# Patient Record
Sex: FEMALE | Race: WHITE | Employment: OTHER | URBAN - METROPOLITAN AREA
[De-identification: names, ages, dates, MRNs, and addresses within clinical notes are randomized per-mention and may not be internally consistent; named-entity substitution may affect disease eponyms.]

---

## 2017-01-06 DIAGNOSIS — M54.5 CHRONIC LOW BACK PAIN, UNSPECIFIED BACK PAIN LATERALITY, WITH SCIATICA PRESENCE UNSPECIFIED: Primary | ICD-10-CM

## 2017-01-06 DIAGNOSIS — G89.29 CHRONIC LOW BACK PAIN, UNSPECIFIED BACK PAIN LATERALITY, WITH SCIATICA PRESENCE UNSPECIFIED: Primary | ICD-10-CM

## 2017-01-06 NOTE — TELEPHONE ENCOUNTER
Meloxicam 15mg       Last Written Prescription Date: 12/5/16  Last Quantity: 30, # refills: 0  Last Office Visit with G, P or [facility] prescribing provider: 12/7/16       CREATININE   Date Value Ref Range Status   02/24/2016 0.97 0.52 - 1.04 mg/dL Final     AST       18   10/4/2016  ALT       34   10/4/2016  BP Readings from Last 3 Encounters:   12/28/16 118/75   12/07/16 114/80   10/10/16 115/75     Thank you!  Rachael Arzola   [facility]  P: [phone] F: [phone]

## 2017-01-06 NOTE — TELEPHONE ENCOUNTER
Routing refill request to provider for review/approval because:  Med plan.    Emelia Green, Pharm.D.  [facility]  [phone]

## 2017-01-09 RX ORDER — MELOXICAM 15 MG/1
15 TABLET ORAL DAILY
Qty: 90 TABLET | Refills: 1 | Status: SHIPPED | OUTPATIENT
Start: 2017-01-09 | End: 2017-06-30

## 2017-01-11 ENCOUNTER — MYC MEDICAL ADVICE (OUTPATIENT)
Dept: NEUROLOGY | Facility: CLINIC | Age: 57
End: 2017-01-11

## 2017-01-11 ENCOUNTER — MYC MEDICAL ADVICE (OUTPATIENT)
Dept: FAMILY MEDICINE | Facility: CLINIC | Age: 57
End: 2017-01-11

## 2017-01-20 DIAGNOSIS — F43.23 ADJUSTMENT DISORDER WITH MIXED ANXIETY AND DEPRESSED MOOD: Primary | ICD-10-CM

## 2017-01-20 RX ORDER — CLONAZEPAM 1 MG/1
1-2 TABLET ORAL
Qty: 45 TABLET | Refills: 0 | Status: SHIPPED | OUTPATIENT
Start: 2017-01-20 | End: 2017-02-21

## 2017-01-20 NOTE — TELEPHONE ENCOUNTER
Neurologist Dr Alaniz had offered to take over prescribing since this was no longer for anxiety.  Re-routing.

## 2017-01-20 NOTE — TELEPHONE ENCOUNTER
Klonopin 1mg      Last Written Prescription Date:  11/9/16  Last Fill Quantity: 45,   # refills: 1  Last Office Visit with [facility], [facility] or [facility] prescribing provider: 12/7/16  Future Office visit:       Routing refill request to provider for review/approval because:  Drug not on the [facility], [facility] or [facility] refill protocol or controlled substance    Ceasar Henderson CPhT  [facility]    On behalf of [facility]

## 2017-01-30 ENCOUNTER — RADIANT APPOINTMENT (OUTPATIENT)
Dept: GENERAL RADIOLOGY | Facility: CLINIC | Age: 57
End: 2017-01-30
Attending: PHYSICIAN ASSISTANT
Payer: COMMERCIAL

## 2017-01-30 ENCOUNTER — OFFICE VISIT (OUTPATIENT)
Dept: FAMILY MEDICINE | Facility: CLINIC | Age: 57
End: 2017-01-30
Payer: COMMERCIAL

## 2017-01-30 VITALS
BODY MASS INDEX: 32.43 KG/M2 | SYSTOLIC BLOOD PRESSURE: 123 MMHG | HEART RATE: 81 BPM | WEIGHT: 183 LBS | HEIGHT: 63 IN | TEMPERATURE: 98.2 F | DIASTOLIC BLOOD PRESSURE: 75 MMHG

## 2017-01-30 DIAGNOSIS — S99.912A LEFT ANKLE INJURY, INITIAL ENCOUNTER: ICD-10-CM

## 2017-01-30 DIAGNOSIS — S99.912A LEFT ANKLE INJURY, INITIAL ENCOUNTER: Primary | ICD-10-CM

## 2017-01-30 PROCEDURE — 99213 OFFICE O/P EST LOW 20 MIN: CPT | Performed by: PHYSICIAN ASSISTANT

## 2017-01-30 PROCEDURE — 73610 X-RAY EXAM OF ANKLE: CPT | Mod: LT

## 2017-01-30 NOTE — NURSING NOTE
"Chief Complaint   Patient presents with     Musculoskeletal Problem       Initial /75 mmHg  Pulse 81  Temp(Src) 98.2  F (36.8  C) (Tympanic)  Ht 5' 3\" (1.6 m)  Wt 183 lb (83.008 kg)  BMI 32.43 kg/m2  LMP 02/24/1993 Estimated body mass index is 32.43 kg/(m^2) as calculated from the following:    Height as of this encounter: 5' 3\" (1.6 m).    Weight as of this encounter: 183 lb (83.008 kg).  BP completed using cuff size: elan REID MA    "

## 2017-01-30 NOTE — PROGRESS NOTES
"  SUBJECTIVE:                                                    Patricia Smith is a 57 year old female who presents to clinic today for the following health issues:    Musculoskeletal problem/pain    Duration: 12/7/2016    Description  Location: Left ankle    Intensity:  moderate    Accompanying signs and symptoms: swelling and Pain, discoloration.  Feels it swells on top of the foot, after awhile it turns black/blue.  Toes often feel cold    History  Previous similar problem: no   Previous evaluation:  none    Precipitating or alleviating factors:  Trauma or overuse: YES- fall from step ladder  Aggravating factors include: standing and walking    Therapies tried and outcome: support wrap   Was standing sideways on a ladder and other foot slid down so that her affected L leg was stuck up on ladder.  Immediate pain and swelling.  Pain continues and swelling still occurs - near L malleolus and over tarsal area.  Continues to bruise as well.      Problem list and histories reviewed & adjusted, as indicated.  Additional history: as documented    Problem list, Medication list, Allergies, and Medical/Social/Surgical histories reviewed in EPIC and updated as appropriate.    ROS:  Constitutional, musculoskeletal systems are negative, except as otherwise noted.    OBJECTIVE:                                                    /75 mmHg  Pulse 81  Temp(Src) 98.2  F (36.8  C) (Tympanic)  Ht 5' 3\" (1.6 m)  Wt 183 lb (83.008 kg)  BMI 32.43 kg/m2  LMP 02/24/1993  Body mass index is 32.43 kg/(m^2).  GENERAL: healthy, alert and no distress  MS: Ankle: L ankle has mild swelling around lateral malleolus and around ATF.  It is most tender over 3rd and 4th tarsals.  Remainder of foot is not tender to palpation.  There is no discoloration.  Normal warmth.  Toes mildly swollen.  Anterior and posterior draws with normal stability and without pain.  Has a lot of pain with twisting midfoot.      Xray read by Tresa Best, " RANDELL as no fracture or lizfranc     ASSESSMENT/PLAN:                                                        ICD-10-CM    1. Left ankle injury, initial encounter S99.912A XR Ankle Left G/E 3 Views     PODIATRY/FOOT & ANKLE SURGERY REFERRAL     Patient Instructions   No break seen  Since this has been for 6+ weeks, still see foot doctor      Tresa Best PA-C  [facility]

## 2017-01-30 NOTE — MR AVS SNAPSHOT
After Visit Summary   1/30/2017    Patricia Smith    MRN: [id number]           Patient Information     Date Of Birth          1960        Visit Information        Provider Department      1/30/2017 3:20 PM Tresa Best PA-C [facility]        Today's Diagnoses     Left ankle injury, initial encounter    -  1       Care Instructions    No break seen  Since this has been for 6+ weeks, still see foot doctor        Follow-ups after your visit        Additional Services     PODIATRY/FOOT & ANKLE SURGERY REFERRAL       Your provider has referred you to: [facility]: [facility] [phone]   [email or web address]    Please be aware that coverage of these services is subject to the terms and limitations of your health insurance plan.  Call member services at your health plan with any benefit or coverage questions.      Please bring the following to your appointment:  >>   Any x-rays, CTs or MRIs which have been performed.  Contact the facility where they were done to arrange for  prior to your scheduled appointment.    >>   List of current medications   >>   This referral request   >>   Any documents/labs given to you for this referral                  Your next 10 appointments already scheduled     Jun 28, 2017 10:15 AM   [facility]iz [facility]   [facility]    [address] [address] [phone]              Who to contact     If you have questions or need follow up information about today's clinic visit or your schedule please contact [facility] directly at [phone].  Normal or non-critical lab and imaging results will be communicated to you by MyChart, letter or phone within 4 business days after the clinic has received the results. If you do not hear from us within 7 days, please contact the clinic through [facility] or  "phone. If you have a critical or abnormal lab result, we will notify you by phone as soon as possible.  Submit refill requests through [employer] or call your pharmacy and they will forward the refill request to us. Please allow 3 business days for your refill to be completed.          Additional Information About Your Visit        [employer] System[employer] Information     [employer] gives you secure access to your electronic health record. If you see a primary care provider, you can also send messages to your care team and make appointments. If you have questions, please call your primary care clinic.  If you do not have a primary care provider, please call [phone] and they will assist you.        Care EveryWhere ID     This is your Care EveryWhere ID. This could be used by other organizations to access your [facility] medical records  [id number]        Your Vitals Were     Pulse Temperature Height BMI (Body Mass Index) Last Period       81 98.2  F (36.8  C) (Tympanic) 5' 3\" (1.6 m) 32.43 kg/m2 02/24/1993        Blood Pressure from Last 3 Encounters:   01/30/17 123/75   12/28/16 118/75   12/07/16 114/80    Weight from Last 3 Encounters:   01/30/17 183 lb (83.008 kg)   12/28/16 185 lb 9.6 oz (84.188 kg)   10/10/16 181 lb 9.6 oz (82.373 kg)              We Performed the Following     PODIATRY/FOOT & ANKLE SURGERY REFERRAL          Today's Medication Changes          These changes are accurate as of: 1/30/17  3:58 PM.  If you have any questions, ask your nurse or doctor.               These medicines have changed or have updated prescriptions.        Dose/Directions    fluticasone-salmeterol 45-21 MCG/ACT inhaler   Commonly known as:  ADVAIR-HFA   This may have changed:    - when to take this  - reasons to take this   Used for:  Mild intermittent asthma without complication        Dose:  2 puff   Inhale 2 puffs into the lungs 2 times daily   Quantity:  12 g   Refills:  1                Primary Care Provider Office Phone # Fax #    " Tresa Best PA-C [phone]       [facility] [address] [address]        Thank you!     Thank you for choosing [facility]  for your care. Our goal is always to provide you with excellent care. Hearing back from our patients is one way we can continue to improve our services. Please take a few minutes to complete the written survey that you may receive in the mail after your visit with us. Thank you!             Your Updated Medication List - Protect others around you: Learn how to safely use, store and throw away your medicines at www.disposemymeds.org.          This list is accurate as of: 1/30/17  3:58 PM.  Always use your most recent med list.                   Brand Name Dispense Instructions for use    albuterol 108 (90 BASE) MCG/ACT Inhaler    PROAIR HFA/PROVENTIL HFA/VENTOLIN HFA    1 Inhaler    Inhale 2 puffs into the lungs every 6 hours       baclofen 20 MG tablet    LIORESAL    60 tablet    Take 1 tablet (20 mg) by mouth 2 times daily       BOTOX IJ          clonazePAM 1 MG tablet    klonoPIN    45 tablet    Take 1-2 tablets (1-2 mg) by mouth nightly as needed for anxiety Trying to reduce amount of clonazepam and see if we can get you off it.  Currently taking 1.5 mg per day.  Refill not more than monthly.       cyanocobalamin 1000 MCG tablet    vitamin  B-12     Take 1,000 mcg by mouth daily       esomeprazole 40 MG CR capsule    nexIUM    90 capsule    Take 1 capsule (40 mg) by mouth every morning (before breakfast) Take 30-60 minutes before eating.       fexofenadine-pseudoePHEDrine  MG per 12 hr tablet    ALLEGRA-D    60 tablet    Take 1 tablet by mouth 2 times daily       fluticasone-salmeterol 45-21 MCG/ACT inhaler    ADVAIR-HFA    12 g    Inhale 2 puffs into the lungs 2 times daily       furosemide 40 MG tablet    LASIX    90 tablet    Take 1 tablet (40 mg) by mouth daily       gabapentin 300 MG capsule    NEURONTIN    180  capsule    3 caps morning and 3 caps evening.       meloxicam 15 MG tablet    MOBIC    90 tablet    Take 1 tablet (15 mg) by mouth daily       MULTI FOR HER 50+ Caps          order for DME     1 Device    Equipment being ordered: ankle, air, kingp, universal       sertraline 100 MG tablet    ZOLOFT    45 tablet    Take 1.5 tablets (150 mg) by mouth daily       Vitamin D-3 1000 UNITS Caps      Take 5,000 Units by mouth daily

## 2017-02-01 ENCOUNTER — OFFICE VISIT (OUTPATIENT)
Dept: PODIATRY | Facility: CLINIC | Age: 57
End: 2017-02-01
Payer: MEDICARE

## 2017-02-01 VITALS — WEIGHT: 183 LBS | HEIGHT: 63 IN | BODY MASS INDEX: 32.43 KG/M2

## 2017-02-01 DIAGNOSIS — S93.402D INVERSION SPRAIN OF LEFT ANKLE, SUBSEQUENT ENCOUNTER: ICD-10-CM

## 2017-02-01 DIAGNOSIS — M25.572 ACUTE LEFT ANKLE PAIN: Primary | ICD-10-CM

## 2017-02-01 PROCEDURE — 99203 OFFICE O/P NEW LOW 30 MIN: CPT | Performed by: PODIATRIST

## 2017-02-04 ENCOUNTER — MYC MEDICAL ADVICE (OUTPATIENT)
Dept: FAMILY MEDICINE | Facility: CLINIC | Age: 57
End: 2017-02-04

## 2017-02-09 NOTE — PROGRESS NOTES
PATIENT HISTORY:  Patricia Smith is a 57 year old female who presents to clinic for a painful left ankle.  The patient describes the pain as sharp stabbing.  The patient relates pain is located over the outside of the left ankle.  The patient relates the pain has been present for the past several weeks.  The patient relates pain with ambulation.  The patient has tried ice with little relief.       REVIEW OF SYSTEMS:  Constitutional, HEENT, cardiovascular, pulmonary, GI, , musculoskeletal, neuro, skin, endocrine and psych systems are negative, except as otherwise noted.     PAST MEDICAL HISTORY:   Past Medical History   Diagnosis Date     Depression      Asthma      GERD (gastroesophageal reflux disease)      Anxiety      DJD (degenerative joint disease)      Tinea corporis      Constipation      Allergic rhinitis      Degeneration of cervical intervertebral disc      Hypothyroidism      Degeneration of lumbosacral intervertebral disc      Degenerative disc disease      Hemorrhoids      Pelvic fracture (H)      Depressive disorder      History of blood transfusion      multiple for menorrhagia, last transfusion .  pt reports has had HIV and hep B & C testing        PAST SURGICAL HISTORY:   Past Surgical History   Procedure Laterality Date     Appendectomy       Ent surgery       Back surgery  , , 2013     Laminect/discectomy, lumbar  , 2012     Laminectomy/Discectomy Cervical     C/section, classical  x3     , Classical     Tonsillectomy & adenoidectomy  1963     Neck surgery  x3     Hysterectomy, korina       Surgical history of -        bowel obstruction w/adhesions     Surgical history of -  Left      bone spur shoulder     Hemorrhoidectomy  2015     Orthopedic surgery       Abdomen surgery       See attached Sheet     Head & neck surgery  x3        MEDICATIONS:   Current outpatient prescriptions:      clonazePAM (KLONOPIN) 1 MG tablet, Take 1-2 tablets (1-2 mg) by mouth  nightly as needed for anxiety Trying to reduce amount of clonazepam and see if we can get you off it.  Currently taking 1.5 mg per day.  Refill not more than monthly., Disp: 45 tablet, Rfl: 0     meloxicam (MOBIC) 15 MG tablet, Take 1 tablet (15 mg) by mouth daily, Disp: 90 tablet, Rfl: 1     baclofen (LIORESAL) 20 MG tablet, Take 1 tablet (20 mg) by mouth 2 times daily, Disp: 60 tablet, Rfl: 1     order for DME, Equipment being ordered: ankle, air, stirrup, universal, Disp: 1 Device, Rfl: 0     gabapentin (NEURONTIN) 300 MG capsule, 3 caps morning and 3 caps evening., Disp: 180 capsule, Rfl: 5     fexofenadine-pseudoePHEDrine (ALLEGRA-D)  MG per tablet, Take 1 tablet by mouth 2 times daily, Disp: 60 tablet, Rfl: 11     sertraline (ZOLOFT) 100 MG tablet, Take 1.5 tablets (150 mg) by mouth daily, Disp: 45 tablet, Rfl: 5     fluticasone-salmeterol (ADVAIR-HFA) 45-21 MCG/ACT inhaler, Inhale 2 puffs into the lungs 2 times daily (Patient taking differently: Inhale 2 puffs into the lungs 2 times daily as needed ), Disp: 12 g, Rfl: 1     albuterol (PROAIR HFA, PROVENTIL HFA, VENTOLIN HFA) 108 (90 BASE) MCG/ACT inhaler, Inhale 2 puffs into the lungs every 6 hours, Disp: 1 Inhaler, Rfl: 3     Multiple Vitamins-Minerals (MULTI FOR HER 50+) CAPS, , Disp: , Rfl:      esomeprazole (NEXIUM) 40 MG capsule, Take 1 capsule (40 mg) by mouth every morning (before breakfast) Take 30-60 minutes before eating., Disp: 90 capsule, Rfl: 3     furosemide (LASIX) 40 MG tablet, Take 1 tablet (40 mg) by mouth daily, Disp: 90 tablet, Rfl: 3     Cholecalciferol (VITAMIN D-3) 1000 UNITS CAPS, Take 5,000 Units by mouth daily , Disp: , Rfl:      cyanocobalamin (VITAMIN  B-12) 1000 MCG tablet, Take 1,000 mcg by mouth daily, Disp: , Rfl:      OnabotulinumtoxinA (BOTOX IJ), , Disp: , Rfl:      ALLERGIES:    Allergies   Allergen Reactions     Bactrim [Sulfamethoxazole W/Trimethoprim] Hives     Codeine Hives     Erythromycin GI Disturbance      "Hydrocodone Hives     Lortab [Hydrocodone-Acetaminophen] Hives     Omeprazole      Dizzy       Penicillins Hives        SOCIAL HISTORY:   Social History     Social History     Marital Status:      Spouse Name: N/A     Number of Children: N/A     Years of Education: N/A     Occupational History     Not on file.     Social History Main Topics     Smoking status: Former Smoker -- 1.00 packs/day for 15 years     Types: Cigarettes     Smokeless tobacco: Never Used      Comment: started at age 19.  Smoked about 15 years     Alcohol Use: No     Drug Use: No     Sexual Activity:     Partners: Male     Birth Control/ Protection: Abstinence, Female Surgical     Other Topics Concern     Parent/Sibling W/ Cabg, Mi Or Angioplasty Before 65f 55m? Yes     I think my Brother Nicholas Kwan     Social History Narrative        FAMILY HISTORY:   Family History   Problem Relation Age of Onset     Coronary Artery Disease Brother      in hs 50s     Breast Cancer No family hx of      Colon Cancer No family hx of      DIABETES Mother      Coronary Artery Disease Brother      Hypertension Father      Hyperlipidemia Father      Other Cancer Sister      Ovarian     Depression Mother      Depression Sister      Anxiety Disorder Mother      Anxiety Disorder Nephew      Anxiety Disorder Nephew      Asthma Mother      Asthma Sister      Thyroid Disease Sister      Coronary Artery Disease Brother      Other Cancer Sister      Ovarian        EXAM:Vitals: Ht 1.6 m (5' 3\")  Wt 83.008 kg (183 lb)  BMI 32.43 kg/m2  LMP 02/24/1993  BMI= Body mass index is 32.43 kg/(m^2).    Weight management plan: Patient was referred to their PCP to discuss a diet and exercise plan.    General appearance: Patient is alert and fully cooperative with history & exam.  No sign of distress is noted during the visit.     Psychiatric: Affect is pleasant & appropriate.  Patient appears motivated to improve health.     Respiratory: Breathing is regular & unlabored while " sitting.     HEENT: Hearing is intact to spoken word.  Speech is clear.  No gross evidence of visual impairment that would impact ambulation.     Dermatologic: Skin is intact to both lower extremities without significant lesions, rash or abrasion.  No paronychia or evidence of soft tissue infection is noted.     Vascular: DP & PT pulses are intact & regular bilaterally.  No significant edema or varicosities noted.  CFT and skin temperature is normal to both lower extremities.     Neurologic: Lower extremity sensation is intact to light touch.  No evidence of weakness or contracture in the lower extremities.  No evidence of neuropathy.     Musculoskeletal: Patient is ambulatory without assistive device or brace.  No gross ankle deformity noted.  No foot or ankle joint effusion is noted.  Noted pain on palpation over the Anterior Talofibular ligament on the left.  Noted edema over the sinus tarsi on the left.  Pain with inversion of the ankle joint on the left.  No ecchymosis noted.    Radiographs were evaluated including AP, lateral and mortise views of the left ankle reveals  no cortical erosions or periosteal elevation.  All joint margins appear stable.  There is no apparent fracture or tumor formation noted.  There is no evidence of foreign body.    ASSESSMENT / PLAN:     ICD-10-CM    1. Acute left ankle pain M25.572    2. Inversion sprain of left ankle, subsequent encounter S93.402D        I have explained to Patricia  about the conditions.  We discussed the nature of the condition as well as the treatment plan and expected length of recovery.  At this time, the patient was instructed on icing, stretching, tissue massage and support.  The patient will return in four weeks for reevaluation if the symptoms do not resolve.          Disclaimer: This note consists of symbols derived from keyboarding, dictation and/or voice recognition software. As a result, there may be errors in the script that have gone undetected.  Please consider this when interpreting information found in this chart.       MILLER An D.P.M., FLORRIE.F.A.S.      '

## 2017-02-13 ENCOUNTER — MYC REFILL (OUTPATIENT)
Dept: FAMILY MEDICINE | Facility: CLINIC | Age: 57
End: 2017-02-13

## 2017-02-13 DIAGNOSIS — G89.29 CHRONIC LOW BACK PAIN, UNSPECIFIED BACK PAIN LATERALITY, WITH SCIATICA PRESENCE UNSPECIFIED: ICD-10-CM

## 2017-02-13 DIAGNOSIS — M54.5 CHRONIC LOW BACK PAIN, UNSPECIFIED BACK PAIN LATERALITY, WITH SCIATICA PRESENCE UNSPECIFIED: ICD-10-CM

## 2017-02-14 NOTE — TELEPHONE ENCOUNTER
Routing refill request to provider for review/approval because:  Drug not on the [facility] refill protocol     Thank you  Janiya TRUONG RN

## 2017-02-16 RX ORDER — BACLOFEN 20 MG/1
20 TABLET ORAL 2 TIMES DAILY
Qty: 60 TABLET | Refills: 3 | Status: SHIPPED | OUTPATIENT
Start: 2017-02-16 | End: 2017-03-13

## 2017-02-20 ENCOUNTER — MYC REFILL (OUTPATIENT)
Dept: NEUROLOGY | Facility: CLINIC | Age: 57
End: 2017-02-20

## 2017-02-20 DIAGNOSIS — F43.23 ADJUSTMENT DISORDER WITH MIXED ANXIETY AND DEPRESSED MOOD: ICD-10-CM

## 2017-02-20 RX ORDER — CLONAZEPAM 1 MG/1
1-2 TABLET ORAL
Qty: 45 TABLET | Refills: 0 | Status: CANCELLED | OUTPATIENT
Start: 2017-02-20

## 2017-02-21 DIAGNOSIS — F43.23 ADJUSTMENT DISORDER WITH MIXED ANXIETY AND DEPRESSED MOOD: ICD-10-CM

## 2017-02-21 NOTE — TELEPHONE ENCOUNTER
Clonazepam 1mg      Last Written Prescription Date:  1/20/17  Last Fill Quantity: 45,   # refills: 0  Last Office Visit with G, [facility] or [facility] prescribing provider: 1/30/17  Future Office visit:       Routing refill request to provider for review/approval because:  Drug not on the [facility], [facility] or [facility] refill protocol or controlled substance      Thank You!  Lizette Olivares  [facility]  P: [phone] F:[phone]

## 2017-02-22 RX ORDER — CLONAZEPAM 1 MG/1
1-2 TABLET ORAL
Qty: 45 TABLET | Refills: 0 | Status: SHIPPED | OUTPATIENT
Start: 2017-02-22 | End: 2017-03-13

## 2017-02-22 NOTE — TELEPHONE ENCOUNTER
I would like Patricia to follow-up with me regarding continuation of the medication. I would like to have her wean off of the benzodiazepine and try an alternative. We should discuss this in clinic.  CY

## 2017-02-22 NOTE — TELEPHONE ENCOUNTER
Pt notified via mychart to wean off the medication and discuss at follow up appt.  Isabel HALL RN BSN PHN  [facility]

## 2017-03-02 ENCOUNTER — MYC MEDICAL ADVICE (OUTPATIENT)
Dept: FAMILY MEDICINE | Facility: CLINIC | Age: 57
End: 2017-03-02

## 2017-03-02 DIAGNOSIS — K21.9 GASTROESOPHAGEAL REFLUX DISEASE WITHOUT ESOPHAGITIS: ICD-10-CM

## 2017-03-02 RX ORDER — ESOMEPRAZOLE MAGNESIUM 40 MG/1
40 CAPSULE, DELAYED RELEASE ORAL
Qty: 90 CAPSULE | Refills: 1 | Status: SHIPPED | OUTPATIENT
Start: 2017-03-02 | End: 2017-03-13

## 2017-03-02 NOTE — TELEPHONE ENCOUNTER
Nexium      Last Written Prescription Date: 2-24-16  Last Fill Quantity: 90,  # refills: 3   Last Office Visit with G, P or [facility] prescribing provider: 1-30-17      [facility]  [facility]  On Behalf of [facility]

## 2017-03-02 NOTE — TELEPHONE ENCOUNTER
Prescription approved per [facility] Refill Protocol.    Emelia Green, Pharm.D.  [facility]  [phone]

## 2017-03-13 ENCOUNTER — OFFICE VISIT (OUTPATIENT)
Dept: NEUROLOGY | Facility: CLINIC | Age: 57
End: 2017-03-13
Payer: MEDICARE

## 2017-03-13 VITALS — DIASTOLIC BLOOD PRESSURE: 59 MMHG | SYSTOLIC BLOOD PRESSURE: 103 MMHG | TEMPERATURE: 98 F | HEART RATE: 83 BPM

## 2017-03-13 DIAGNOSIS — G89.29 CHRONIC LOW BACK PAIN, UNSPECIFIED BACK PAIN LATERALITY, WITH SCIATICA PRESENCE UNSPECIFIED: ICD-10-CM

## 2017-03-13 DIAGNOSIS — M54.2 CERVICALGIA: ICD-10-CM

## 2017-03-13 DIAGNOSIS — M54.5 CHRONIC LOW BACK PAIN, UNSPECIFIED BACK PAIN LATERALITY, WITH SCIATICA PRESENCE UNSPECIFIED: ICD-10-CM

## 2017-03-13 DIAGNOSIS — R25.3 MUSCLE TWITCHING: ICD-10-CM

## 2017-03-13 DIAGNOSIS — Z98.890 HISTORY OF SPINAL SURGERY: ICD-10-CM

## 2017-03-13 DIAGNOSIS — R20.2 PARESTHESIAS: Primary | ICD-10-CM

## 2017-03-13 PROCEDURE — 99215 OFFICE O/P EST HI 40 MIN: CPT | Performed by: PSYCHIATRY & NEUROLOGY

## 2017-03-13 RX ORDER — BACLOFEN 20 MG/1
20 TABLET ORAL 2 TIMES DAILY
Qty: 60 TABLET | Refills: 3 | Status: SHIPPED | OUTPATIENT
Start: 2017-03-13 | End: 2017-06-28

## 2017-03-13 RX ORDER — CLONAZEPAM 1 MG/1
1-2 TABLET ORAL
Qty: 45 TABLET | Refills: 1 | Status: SHIPPED | OUTPATIENT
Start: 2017-03-13 | End: 2017-05-20

## 2017-03-13 RX ORDER — GABAPENTIN 300 MG/1
CAPSULE ORAL
Qty: 180 CAPSULE | Refills: 5 | Status: SHIPPED | OUTPATIENT
Start: 2017-03-13 | End: 2017-09-14

## 2017-03-13 ASSESSMENT — ANXIETY QUESTIONNAIRES
GAD7 TOTAL SCORE: 7
3. WORRYING TOO MUCH ABOUT DIFFERENT THINGS: SEVERAL DAYS
5. BEING SO RESTLESS THAT IT IS HARD TO SIT STILL: SEVERAL DAYS
2. NOT BEING ABLE TO STOP OR CONTROL WORRYING: SEVERAL DAYS
1. FEELING NERVOUS, ANXIOUS, OR ON EDGE: SEVERAL DAYS
7. FEELING AFRAID AS IF SOMETHING AWFUL MIGHT HAPPEN: SEVERAL DAYS
6. BECOMING EASILY ANNOYED OR IRRITABLE: SEVERAL DAYS

## 2017-03-13 ASSESSMENT — PAIN SCALES - GENERAL: PAINLEVEL: MILD PAIN (3)

## 2017-03-13 ASSESSMENT — PATIENT HEALTH QUESTIONNAIRE - PHQ9: 5. POOR APPETITE OR OVEREATING: SEVERAL DAYS

## 2017-03-13 NOTE — PROGRESS NOTES
ESTABLISHED PATIENT NEUROLOGY NOTE    DATE OF VISIT: 3/13/2017  MRN: [id number]  PATIENT NAME: Patricia Smith  YOB: 1960    Chief Complaint   Patient presents with     RECHECK     Radiculopathy, tingling of upper extremeties.     SUBJECTIVE:                                                      HISTORY OF PRESENT ILLNESS:  Patricia is here for follow up regarding chronic back pain and paresthesias in the arms and legs. The patient called since our last visit, reporting new tingling in the Right arm, whereas she said the Left arm was previously more affected. She says that her neck has been sore and the legs also feel stiff and sore. She noticed some increased tingling in the feet as well, when she started to wear a compression stocking on the Left after injuring the foot. She continues to wear a brace on this foot. She admits that she has not been exercising regularly anymore due to her previous program no longer being covered by her new insurance. She notes tightness around the hips. She feels that the legs are generally weaker. No weakness in the arms. She feels that the baclofen does help some with the neck tightness and she also notes continued relief of muscle twitching from the clonazepam. Gabapentin is also helpful. She denies side effects from the medications. She denies bladder/bowel changes. She recently had a cataract removed. She reports good mood. She has not been back to pain clinic yet. She does not have a spinal specialist here as of yet.     Please see my previous notes for additional historical information.     CURRENT MEDICATIONS:     Current Outpatient Prescriptions on File Prior to Visit:  clonazePAM (KLONOPIN) 1 MG tablet Take 1-2 tablets (1-2 mg) by mouth nightly as needed for anxiety Trying to reduce amount of clonazepam and see if we can get you off it.  Currently taking 1.5 mg per day.  Refill not more than monthly. (Patient taking differently: 1.5 mg At Bedtime Trying to reduce  amount of clonazepam and see if we can get you off it.  Currently taking 1.5 mg per day.  Refill not more than monthly.)   baclofen (LIORESAL) 20 MG tablet Take 1 tablet (20 mg) by mouth 2 times daily   meloxicam (MOBIC) 15 MG tablet Take 1 tablet (15 mg) by mouth daily   gabapentin (NEURONTIN) 300 MG capsule 3 caps morning and 3 caps evening.   fexofenadine-pseudoePHEDrine (ALLEGRA-D)  MG per tablet Take 1 tablet by mouth 2 times daily   sertraline (ZOLOFT) 100 MG tablet Take 1.5 tablets (150 mg) by mouth daily (Patient taking differently: Take 100 mg by mouth daily )   fluticasone-salmeterol (ADVAIR-HFA) 45-21 MCG/ACT inhaler Inhale 2 puffs into the lungs 2 times daily (Patient taking differently: Inhale 2 puffs into the lungs 2 times daily as needed )   albuterol (PROAIR HFA, PROVENTIL HFA, VENTOLIN HFA) 108 (90 BASE) MCG/ACT inhaler Inhale 2 puffs into the lungs every 6 hours   Multiple Vitamins-Minerals (MULTI FOR HER 50+) CAPS    furosemide (LASIX) 40 MG tablet Take 1 tablet (40 mg) by mouth daily   Cholecalciferol (VITAMIN D-3) 1000 UNITS CAPS Take 5,000 Units by mouth daily    cyanocobalamin (VITAMIN  B-12) 1000 MCG tablet Take 1,000 mcg by mouth daily   order for DME Equipment being ordered: ankle, air, stirrup, universal   OnabotulinumtoxinA (BOTOX IJ) Reported on 3/13/2017     No current facility-administered medications on file prior to visit.     RECENT DIAGNOSTIC STUDIES:   Imaging:  MRI Cervical Spine (5.14.16):  IMPRESSION:   1. Previous anterior fusions at C4-5, C5-6, and C6-7.  2. Mild central spinal stenosis is still present at the C4-5, C5-6 and  C6-7 due to disc bulges and posterior osteophytes at these levels.  3. There is still mild foraminal stenosis present at C4-5 and moderate  foraminal stenosis present at C6-7 due to uncinate spurs.   REVIEW OF SYSTEMS:                                                      10-point review of systems is negative except as mentioned above in HPI.      EXAM:                                                      Physical Exam:   Vitals: /59 (BP Location: Right arm, Patient Position: Chair, Cuff Size: Adult Large)  Pulse 83  Temp 98  F (36.7  C) (Oral)  LMP 02/24/1993  BMI= There is no height or weight on file to calculate BMI.  GENERAL: NAD.  HEENT: Some soreness of posterior neck. Supple. Normal ROM.  Neurologic:  MENTAL STATUS: Alert, attentive. Speech is fluent. Normal comprehension. Normal concentration. Adequate fund of knowledge.  PHQ-9: 6, LOIDA-7: 7.  CRANIAL NERVES: PERRL. Facial movement normal. EOM full. Hearing intact to conversation. Trapezius strength intact.   MOTOR: 4+/5 with Left dorsiflexion and inversion/eversion, Otherwise 5/5 in proximal and distal muscle groups of upper and lower extremities. Tone and bulk normal.    DTRs: Intact and symmetric in UEs. Brisk with spread at patellae.  Ankle jerks present.  SENSATION: Normal light touch and pinprick on the Right. Patchy decrease in sensation to light touch and pinprick in LLE. Intact proprioception. Vibration: Slightly decreased at Left ankle (with brace inhibiting testing).    COORDINATION: Normal finger nose finger. Finger tapping slightly slow, deliberate. Knee heel shin normal.  STATION AND GAIT: Romberg negative. Gait is slightly wide, antalgic.       ASSESSMENT and PLAN:                                                      Assessment and Plan:    ICD-10-CM    1. Chronic low back pain, unspecified back pain laterality, with sciatica presence unspecified M54.5 gabapentin (NEURONTIN) 300 MG capsule    G89.29 baclofen (LIORESAL) 20 MG tablet     clonazePAM (KLONOPIN) 1 MG tablet   2. Adjustment disorder with mixed anxiety and depressed mood F43.23 gabapentin (NEURONTIN) 300 MG capsule     baclofen (LIORESAL) 20 MG tablet     clonazePAM (KLONOPIN) 1 MG tablet     Ms. Smith is a pleasant 56 yo woman with chronic neck and back pain with paresthesias, likely radiculopathy. It seems that her  complaints are all linked to spinal degeneration and multiple spinal surgeries.  Given the new/changed arm involvement, I suggested we re-image the cervical spine. At this point I think she would benefit from establishing care with a spinal specialist as I have simply been providing symptomatic treatments, and I think she may be better served long-term either through surgery or additional injections which I am unable to provide. For now, we will refill her prescriptions without dose changes. I also encouraged the patient to get back into an exercise program as this really provided benefit for her in the past. I do recommend she discuss this with the spinal specialist for further direction/safety. If deemed appropriate, I suggested she return to pain clinic as well. The patient understands and agrees with the plan.     Patient Instructions:  Repeat Cervical Spine MRI.  Referral to Orthopedics/Spine specialist. If they approve, I recommend you start physical therapy/exercise program.   Return to clinic in 3-4 months (after the above).     Total Time: 40 minutes were spent with the patient. More than 50% of the time spent on counseling (as described above in Assessment and Plan) /coordinating the care.    Leonor Alaniz MD  Neurology

## 2017-03-13 NOTE — PATIENT INSTRUCTIONS
Plan:    Repeat Cervical Spine MRI.  Referral to Orthopedics/Spine specialist. If they approve, I recommend you start physical therapy/exercise program.   Return to clinic in 3-4 months (after the above).

## 2017-03-13 NOTE — MR AVS SNAPSHOT
After Visit Summary   3/13/2017    Patricia Smith    MRN: [id number]           Patient Information     Date Of Birth          1960        Visit Information        Provider Department      3/13/2017 8:00 AM Leonor Alaniz MD [facility]        Today's Diagnoses     Paresthesias    -  1    Chronic low back pain, unspecified back pain laterality, with sciatica presence unspecified        Muscle twitching        Cervicalgia        History of spinal surgery          Care Instructions    Plan:    Repeat Cervical Spine MRI.  Referral to Orthopedics/Spine specialist. If they approve, I recommend you start physical therapy/exercise program.   Return to clinic in 3-4 months (after the above).         Follow-ups after your visit        Additional Services     ORTHO  REFERRAL       [facility] is referring you to the Orthopedic  Services at [facility].       The  Representative will assist you in the coordination of your Orthopedic and Musculoskeletal Care as prescribed by your physician.    The  Representative will call you within 1 business day to help schedule your appointment, or you may contact the  Representative at:    All areas ~ [phone]     Type of Referral : Spine: Cervical / Thoracic: Medical Spine Specialist        Timeframe requested: Routine    Coverage of these services is subject to the terms and limitations of your health insurance plan.  Please call member services at your health plan with any benefit or coverage questions.      If X-rays, CT or MRI's have been performed, please contact the facility where they were done to arrange for , prior to your scheduled appointment.  Please bring this referral request to your appointment and present it to your specialist.                  Your next 10 appointments already scheduled     Mar 13, 2017  1:20 PM ANUJT   MyChart Long with Tresa HANSON  RANDELL Best   [facility]    [address] [place] [id number] [phone]            Jun 28, 2017 10:15 AM CDT   Return Visit with Leonor Alaniz MD   [facility] ([facility]    [address]  [facility] [address]   [phone]              Future tests that were ordered for you today     Open Future Orders        Priority Expected Expires Ordered    MR Cervical Spine w/o Contrast Routine  3/13/2018 3/13/2017            Who to contact     If you have questions or need follow up information about today's clinic visit or your schedule please contact [facility] directly at [phone].  Normal or non-critical lab and imaging results will be communicated to you by MyChart, letter or phone within 4 business days after the clinic has received the results. If you do not hear from us within 7 days, please contact the clinic through MyChart or phone. If you have a critical or abnormal lab result, we will notify you by phone as soon as possible.  Submit refill requests through [employer] or call your pharmacy and they will forward the refill request to us. Please allow 3 business days for your refill to be completed.          Additional Information About Your Visit        Combined [employer] Information     [employer] gives you secure access to your electronic health record. If you see a primary care provider, you can also send messages to your care team and make appointments. If you have questions, please call your primary care clinic.  If you do not have a primary care provider, please call [phone] and they will assist you.        Care EveryWhere ID     This is your Care EveryWhere ID. This could be used by other organizations to access your [facility] medical records  [id number]        Your Vitals Were     Pulse Temperature Last Period             83 98  F (36.7  C) (Oral) 02/24/1993          Blood Pressure  from Last 3 Encounters:   03/13/17 103/59   01/30/17 123/75   12/28/16 118/75    Weight from Last 3 Encounters:   02/01/17 183 lb (83 kg)   01/30/17 183 lb (83 kg)   12/28/16 185 lb 9.6 oz (84.2 kg)              We Performed the Following     ORTHO  REFERRAL          Today's Medication Changes          These changes are accurate as of: 3/13/17  8:34 AM.  If you have any questions, ask your nurse or doctor.               These medicines have changed or have updated prescriptions.        Dose/Directions    clonazePAM 1 MG tablet   Commonly known as:  klonoPIN   This may have changed:    - how much to take  - how to take this  - when to take this  - reasons to take this  - additional instructions   Used for:  Chronic low back pain, unspecified back pain laterality, with sciatica presence unspecified, Muscle twitching        Dose:  1-2 mg   Take 1-2 tablets (1-2 mg) by mouth nightly as needed for anxiety Trying to reduce amount of clonazepam and see if we can get you off it.  Currently taking 1.5 mg per day.  Refill not more than monthly.   Quantity:  45 tablet   Refills:  1       fluticasone-salmeterol 45-21 MCG/ACT inhaler   Commonly known as:  ADVAIR-HFA   This may have changed:    - when to take this  - reasons to take this   Used for:  Mild intermittent asthma without complication        Dose:  2 puff   Inhale 2 puffs into the lungs 2 times daily   Quantity:  12 g   Refills:  1       sertraline 100 MG tablet   Commonly known as:  ZOLOFT   This may have changed:  how much to take   Used for:  Depression with anxiety        Dose:  150 mg   Take 1.5 tablets (150 mg) by mouth daily   Quantity:  45 tablet   Refills:  5         Stop taking these medicines if you haven't already. Please contact your care team if you have questions.     esomeprazole 40 MG CR capsule   Commonly known as:  nexIUM   Stopped by:  Leonor Alaniz MD                Where to get your medicines      These medications were sent to  [address]  [address] [place] [id number]     Phone:  [phone]     baclofen 20 MG tablet    gabapentin 300 MG capsule         Some of these will need a paper prescription and others can be bought over the counter.  Ask your nurse if you have questions.     Bring a paper prescription for each of these medications     clonazePAM 1 MG tablet                Primary Care Provider Office Phone # Fax #    Tresa Best PA-C [phone]       [address] [address]        Thank you!     Thank you for choosing [facility]  for your care. Our goal is always to provide you with excellent care. Hearing back from our patients is one way we can continue to improve our services. Please take a few minutes to complete the written survey that you may receive in the mail after your visit with us. Thank you!             Your Updated Medication List - Protect others around you: Learn how to safely use, store and throw away your medicines at www.disposemymeds.org.          This list is accurate as of: 3/13/17  8:34 AM.  Always use your most recent med list.                   Brand Name Dispense Instructions for use    albuterol 108 (90 BASE) MCG/ACT Inhaler    PROAIR HFA/PROVENTIL HFA/VENTOLIN HFA    1 Inhaler    Inhale 2 puffs into the lungs every 6 hours       baclofen 20 MG tablet    LIORESAL    60 tablet    Take 1 tablet (20 mg) by mouth 2 times daily       BOTOX IJ      Reported on 3/13/2017       clonazePAM 1 MG tablet    klonoPIN    45 tablet    Take 1-2 tablets (1-2 mg) by mouth nightly as needed for anxiety Trying to reduce amount of clonazepam and see if we can get you off it.  Currently taking 1.5 mg per day.  Refill not more than monthly.       cyanocobalamin 1000 MCG tablet    vitamin  B-12     Take 1,000 mcg by mouth daily       fexofenadine-pseudoePHEDrine  MG per 12 hr  tablet    ALLEGRA-D    60 tablet    Take 1 tablet by mouth 2 times daily       fluticasone-salmeterol 45-21 MCG/ACT inhaler    ADVAIR-HFA    12 g    Inhale 2 puffs into the lungs 2 times daily       furosemide 40 MG tablet    LASIX    90 tablet    Take 1 tablet (40 mg) by mouth daily       gabapentin 300 MG capsule    NEURONTIN    180 capsule    3 caps morning and 3 caps evening.       meloxicam 15 MG tablet    MOBIC    90 tablet    Take 1 tablet (15 mg) by mouth daily       MULTI FOR HER 50+ Caps          order for DME     1 Device    Equipment being ordered: ankle, air, stirrup, universal       sertraline 100 MG tablet    ZOLOFT    45 tablet    Take 1.5 tablets (150 mg) by mouth daily       Vitamin D-3 1000 UNITS Caps      Take 5,000 Units by mouth daily

## 2017-03-13 NOTE — NURSING NOTE
"Chief Complaint   Patient presents with     RECHECK     Radiculopathy, tingling of upper extremeties.       Initial /59 (BP Location: Right arm, Patient Position: Chair, Cuff Size: Adult Large)  Pulse 83  Temp 98  F (36.7  C) (Oral)  LMP 02/24/1993 Estimated body mass index is 32.42 kg/(m^2) as calculated from the following:    Height as of 2/1/17: 5' 3\" (1.6 m).    Weight as of 2/1/17: 183 lb (83 kg).  Medication Reconciliation: complete    Patient prefers to be contacted: Mitesh Whitney to leave detailed message on voicemail: n/a    Halina PAGE-CMA    "

## 2017-03-14 ASSESSMENT — ANXIETY QUESTIONNAIRES: GAD7 TOTAL SCORE: 7

## 2017-03-14 ASSESSMENT — PATIENT HEALTH QUESTIONNAIRE - PHQ9: SUM OF ALL RESPONSES TO PHQ QUESTIONS 1-9: 6

## 2017-03-15 DIAGNOSIS — F41.9 ANXIETY: Primary | ICD-10-CM

## 2017-03-15 DIAGNOSIS — F33.0 MAJOR DEPRESSIVE DISORDER, RECURRENT EPISODE, MILD (H): ICD-10-CM

## 2017-03-15 RX ORDER — SERTRALINE HYDROCHLORIDE 100 MG/1
100 TABLET, FILM COATED ORAL DAILY
Qty: 45 TABLET | Refills: 5 | Status: SHIPPED | OUTPATIENT
Start: 2017-03-15 | End: 2017-12-11

## 2017-03-15 NOTE — TELEPHONE ENCOUNTER
Routing refill request to provider for review/approval because:  Medication is reported/historical. EPIC med list states 'patient taking deifferently' (different dose reported from what was prescribed). Please review. Thank you.      Madonna López, Pharm.D.  on behalf of [facility] Pharmacist   [email or web address]

## 2017-03-15 NOTE — TELEPHONE ENCOUNTER
Sertraline 100     Last Written Prescription Date: 6/21/16  Last Fill Quantity: 45, # refills: 5  Last Office Visit with [facility] primary care provider:  1/30/17   Next 5 appointments (look out 90 days)     Mar 16, 2017 11:20 AM CDT   SHORT with Tresa Best PA-C   [facility]    [address] [place] [id number]   [phone]                   Last PHQ-9 score on record=   PHQ-9 SCORE 3/13/2017   Total Score MyChart -   Total Score 6           Thank You!  Lizette Olivares  [facility]  P: [phone] F:[phone]

## 2017-03-16 ENCOUNTER — OFFICE VISIT (OUTPATIENT)
Dept: FAMILY MEDICINE | Facility: CLINIC | Age: 57
End: 2017-03-16
Payer: MEDICARE

## 2017-03-16 VITALS
HEART RATE: 83 BPM | WEIGHT: 183 LBS | HEIGHT: 63 IN | SYSTOLIC BLOOD PRESSURE: 115 MMHG | BODY MASS INDEX: 32.43 KG/M2 | DIASTOLIC BLOOD PRESSURE: 71 MMHG | TEMPERATURE: 97.7 F

## 2017-03-16 DIAGNOSIS — M79.672 LEFT FOOT PAIN: ICD-10-CM

## 2017-03-16 DIAGNOSIS — R20.2 PARESTHESIAS: ICD-10-CM

## 2017-03-16 DIAGNOSIS — K21.9 GASTROESOPHAGEAL REFLUX DISEASE, ESOPHAGITIS PRESENCE NOT SPECIFIED: Primary | ICD-10-CM

## 2017-03-16 PROCEDURE — 99214 OFFICE O/P EST MOD 30 MIN: CPT | Performed by: PHYSICIAN ASSISTANT

## 2017-03-16 NOTE — PATIENT INSTRUCTIONS
Try omeprazole again for your stomach  If 20 mg doesn't work in 1-2 wks, can increase to 40 mg  If not tolerated or if not working, let me know and we can try prevacid instead since nexium very expensive  If still not doing well despite omeprazole 40 or prevacid, would need to rediscuss, possible scope    Foot -  See foot doctor again - maybe he can avoid imaging that I would need

## 2017-03-16 NOTE — PROGRESS NOTES
SUBJECTIVE:                                                    Patricia Smith is a 57 year old female who presents to clinic today for the following health issues:    Chief Complaint   Patient presents with     Tingling     Right arm tingling. Follow up from Dr. Alaniz's visit.     Musculoskeletal Problem     Left ankle pain. Follow up.   Initially sent by neurology due to concern for possible TIA/stroke symptoms, however patient states has history of same symptoms with her neck problems in past.  DR Alaniz has ordered repeat neck imaging and for patient to see spine surgeon.  Patient is non smoker, no HTN, on NSAID and h/o aspirin intolerance, and 10-yr risk 2.6%.  She has no [facility] history of stroke.      L ankle - still hurting since fall off ladder in Dec.  Saw podiatry, but not improving.      GERD - has no drug coverage this yr and unable to afford her nexium which she states costs $200.  Allergy lists dizziness with omeprazole, but she doesn't remember this.  Is having a lot of symptoms with being off med.    Having more heartburn since being off nexium - no drug coverage this yr.  In past dizzy with omeprazole.    Problem list and histories reviewed & adjusted, as indicated.  Additional history: as documented    BP Readings from Last 3 Encounters:   03/16/17 115/71   03/13/17 103/59   01/30/17 123/75    Wt Readings from Last 3 Encounters:   03/16/17 183 lb (83 kg)   02/01/17 183 lb (83 kg)   01/30/17 183 lb (83 kg)         Labs reviewed in EPIC    Reviewed and updated as needed this visit by clinical staff  Tobacco  Allergies  Meds  Med Hx  Surg Hx  Fam Hx  Soc Hx      Reviewed and updated as needed this visit by Provider       ROS:  Constitutional, cardiovascular, pulmonary, GI, musculoskeletal, neuro systems are negative, except as otherwise noted.    OBJECTIVE:                                                    /71 (BP Location: Right arm, Patient Position: Chair, Cuff Size: Adult Large)  Pulse 83   "Temp 97.7  F (36.5  C) (Oral)  Ht 5' 3\" (1.6 m)  Wt 183 lb (83 kg)  LMP 02/24/1993  BMI 32.42 kg/m2  Body mass index is 32.42 kg/(m^2).  GENERAL: healthy, alert and no distress  Neuro: grossly normal CN, speech and gait  L ankle: still with edema around lateral malleolus      The 10-year ASCVD risk score (Rc AARON Jr., et al., 2013) is: 2.6%    Values used to calculate the score:      Age: 57 years      Sex: Female      Is Non- : No      Diabetic: No      Tobacco smoker: No      Systolic Blood Pressure: 115 mmHg      Is Prescribed Antihypertensives: No      HDL Cholesterol: 49 mg/dL      Total Cholesterol: 217 mg/dL       ASSESSMENT/PLAN:                                                        ICD-10-CM    1. Gastroesophageal reflux disease, esophagitis presence not specified K21.9 omeprazole (PRILOSEC) 20 MG CR capsule   2. Left foot pain M79.672    3. Paresthesias R20.2    if arm paresthesia does not improve with addressing her neck, or if further TIA symptoms, patient agrees to f/u    Patient Instructions   Try omeprazole again for your stomach  If 20 mg doesn't work in 1-2 wks, can increase to 40 mg  If not tolerated or if not working, let me know and we can try prevacid instead since nexium very expensive  If still not doing well despite omeprazole 40 or prevacid, would need to rediscuss, possible scope    Foot -  See foot doctor again - maybe he can avoid imaging that I would need          Tresa Best PA-C  [facility]  "

## 2017-03-16 NOTE — MR AVS SNAPSHOT
After Visit Summary   3/16/2017    Patricia Smith    MRN: [id number]           Patient Information     Date Of Birth          1960        Visit Information        Provider Department      3/16/2017 11:20 AM Tresa Best PA-C [facility]        Today's Diagnoses     Gastroesophageal reflux disease, esophagitis presence not specified    -  1    Left foot pain          Care Instructions    Try omeprazole again for your stomach  If 20 mg doesn't work in 1-2 wks, can increase to 40 mg  If not tolerated or if not working, let me know and we can try prevacid instead since nexium very expensive  If still not doing well despite omeprazole 40 or prevacid, would need to rediscuss, possible scope    Foot -  See foot doctor again - maybe he can avoid imaging that I would need            Follow-ups after your visit        Your next 10 appointments already scheduled     Mar 23, 2017  9:00 AM CDT   MR CERVICAL SPINE W/O CONTRAST with [address] MRI ([facility])    [address] [facility] [address]   [phone]           Take your medicines as usual, unless your doctor tells you not to. Bring a list of your current medicines to your exam (including vitamins, minerals and over-the-counter drugs). Also bring the results of similar scans you may have had.  Please remove any body piercings and hair extensions before you arrive.  Follow your doctor s orders. If you do not, we may have to postpone your exam.  You will not have contrast for this exam. You do not need to do anything special to prepare.  The MRI machine uses a strong magnet. Please wear clothes without metal (snaps, zippers). A sweatsuit works well, or we may give you a hospital gown.   **IMPORTANT** THE INSTRUCTIONS BELOW ARE ONLY FOR THOSE PATIENTS WHO HAVE BEEN TOLD THEY WILL RECEIVE SEDATION OR GENERAL ANESTHESIA DURING THEIR MRI PROCEDURE:  IF YOU WILL RECEIVE SEDATION (take medicine  to help you relax during your exam):   You must get the medicine from your doctor before you arrive. Bring the medicine to the exam. Do not take it at home.   Arrive one hour early. Bring someone who can take you home after the test. Your medicine will make you sleepy. After the exam, you may not drive, take a bus or take a taxi by yourself.   No eating 8 hours before your exam. You may have clear liquids up until 4 hours before your exam. (Clear liquids include water, clear tea, black coffee and fruit juice without pulp.)  IF YOU WILL RECEIVE ANESTHESIA (be asleep for your exam):   Arrive 1 1/2 hours early. Bring someone who can take you home after the test. You may not drive, take a bus or take a taxi by yourself.   No eating 8 hours before your exam. You may have clear liquids up until 4 hours before your exam. (Clear liquids include water, clear tea, black coffee and fruit juice without pulp.)   You will spend four to five hours in the recovery room.  Please call the Imaging Department at your exam site with any questions.            Jun 19, 2017 10:15 AM CDT   [facility]iz [facility]   [facility]    [address] [facility] [address] [phone]            Jun 28, 2017 10:15 AM CDT   Return Visit with Leonor Alaniz MD   [facility]    [address] [facility] [address]   [phone]              Who to contact     If you have questions or need follow up information about today's clinic visit or your schedule please contact [facility] directly at [phone].  Normal or non-critical lab and imaging results will be communicated to you by MyChart, letter or phone within 4 business days after the clinic has received the results. If you do not hear from us within 7 days, please contact the clinic through MyChart or phone. If you have a critical or abnormal lab result, we  "will notify you by phone as soon as possible.  Submit refill requests through [employer] or call your pharmacy and they will forward the refill request to us. Please allow 3 business days for your refill to be completed.          Additional Information About Your Visit        [employer] Information     [employer] gives you secure access to your electronic health record. If you see a primary care provider, you can also send messages to your care team and make appointments. If you have questions, please call your primary care clinic.  If you do not have a primary care provider, please call [phone] and they will assist you.        Care EveryWhere ID     This is your Care EveryWhere ID. This could be used by other organizations to access your [place] medical records  [id number]        Your Vitals Were     Pulse Temperature Height Last Period BMI (Body Mass Index)       83 97.7  F (36.5  C) (Oral) 5' 3\" (1.6 m) 02/24/1993 32.42 kg/m2        Blood Pressure from Last 3 Encounters:   03/16/17 115/71   03/13/17 103/59   01/30/17 123/75    Weight from Last 3 Encounters:   03/16/17 183 lb (83 kg)   02/01/17 183 lb (83 kg)   01/30/17 183 lb (83 kg)              Today, you had the following     No orders found for display         Today's Medication Changes          These changes are accurate as of: 3/16/17 11:57 AM.  If you have any questions, ask your nurse or doctor.               Start taking these medicines.        Dose/Directions    omeprazole 20 MG CR capsule   Commonly known as:  priLOSEC   Used for:  Gastroesophageal reflux disease, esophagitis presence not specified   Started by:  Tresa Best PA-C        Dose:  20 mg   Take 1 capsule (20 mg) by mouth daily If no benefit in 1-2 weeks, ok to increase to 40 mg per day.   Quantity:  30 capsule   Refills:  1         These medicines have changed or have updated prescriptions.        Dose/Directions    fluticasone-salmeterol 45-21 MCG/ACT inhaler   Commonly known as: "  ADVAIR-HFA   This may have changed:    - when to take this  - reasons to take this   Used for:  Mild intermittent asthma without complication        Dose:  2 puff   Inhale 2 puffs into the lungs 2 times daily   Quantity:  12 g   Refills:  1            Where to get your medicines      These medications were sent to [facility], [place] - [address] [place] [address]  [address] [place] [address]     Phone:  [phone]     omeprazole 20 MG CR capsule                Primary Care Provider Office Phone # Fax #    Tresa Best PA-C [phone]       [facility] [address] [place] [address]        Thank you!     Thank you for choosing [facility]  for your care. Our goal is always to provide you with excellent care. Hearing back from our patients is one way we can continue to improve our services. Please take a few minutes to complete the written survey that you may receive in the mail after your visit with us. Thank you!             Your Updated Medication List - Protect others around you: Learn how to safely use, store and throw away your medicines at www.disposemymeds.org.          This list is accurate as of: 3/16/17 11:57 AM.  Always use your most recent med list.                   Brand Name Dispense Instructions for use    albuterol 108 (90 BASE) MCG/ACT Inhaler    PROAIR HFA/PROVENTIL HFA/VENTOLIN HFA    1 Inhaler    Inhale 2 puffs into the lungs every 6 hours       baclofen 20 MG tablet    LIORESAL    60 tablet    Take 1 tablet (20 mg) by mouth 2 times daily       BOTOX IJ      Reported on 3/13/2017       clonazePAM 1 MG tablet    klonoPIN    45 tablet    Take 1-2 tablets (1-2 mg) by mouth nightly as needed for anxiety Trying to reduce amount of clonazepam and see if we can get you off it.  Currently taking 1.5 mg per day.  Refill not more than monthly.       cyanocobalamin 1000 MCG tablet    vitamin  B-12     Take 1,000  mcg by mouth daily       fexofenadine-pseudoePHEDrine  MG per 12 hr tablet    ALLEGRA-D    60 tablet    Take 1 tablet by mouth 2 times daily       fluticasone-salmeterol 45-21 MCG/ACT inhaler    ADVAIR-HFA    12 g    Inhale 2 puffs into the lungs 2 times daily       furosemide 40 MG tablet    LASIX    90 tablet    Take 1 tablet (40 mg) by mouth daily       gabapentin 300 MG capsule    NEURONTIN    180 capsule    3 caps morning and 3 caps evening.       meloxicam 15 MG tablet    MOBIC    90 tablet    Take 1 tablet (15 mg) by mouth daily       MULTI FOR HER 50+ Caps          omeprazole 20 MG CR capsule    priLOSEC    30 capsule    Take 1 capsule (20 mg) by mouth daily If no benefit in 1-2 weeks, ok to increase to 40 mg per day.       order for DME     1 Device    Equipment being ordered: ankle, air, stirrup, universal       sertraline 100 MG tablet    ZOLOFT    45 tablet    Take 1 tablet (100 mg) by mouth daily       Vitamin D-3 1000 UNITS Caps      Take 5,000 Units by mouth daily

## 2017-03-16 NOTE — NURSING NOTE
"Chief Complaint   Patient presents with     Tingling     Right arm tingling.      Musculoskeletal Problem     Left ankle pain.       Initial /71 (BP Location: Right arm, Patient Position: Chair, Cuff Size: Adult Large)  Pulse 83  Temp 97.7  F (36.5  C) (Oral)  Ht 5' 3\" (1.6 m)  Wt 183 lb (83 kg)  LMP 02/24/1993  BMI 32.42 kg/m2 Estimated body mass index is 32.42 kg/(m^2) as calculated from the following:    Height as of this encounter: 5' 3\" (1.6 m).    Weight as of this encounter: 183 lb (83 kg).  Medication Reconciliation: complete   Suzy Andrade LPN    "

## 2017-03-17 ASSESSMENT — ASTHMA QUESTIONNAIRES: ACT_TOTALSCORE: 24

## 2017-03-20 ENCOUNTER — MYC MEDICAL ADVICE (OUTPATIENT)
Dept: FAMILY MEDICINE | Facility: CLINIC | Age: 57
End: 2017-03-20

## 2017-03-21 ENCOUNTER — MYC MEDICAL ADVICE (OUTPATIENT)
Dept: FAMILY MEDICINE | Facility: CLINIC | Age: 57
End: 2017-03-21

## 2017-03-22 ENCOUNTER — OFFICE VISIT (OUTPATIENT)
Dept: PODIATRY | Facility: CLINIC | Age: 57
End: 2017-03-22
Payer: MEDICARE

## 2017-03-22 VITALS — WEIGHT: 183 LBS | HEIGHT: 63 IN | BODY MASS INDEX: 32.43 KG/M2

## 2017-03-22 DIAGNOSIS — M21.372 FOOT DROP, LEFT FOOT: Primary | ICD-10-CM

## 2017-03-22 DIAGNOSIS — M54.16 LUMBAR BACK PAIN WITH RADICULOPATHY AFFECTING LEFT LOWER EXTREMITY: ICD-10-CM

## 2017-03-22 PROCEDURE — 99213 OFFICE O/P EST LOW 20 MIN: CPT | Performed by: PODIATRIST

## 2017-03-22 NOTE — MR AVS SNAPSHOT
After Visit Summary   3/22/2017    Patricia Smith    MRN: [id number]           Patient Information     Date Of Birth          1960        Visit Information        Provider Department      3/22/2017 11:30 AM Helder An DPM [facility]        Today's Diagnoses     Foot drop, left foot    -  1    Lumbar back pain with radiculopathy affecting left lower extremity           Follow-ups after your visit        Additional Services     ORTHOTICS REFERRAL       Please be aware that coverage of these services is subject to the terms and limitations of your health insurance plan.  Call member services at your health plan with any benefit or coverage questions.      Please bring the following to your appointment:    >>   Any x-rays, CTs or MRIs which have been performed.  Contact the facility where they were done to arrange for  prior to your scheduled appointment.  Any new CT, MRI or other procedures ordered by your specialist must be performed at a [facility] facility or coordinated by your clinic's referral office.    >>   List of current medications   >>   This referral request   >>   Any documents/labs given to you for this referral    ==This Referral PRINTS in the [facility] (ORTHOTICS & PROSTHETICS) Central scheduling office ==     [facility] Central Scheduling staff will contact patient to arrange appointments. Central Scheduling Phone #:  JARETT Alex  [phone]     Orthotics: Left AFO (Ankle Foot Orthosis) posterior brace                  Follow-up notes from your care team     Return if symptoms worsen or fail to improve.      Your next 10 appointments already scheduled     Mar 23, 2017  9:00 AM CDT   MR CERVICAL SPINE W/O CONTRAST with [facility]   [facility] ([facility])    [address] [facility] [address] [phone]           Take your medicines as usual, unless your doctor tells you not to. Bring a  list of your current medicines to your exam (including vitamins, minerals and over-the-counter drugs). Also bring the results of similar scans you may have had.  Please remove any body piercings and hair extensions before you arrive.  Follow your doctor s orders. If you do not, we may have to postpone your exam.  You will not have contrast for this exam. You do not need to do anything special to prepare.  The MRI machine uses a strong magnet. Please wear clothes without metal (snaps, zippers). A sweatsuit works well, or we may give you a hospital gown.   **IMPORTANT** THE INSTRUCTIONS BELOW ARE ONLY FOR THOSE PATIENTS WHO HAVE BEEN TOLD THEY WILL RECEIVE SEDATION OR GENERAL ANESTHESIA DURING THEIR MRI PROCEDURE:  IF YOU WILL RECEIVE SEDATION (take medicine to help you relax during your exam):   You must get the medicine from your doctor before you arrive. Bring the medicine to the exam. Do not take it at home.   Arrive one hour early. Bring someone who can take you home after the test. Your medicine will make you sleepy. After the exam, you may not drive, take a bus or take a taxi by yourself.   No eating 8 hours before your exam. You may have clear liquids up until 4 hours before your exam. (Clear liquids include water, clear tea, black coffee and fruit juice without pulp.)  IF YOU WILL RECEIVE ANESTHESIA (be asleep for your exam):   Arrive 1 1/2 hours early. Bring someone who can take you home after the test. You may not drive, take a bus or take a taxi by yourself.   No eating 8 hours before your exam. You may have clear liquids up until 4 hours before your exam. (Clear liquids include water, clear tea, black coffee and fruit juice without pulp.)   You will spend four to five hours in the recovery room.  Please call the Imaging Department at your exam site with any questions.            Jun 19, 2017 10:15 AM CDT   [facility] with [facility]iz MD   [facility]    [address]  [facility] [address]   [phone]            Jun 28, 2017 10:15 AM CDT   Return Visit with Leonor Alaniz MD   [facility]    [address]  [facility] [address]   [phone]              Who to contact     If you have questions or need follow up information about today's clinic visit or your schedule please contact [facility] directly at [phone].  Normal or non-critical lab and imaging results will be communicated to you by [employer], letter or phone within 4 business days after the clinic has received the results. If you do not hear from us within 7 days, please contact the clinic through [employer] or phone. If you have a critical or abnormal lab result, we will notify you by phone as soon as possible.  Submit refill requests through [employer] or call your pharmacy and they will forward the refill request to us. Please allow 3 business days for your refill to be completed.          Additional Information About Your Visit        [employer] Information     [employer] gives you secure access to your electronic health record. If you see a primary care provider, you can also send messages to your care team and make appointments. If you have questions, please call your primary care clinic.  If you do not have a primary care provider, please call [phone] and they will assist you.        Care EveryWhere ID     This is your Care EveryWhere ID. This could be used by other organizations to access your [place] medical records  [id number]        Your Vitals Were     Height Last Period BMI (Body Mass Index)             1.6 m (5' 3\") 02/24/1993 32.42 kg/m2          Blood Pressure from Last 3 Encounters:   03/16/17 115/71   03/13/17 103/59   01/30/17 123/75    Weight from Last 3 Encounters:   03/22/17 83 kg (183 lb)   03/16/17 83 kg (183 lb)   02/01/17 83 kg (183 lb)              We Performed the Following     ORTHOTICS REFERRAL     "      Today's Medication Changes          These changes are accurate as of: 3/22/17 12:02 PM.  If you have any questions, ask your nurse or doctor.               These medicines have changed or have updated prescriptions.        Dose/Directions    fluticasone-salmeterol 45-21 MCG/ACT inhaler   Commonly known as:  ADVAIR-HFA   This may have changed:    - when to take this  - reasons to take this   Used for:  Mild intermittent asthma without complication        Dose:  2 puff   Inhale 2 puffs into the lungs 2 times daily   Quantity:  12 g   Refills:  1                Primary Care Provider Office Phone # Fax #    Tresa Best PA-C [phone]       [facility] [address] [address]        Thank you!     Thank you for choosing [facility]  for your care. Our goal is always to provide you with excellent care. Hearing back from our patients is one way we can continue to improve our services. Please take a few minutes to complete the written survey that you may receive in the mail after your visit with us. Thank you!             Your Updated Medication List - Protect others around you: Learn how to safely use, store and throw away your medicines at www.disposemymeds.org.          This list is accurate as of: 3/22/17 12:02 PM.  Always use your most recent med list.                   Brand Name Dispense Instructions for use    albuterol 108 (90 BASE) MCG/ACT Inhaler    PROAIR HFA/PROVENTIL HFA/VENTOLIN HFA    1 Inhaler    Inhale 2 puffs into the lungs every 6 hours       baclofen 20 MG tablet    LIORESAL    60 tablet    Take 1 tablet (20 mg) by mouth 2 times daily       BOTOX IJ      Reported on 3/13/2017       clonazePAM 1 MG tablet    klonoPIN    45 tablet    Take 1-2 tablets (1-2 mg) by mouth nightly as needed for anxiety Trying to reduce amount of clonazepam and see if we can get you off it.  Currently taking 1.5 mg per day.  Refill not more than monthly.        cyanocobalamin 1000 MCG tablet    vitamin  B-12     Take 1,000 mcg by mouth daily       fexofenadine-pseudoePHEDrine  MG per 12 hr tablet    ALLEGRA-D    60 tablet    Take 1 tablet by mouth 2 times daily       fluticasone-salmeterol 45-21 MCG/ACT inhaler    ADVAIR-HFA    12 g    Inhale 2 puffs into the lungs 2 times daily       furosemide 40 MG tablet    LASIX    90 tablet    Take 1 tablet (40 mg) by mouth daily       gabapentin 300 MG capsule    NEURONTIN    180 capsule    3 caps morning and 3 caps evening.       meloxicam 15 MG tablet    MOBIC    90 tablet    Take 1 tablet (15 mg) by mouth daily       MULTI FOR HER 50+ Caps          omeprazole 20 MG CR capsule    priLOSEC    30 capsule    Take 1 capsule (20 mg) by mouth daily If no benefit in 1-2 weeks, ok to increase to 40 mg per day.       order for DME     1 Device    Equipment being ordered: ankle, air, stirrup, universal       sertraline 100 MG tablet    ZOLOFT    45 tablet    Take 1 tablet (100 mg) by mouth daily       Vitamin D-3 1000 UNITS Caps      Take 5,000 Units by mouth daily

## 2017-03-22 NOTE — LETTER
3/22/2017       RE: Patricia Smith  [address] [facility] [address]           Dear Colleague,    Thank you for referring your patient, Patricia Smith, to the [facility]. Please see a copy of my visit note below.    PATIENT HISTORY:  Patricia Smith is a 57 year old female who presents to clinic for a painful left foot .  The patient describes the pain as burning.  The patient relates pain is located in the left foot.  The patient relates the pain has been present for the past several months.  The patient relates pain with ambulation.  The patient relates decreased muscle strength to the left lower extremity and relates frequently stubbing her left foot on the ground.      REVIEW OF SYSTEMS:  Constitutional, HEENT, cardiovascular, pulmonary, GI, , musculoskeletal, neuro, skin, endocrine and psych systems are negative, except as otherwise noted.     PAST MEDICAL HISTORY:   Past Medical History:   Diagnosis Date     Allergic rhinitis      Anxiety      Asthma      Constipation      Degeneration of cervical intervertebral disc      Degeneration of lumbosacral intervertebral disc      Degenerative disc disease      Depression      Depressive disorder      DJD (degenerative joint disease)      GERD (gastroesophageal reflux disease)      Hemorrhoids      History of blood transfusion     multiple for menorrhagia, last transfusion .  pt reports has had HIV and hep B & C testing     Hypothyroidism      Pelvic fracture (H)      Tinea corporis         PAST SURGICAL HISTORY:   Past Surgical History:   Procedure Laterality Date     ABDOMEN SURGERY      See attached Sheet     APPENDECTOMY  1977     BACK SURGERY  , , 2013     C/SECTION, CLASSICAL  x3    , Classical     ENT SURGERY       HEAD & NECK SURGERY  x3     HEMORRHOIDECTOMY  2015     HYSTERECTOMY, ELI  1993     LAMINECT/DISCECTOMY, LUMBAR  2006, 2012    Laminectomy/Discectomy Cervical     NECK SURGERY  x3     ORTHOPEDIC SURGERY        SURGICAL HISTORY OF -   1983    bowel obstruction w/adhesions     SURGICAL HISTORY OF -  Left 2007    bone spur shoulder     TONSILLECTOMY & ADENOIDECTOMY  1963        MEDICATIONS:   Current Outpatient Prescriptions:      omeprazole (PRILOSEC) 20 MG CR capsule, Take 1 capsule (20 mg) by mouth daily If no benefit in 1-2 weeks, ok to increase to 40 mg per day., Disp: 30 capsule, Rfl: 1     sertraline (ZOLOFT) 100 MG tablet, Take 1 tablet (100 mg) by mouth daily, Disp: 45 tablet, Rfl: 5     gabapentin (NEURONTIN) 300 MG capsule, 3 caps morning and 3 caps evening., Disp: 180 capsule, Rfl: 5     baclofen (LIORESAL) 20 MG tablet, Take 1 tablet (20 mg) by mouth 2 times daily, Disp: 60 tablet, Rfl: 3     clonazePAM (KLONOPIN) 1 MG tablet, Take 1-2 tablets (1-2 mg) by mouth nightly as needed for anxiety Trying to reduce amount of clonazepam and see if we can get you off it.  Currently taking 1.5 mg per day.  Refill not more than monthly., Disp: 45 tablet, Rfl: 1     meloxicam (MOBIC) 15 MG tablet, Take 1 tablet (15 mg) by mouth daily, Disp: 90 tablet, Rfl: 1     order for DME, Equipment being ordered: ankle, air, stirrup, universal, Disp: 1 Device, Rfl: 0     fexofenadine-pseudoePHEDrine (ALLEGRA-D)  MG per tablet, Take 1 tablet by mouth 2 times daily, Disp: 60 tablet, Rfl: 11     fluticasone-salmeterol (ADVAIR-HFA) 45-21 MCG/ACT inhaler, Inhale 2 puffs into the lungs 2 times daily (Patient taking differently: Inhale 2 puffs into the lungs 2 times daily as needed ), Disp: 12 g, Rfl: 1     albuterol (PROAIR HFA, PROVENTIL HFA, VENTOLIN HFA) 108 (90 BASE) MCG/ACT inhaler, Inhale 2 puffs into the lungs every 6 hours, Disp: 1 Inhaler, Rfl: 3     Multiple Vitamins-Minerals (MULTI FOR HER 50+) CAPS, , Disp: , Rfl:      furosemide (LASIX) 40 MG tablet, Take 1 tablet (40 mg) by mouth daily, Disp: 90 tablet, Rfl: 3     Cholecalciferol (VITAMIN D-3) 1000 UNITS CAPS, Take 5,000 Units by mouth daily , Disp: , Rfl:       "cyanocobalamin (VITAMIN  B-12) 1000 MCG tablet, Take 1,000 mcg by mouth daily, Disp: , Rfl:      OnabotulinumtoxinA (BOTOX IJ), Reported on 3/13/2017, Disp: , Rfl:      ALLERGIES:    Allergies   Allergen Reactions     Bactrim [Sulfamethoxazole W/Trimethoprim] Hives     Codeine Hives     Erythromycin GI Disturbance     Hydrocodone Hives     Lortab [Hydrocodone-Acetaminophen] Hives     Omeprazole      Dizzy       Penicillins Hives        SOCIAL HISTORY:   Social History     Social History     Marital status:      Spouse name: N/A     Number of children: N/A     Years of education: N/A     Occupational History     Not on file.     Social History Main Topics     Smoking status: Former Smoker     Packs/day: 1.00     Years: 15.00     Types: Cigarettes     Smokeless tobacco: Never Used      Comment: started at age 19.  Smoked about 15 years     Alcohol use No     Drug use: No     Sexual activity: Not Currently     Partners: Male     Birth control/ protection: Abstinence, Female Surgical     Other Topics Concern     Parent/Sibling W/ Cabg, Mi Or Angioplasty Before 65f 55m? Yes     I think my Brother Nicholas Kwan     Social History Narrative        FAMILY HISTORY:   Family History   Problem Relation Age of Onset     DIABETES Mother      Depression Mother      Anxiety Disorder Mother      Asthma Mother      Hypertension Father      Hyperlipidemia Father      Other Cancer Sister      Ovarian     Coronary Artery Disease Brother      Coronary Artery Disease Brother      in hs 50s     Other Cancer Sister      Ovarian     Depression Sister      Anxiety Disorder Nephew      Anxiety Disorder Nephew      Asthma Sister      Thyroid Disease Sister      Breast Cancer No family hx of      Colon Cancer No family hx of         EXAM:Vitals: Ht 1.6 m (5' 3\")  Wt 83 kg (183 lb)  LMP 02/24/1993  BMI 32.42 kg/m2  BMI= Body mass index is 32.42 kg/(m^2).    Weight management plan: Patient was referred to their PCP to discuss a diet and " exercise plan.    General appearance: Patient is alert and fully cooperative with history & exam.  No sign of distress is noted during the visit.     Psychiatric: Affect is pleasant & appropriate.  Patient appears motivated to improve health.     Respiratory: Breathing is regular & unlabored while sitting.     HEENT: Hearing is intact to spoken word.  Speech is clear.  No gross evidence of visual impairment that would impact ambulation.     Dermatologic: Skin is intact to both lower extremities without significant lesions, rash or abrasion.  No paronychia or evidence of soft tissue infection is noted.     Vascular: DP & PT pulses are intact & regular bilaterally.  No significant edema or varicosities noted.  CFT and skin temperature is normal to both lower extremities.     Neurologic: Lower extremity sensation is intact to light touch.  Noted evidence of weakness to the left lower extremity.  No evidence of neuropathy.     Musculoskeletal: Patient is ambulatory without assistive device or brace.  No gross ankle deformity noted.  No foot or ankle joint effusion is noted.          ASSESSMENT / PLAN:     ICD-10-CM    1. Foot drop, left foot M21.372    2. Lumbar back pain with radiculopathy affecting left lower extremity M54.17 ORTHOTICS REFERRAL       I have explained to Patricia  about the conditions.  We discussed the nature of the condition as well as the treatment plan and expected length of recovery.  At this time, the patient was referred to [facility] for custom AFO posterior brace that will aid in dorsiflexing the left ankle and preventing falls.        Disclaimer: This note consists of symbols derived from keyboarding, dictation and/or voice recognition software. As a result, there may be errors in the script that have gone undetected. Please consider this when interpreting information found in this chart.       FRANKLIN VicentePSLICK., FDAOC.F.A.S.        Again, thank you for allowing me  to participate in the care of your patient.        Sincerely,              Helder An DPM

## 2017-03-23 ENCOUNTER — HOSPITAL ENCOUNTER (OUTPATIENT)
Dept: MRI IMAGING | Facility: CLINIC | Age: 57
Discharge: HOME | End: 2017-03-23
Attending: PSYCHIATRY & NEUROLOGY | Admitting: PSYCHIATRY & NEUROLOGY
Payer: MEDICARE

## 2017-03-23 DIAGNOSIS — M54.2 CERVICALGIA: ICD-10-CM

## 2017-03-23 DIAGNOSIS — R20.2 PARESTHESIAS: ICD-10-CM

## 2017-03-23 DIAGNOSIS — Z98.890 HISTORY OF SPINAL SURGERY: ICD-10-CM

## 2017-03-23 PROCEDURE — 72141 MRI NECK SPINE W/O DYE: CPT

## 2017-03-24 NOTE — PROGRESS NOTES
Please advise Patricia Smith,  1960, that her cervical spine MRI is stable. I recommend she see the spine specialist as discussed in clinic. [phone] (home)   Leonor Alaniz

## 2017-03-29 ENCOUNTER — MYC MEDICAL ADVICE (OUTPATIENT)
Dept: FAMILY MEDICINE | Facility: CLINIC | Age: 57
End: 2017-03-29

## 2017-03-31 NOTE — PROGRESS NOTES
PATIENT HISTORY:  Patriica Smith is a 57 year old female who presents to clinic for a painful left foot .  The patient describes the pain as burning.  The patient relates pain is located in the left foot.  The patient relates the pain has been present for the past several months.  The patient relates pain with ambulation.  The patient relates decreased muscle strength to the left lower extremity and relates frequently stubbing her left foot on the ground.      REVIEW OF SYSTEMS:  Constitutional, HEENT, cardiovascular, pulmonary, GI, , musculoskeletal, neuro, skin, endocrine and psych systems are negative, except as otherwise noted.     PAST MEDICAL HISTORY:   Past Medical History:   Diagnosis Date     Allergic rhinitis      Anxiety      Asthma      Constipation      Degeneration of cervical intervertebral disc      Degeneration of lumbosacral intervertebral disc      Degenerative disc disease      Depression      Depressive disorder      DJD (degenerative joint disease)      GERD (gastroesophageal reflux disease)      Hemorrhoids      History of blood transfusion     multiple for menorrhagia, last transfusion .  pt reports has had HIV and hep B & C testing     Hypothyroidism      Pelvic fracture (H)      Tinea corporis         PAST SURGICAL HISTORY:   Past Surgical History:   Procedure Laterality Date     ABDOMEN SURGERY      See attached Sheet     APPENDECTOMY  1977     BACK SURGERY  , , 2013     C/SECTION, CLASSICAL  x3    , Classical     ENT SURGERY       HEAD & NECK SURGERY  x3     HEMORRHOIDECTOMY  2015     HYSTERECTOMY, ELI  1993     LAMINECT/DISCECTOMY, LUMBAR  2006, 2012    Laminectomy/Discectomy Cervical     NECK SURGERY  x3     ORTHOPEDIC SURGERY       SURGICAL HISTORY OF -       bowel obstruction w/adhesions     SURGICAL HISTORY OF -  Left     bone spur shoulder     TONSILLECTOMY & ADENOIDECTOMY  1963        MEDICATIONS:   Current Outpatient Prescriptions:      omeprazole  (PRILOSEC) 20 MG CR capsule, Take 1 capsule (20 mg) by mouth daily If no benefit in 1-2 weeks, ok to increase to 40 mg per day., Disp: 30 capsule, Rfl: 1     sertraline (ZOLOFT) 100 MG tablet, Take 1 tablet (100 mg) by mouth daily, Disp: 45 tablet, Rfl: 5     gabapentin (NEURONTIN) 300 MG capsule, 3 caps morning and 3 caps evening., Disp: 180 capsule, Rfl: 5     baclofen (LIORESAL) 20 MG tablet, Take 1 tablet (20 mg) by mouth 2 times daily, Disp: 60 tablet, Rfl: 3     clonazePAM (KLONOPIN) 1 MG tablet, Take 1-2 tablets (1-2 mg) by mouth nightly as needed for anxiety Trying to reduce amount of clonazepam and see if we can get you off it.  Currently taking 1.5 mg per day.  Refill not more than monthly., Disp: 45 tablet, Rfl: 1     meloxicam (MOBIC) 15 MG tablet, Take 1 tablet (15 mg) by mouth daily, Disp: 90 tablet, Rfl: 1     order for DME, Equipment being ordered: ankle, air, stirrup, universal, Disp: 1 Device, Rfl: 0     fexofenadine-pseudoePHEDrine (ALLEGRA-D)  MG per tablet, Take 1 tablet by mouth 2 times daily, Disp: 60 tablet, Rfl: 11     fluticasone-salmeterol (ADVAIR-HFA) 45-21 MCG/ACT inhaler, Inhale 2 puffs into the lungs 2 times daily (Patient taking differently: Inhale 2 puffs into the lungs 2 times daily as needed ), Disp: 12 g, Rfl: 1     albuterol (PROAIR HFA, PROVENTIL HFA, VENTOLIN HFA) 108 (90 BASE) MCG/ACT inhaler, Inhale 2 puffs into the lungs every 6 hours, Disp: 1 Inhaler, Rfl: 3     Multiple Vitamins-Minerals (MULTI FOR HER 50+) CAPS, , Disp: , Rfl:      furosemide (LASIX) 40 MG tablet, Take 1 tablet (40 mg) by mouth daily, Disp: 90 tablet, Rfl: 3     Cholecalciferol (VITAMIN D-3) 1000 UNITS CAPS, Take 5,000 Units by mouth daily , Disp: , Rfl:      cyanocobalamin (VITAMIN  B-12) 1000 MCG tablet, Take 1,000 mcg by mouth daily, Disp: , Rfl:      OnabotulinumtoxinA (BOTOX IJ), Reported on 3/13/2017, Disp: , Rfl:      ALLERGIES:    Allergies   Allergen Reactions     Bactrim [Sulfamethoxazole  "W/Trimethoprim] Hives     Codeine Hives     Erythromycin GI Disturbance     Hydrocodone Hives     Lortab [Hydrocodone-Acetaminophen] Hives     Omeprazole      Dizzy       Penicillins Hives        SOCIAL HISTORY:   Social History     Social History     Marital status:      Spouse name: N/A     Number of children: N/A     Years of education: N/A     Occupational History     Not on file.     Social History Main Topics     Smoking status: Former Smoker     Packs/day: 1.00     Years: 15.00     Types: Cigarettes     Smokeless tobacco: Never Used      Comment: started at age 19.  Smoked about 15 years     Alcohol use No     Drug use: No     Sexual activity: Not Currently     Partners: Male     Birth control/ protection: Abstinence, Female Surgical     Other Topics Concern     Parent/Sibling W/ Cabg, Mi Or Angioplasty Before 65f 55m? Yes     I think my Brother Nicholas Kwan     Social History Narrative        FAMILY HISTORY:   Family History   Problem Relation Age of Onset     DIABETES Mother      Depression Mother      Anxiety Disorder Mother      Asthma Mother      Hypertension Father      Hyperlipidemia Father      Other Cancer Sister      Ovarian     Coronary Artery Disease Brother      Coronary Artery Disease Brother      in hs 50s     Other Cancer Sister      Ovarian     Depression Sister      Anxiety Disorder Nephew      Anxiety Disorder Nephew      Asthma Sister      Thyroid Disease Sister      Breast Cancer No family hx of      Colon Cancer No family hx of         EXAM:Vitals: Ht 1.6 m (5' 3\")  Wt 83 kg (183 lb)  LMP 02/24/1993  BMI 32.42 kg/m2  BMI= Body mass index is 32.42 kg/(m^2).    Weight management plan: Patient was referred to their PCP to discuss a diet and exercise plan.    General appearance: Patient is alert and fully cooperative with history & exam.  No sign of distress is noted during the visit.     Psychiatric: Affect is pleasant & appropriate.  Patient appears motivated to improve health.   "   Respiratory: Breathing is regular & unlabored while sitting.     HEENT: Hearing is intact to spoken word.  Speech is clear.  No gross evidence of visual impairment that would impact ambulation.     Dermatologic: Skin is intact to both lower extremities without significant lesions, rash or abrasion.  No paronychia or evidence of soft tissue infection is noted.     Vascular: DP & PT pulses are intact & regular bilaterally.  No significant edema or varicosities noted.  CFT and skin temperature is normal to both lower extremities.     Neurologic: Lower extremity sensation is intact to light touch.  Noted evidence of weakness to the left lower extremity.  No evidence of neuropathy.     Musculoskeletal: Patient is ambulatory without assistive device or brace.  No gross ankle deformity noted.  No foot or ankle joint effusion is noted.          ASSESSMENT / PLAN:     ICD-10-CM    1. Foot drop, left foot M21.372    2. Lumbar back pain with radiculopathy affecting left lower extremity M54.17 ORTHOTICS REFERRAL       I have explained to Patricia  about the conditions.  We discussed the nature of the condition as well as the treatment plan and expected length of recovery.  At this time, the patient was referred to [facility] for custom AFO posterior brace that will aid in dorsiflexing the left ankle and preventing falls.        Disclaimer: This note consists of symbols derived from keyboarding, dictation and/or voice recognition software. As a result, there may be errors in the script that have gone undetected. Please consider this when interpreting information found in this chart.       MILLER An D.P.M., [email or web address].FDAOS.

## 2017-04-04 DIAGNOSIS — R60.0 PERIPHERAL EDEMA: ICD-10-CM

## 2017-04-04 RX ORDER — FUROSEMIDE 40 MG
40 TABLET ORAL DAILY
Qty: 30 TABLET | Refills: 0 | Status: SHIPPED | OUTPATIENT
Start: 2017-04-04 | End: 2017-05-06

## 2017-04-04 NOTE — TELEPHONE ENCOUNTER
Furosemide 40mg       Last Written Prescription Date: 2/24/16  Last Fill Quantity: 90, # refills: 3  Last Office Visit with G, P or [facility] prescribing provider: 3/16/17  Next 5 appointments (look out 90 days)     Jun 19, 2017 10:15 AM CDT   [facility] with Leonor [facility]iz MD   [facility]    [address] [place] [address]   [phone]            Jun 28, 2017 10:15 AM CDT   Return Visit with Leonor Alaniz MD   [facility]    [address] [address]   [phone]                   Potassium   Date Value Ref Range Status   02/24/2016 4.1 3.4 - 5.3 mmol/L Final     Creatinine   Date Value Ref Range Status   02/24/2016 0.97 0.52 - 1.04 mg/dL Final     BP Readings from Last 3 Encounters:   03/16/17 115/71   03/13/17 103/59   01/30/17 123/75     Thank you!  Rachael Arzola   [facility]  P: [phone] F: [phone]

## 2017-04-04 NOTE — TELEPHONE ENCOUNTER
Medication is being filled for 1 time refill only due to:  Patient needs labs KDavid.     Emelia Peck, PharmD  [facility]  On behalf of [facility]

## 2017-05-08 ENCOUNTER — MYC MEDICAL ADVICE (OUTPATIENT)
Dept: FAMILY MEDICINE | Facility: CLINIC | Age: 57
End: 2017-05-08

## 2017-05-08 DIAGNOSIS — M21.372 FOOT DROP, LEFT: Primary | ICD-10-CM

## 2017-05-08 DIAGNOSIS — M79.673 PAIN OF FOOT, UNSPECIFIED LATERALITY: ICD-10-CM

## 2017-05-09 ENCOUNTER — DOCUMENTATION ONLY (OUTPATIENT)
Dept: LAB | Facility: CLINIC | Age: 57
End: 2017-05-09

## 2017-05-09 DIAGNOSIS — R60.0 PERIPHERAL EDEMA: Primary | ICD-10-CM

## 2017-05-10 DIAGNOSIS — R60.0 PERIPHERAL EDEMA: ICD-10-CM

## 2017-05-10 DIAGNOSIS — N18.30 CKD (CHRONIC KIDNEY DISEASE) STAGE 3, GFR 30-59 ML/MIN (H): Primary | ICD-10-CM

## 2017-05-10 LAB
ANION GAP SERPL CALCULATED.3IONS-SCNC: 8 MMOL/L (ref 3–14)
BUN SERPL-MCNC: 13 MG/DL (ref 7–30)
CALCIUM SERPL-MCNC: 9.4 MG/DL (ref 8.5–10.1)
CHLORIDE SERPL-SCNC: 101 MMOL/L (ref 94–109)
CO2 SERPL-SCNC: 27 MMOL/L (ref 20–32)
CREAT SERPL-MCNC: 1.04 MG/DL (ref 0.52–1.04)
GFR SERPL CREATININE-BSD FRML MDRD: 54 ML/MIN/1.7M2
GLUCOSE SERPL-MCNC: 91 MG/DL (ref 70–99)
POTASSIUM SERPL-SCNC: 4.2 MMOL/L (ref 3.4–5.3)
SODIUM SERPL-SCNC: 136 MMOL/L (ref 133–144)

## 2017-05-10 PROCEDURE — 36415 COLL VENOUS BLD VENIPUNCTURE: CPT | Performed by: PHYSICIAN ASSISTANT

## 2017-05-10 PROCEDURE — 80048 BASIC METABOLIC PNL TOTAL CA: CPT | Performed by: PHYSICIAN ASSISTANT

## 2017-05-10 NOTE — LETTER
[facility]  [address] [place] [address]  [phone]        May 16, 2018    Patricia Smith  [address] [facility] [address]              Dear Patricia Smith    This is to remind you that you have blood and urine labs due.    You may call our office at [phone] to schedule an appointment.    Please disregard this notice if you have already had your labs drawn or made an appointment.        Sincerely,        Tresa Best PA-C

## 2017-05-11 PROBLEM — N18.30 CKD (CHRONIC KIDNEY DISEASE) STAGE 3, GFR 30-59 ML/MIN (H): Status: ACTIVE | Noted: 2017-05-11

## 2017-05-11 RX ORDER — FUROSEMIDE 40 MG
TABLET ORAL
Qty: 90 TABLET | Refills: 3 | Status: SHIPPED | OUTPATIENT
Start: 2017-05-11 | End: 2018-06-20

## 2017-05-11 NOTE — PROGRESS NOTES
Donna Gomez,    Your kidney function is stable from 1 year ago.  Whenever you do labs next or are in clinic next, I have also ordered a urine lab.  This is not urgent.    Please call clinic at [phone] if you have questions.    Tresa Best PA-C

## 2017-05-18 ENCOUNTER — TRANSFERRED RECORDS (OUTPATIENT)
Dept: HEALTH INFORMATION MANAGEMENT | Facility: CLINIC | Age: 57
End: 2017-05-18

## 2017-05-20 DIAGNOSIS — G89.29 CHRONIC LOW BACK PAIN, UNSPECIFIED BACK PAIN LATERALITY, WITH SCIATICA PRESENCE UNSPECIFIED: ICD-10-CM

## 2017-05-20 DIAGNOSIS — R25.3 MUSCLE TWITCHING: ICD-10-CM

## 2017-05-20 DIAGNOSIS — M54.5 CHRONIC LOW BACK PAIN, UNSPECIFIED BACK PAIN LATERALITY, WITH SCIATICA PRESENCE UNSPECIFIED: ICD-10-CM

## 2017-05-20 NOTE — TELEPHONE ENCOUNTER
Clonazepam 1      Last Written Prescription Date:  3/13/17  Last Fill Quantity: 45,   # refills: 1  Last Office Visit with [facility], [facility] or [facility] prescribing provider: 3/16*17  Future Office visit:    Next 5 appointments (look out 90 days)     Jun 19, 2017 10:15 AM CDT   Return Visit with Leonor Alaniz MD   [facility]    [address] [address]   [phone]            Jun 28, 2017 10:15 AM CDT   Return Visit with Leonor Alaniz MD   [facility]    [address] [address]   [phone]                   Routing refill request to provider for review/approval because:  Drug not on the [facility], [facility] or [facility] refill protocol or controlled substance      Thank You!  Lizette Olivares  [facility]  P: [phone] F:[phone]

## 2017-05-22 RX ORDER — CLONAZEPAM 1 MG/1
1-2 TABLET ORAL
Qty: 45 TABLET | Refills: 1 | Status: SHIPPED | OUTPATIENT
Start: 2017-05-22 | End: 2017-06-28

## 2017-05-22 NOTE — TELEPHONE ENCOUNTER
Please see refill request.  Isabel HALL RN BSN PHN  [facility]ty Clin[facility]        LOV 3/13/17.  Patient Instructions:  Repeat Cervical Spine MRI.  Referral to Orthopedics/Spine specialist. If they approve, I recommend you start physical therapy/exercise program.   Return to clinic in 3-4 months (after the above).

## 2017-06-27 ENCOUNTER — MEDICAL CORRESPONDENCE (OUTPATIENT)
Dept: MRI IMAGING | Facility: CLINIC | Age: 57
End: 2017-06-27

## 2017-06-28 ENCOUNTER — OFFICE VISIT (OUTPATIENT)
Dept: NEUROLOGY | Facility: CLINIC | Age: 57
End: 2017-06-28
Payer: OTHER MISCELLANEOUS

## 2017-06-28 VITALS — TEMPERATURE: 97.8 F | DIASTOLIC BLOOD PRESSURE: 77 MMHG | SYSTOLIC BLOOD PRESSURE: 118 MMHG | HEART RATE: 87 BPM

## 2017-06-28 DIAGNOSIS — M54.5 CHRONIC LOW BACK PAIN, UNSPECIFIED BACK PAIN LATERALITY, WITH SCIATICA PRESENCE UNSPECIFIED: ICD-10-CM

## 2017-06-28 DIAGNOSIS — R29.898 WEAKNESS OF LEFT FOOT: ICD-10-CM

## 2017-06-28 DIAGNOSIS — M47.12 CERVICAL SPONDYLOSIS WITH MYELOPATHY: ICD-10-CM

## 2017-06-28 DIAGNOSIS — R25.3 MUSCLE TWITCHING: ICD-10-CM

## 2017-06-28 DIAGNOSIS — Z87.39 HISTORY OF BURNING PAIN IN LOWER EXTREMITY: Primary | ICD-10-CM

## 2017-06-28 DIAGNOSIS — G89.29 CHRONIC LOW BACK PAIN, UNSPECIFIED BACK PAIN LATERALITY, WITH SCIATICA PRESENCE UNSPECIFIED: ICD-10-CM

## 2017-06-28 PROCEDURE — 99215 OFFICE O/P EST HI 40 MIN: CPT | Performed by: PSYCHIATRY & NEUROLOGY

## 2017-06-28 RX ORDER — BACLOFEN 20 MG/1
20 TABLET ORAL 2 TIMES DAILY
Qty: 60 TABLET | Refills: 3 | Status: SHIPPED | OUTPATIENT
Start: 2017-06-28 | End: 2017-11-13

## 2017-06-28 RX ORDER — CLONAZEPAM 1 MG/1
1-2 TABLET ORAL
Qty: 45 TABLET | Refills: 1 | Status: SHIPPED | OUTPATIENT
Start: 2017-06-28 | End: 2017-11-17

## 2017-06-28 NOTE — NURSING NOTE
Chief Complaint   Patient presents with     RECHECK     follow up back pain & Paresthesias       Vitals:    06/28/17 1018   BP: 118/77   Pulse: 87   Temp: 97.8  F (36.6  C)   TempSrc: Oral     Wt Readings from Last 1 Encounters:   03/22/17 83 kg (183 lb)       Patient prefers to be contacted by: [phone]    Okay to leave detailed message on voicemail: yes    Veronica Alfonso LPN.................6/28/2017

## 2017-06-28 NOTE — PATIENT INSTRUCTIONS
Plan:    Nerve conduction studies/ electromyogram.   Continue the gabapentin at the current dose: 900mg twice daily.  As needed baclofen for the neck pain/spasms.   I agree with weaning off of the Klonopin. I recommend you decrease to 1mg nightly for 1 week, then further cut doses in half: 1mg/0.5mg every other night the following week, then 0.5mg nightly for a week, then every other night, then stop).   Follow-up with your spine specialist regarding the lumbar imaging and electromyogram results.   Continue daily exercises.   Return to clinic in 6 months.

## 2017-06-28 NOTE — MR AVS SNAPSHOT
After Visit Summary   6/28/2017    Patricia Smith    MRN: [id number]           Patient Information     Date Of Birth          1960        Visit Information        Provider Department      6/28/2017 10:15 AM Leonor Alaniz MD [facility]        Today's Diagnoses     History of burning pain in lower extremity    -  1    Chronic low back pain, unspecified back pain laterality, with sciatica presence unspecified        Muscle twitching        Weakness of left foot          Care Instructions    Plan:    Nerve conduction studies/ electromyogram.   Continue the gabapentin at the current dose: 900mg twice daily.  As needed baclofen for the neck pain/spasms.   I agree with weaning off of the Klonopin. I recommend you decrease to 1mg nightly for 1 week, then further cut doses in half: 1mg/0.5mg every other night the following week, then 0.5mg nightly for a week, then every other night, then stop).   Follow-up with your spine specialist regarding the lumbar imaging and electromyogram results.   Continue daily exercises.   Return to clinic in 6 months.           Follow-ups after your visit        Additional Services     ORTHO  REFERRAL       [facility] is referring you to the Orthopedic  Services at [facility].       The  Representative will assist you in the coordination of your Orthopedic and Musculoskeletal Care as prescribed by your physician.    The  Representative will call you within 1 business day to help schedule your appointment, or you may contact the  Representative at:    All areas ~ [phone]     Type of Referral : Non Surgical - electromyogram of LEs for burning pain and Left foot weakness      Timeframe requested: Routine    Coverage of these services is subject to the terms and limitations of your health insurance plan.  Please call member services at your health plan with any benefit or  coverage questions.      If X-rays, CT or MRI's have been performed, please contact the facility where they were done to arrange for , prior to your scheduled appointment.  Please bring this referral request to your appointment and present it to your specialist.                  Future tests that were ordered for you today     Open Future Orders        Priority Expected Expires Ordered    MR Ankle Left w/o Contrast Routine  6/27/2018 6/27/2017            Who to contact     If you have questions or need follow up information about today's clinic visit or your schedule please contact [facility] directly at [phone].  Normal or non-critical lab and imaging results will be communicated to you by [employer], letter or phone within 4 business days after the clinic has received the results. If you do not hear from us within 7 days, please contact the clinic through [employer] or phone. If you have a critical or abnormal lab result, we will notify you by phone as soon as possible.  Submit refill requests through [employer] or call your pharmacy and they will forward the refill request to us. Please allow 3 business days for your refill to be completed.          Additional Information About Your Visit        MyChart Information     [employer] gives you secure access to your electronic health record. If you see a primary care provider, you can also send messages to your care team and make appointments. If you have questions, please call your primary care clinic.  If you do not have a primary care provider, please call [phone] and they will assist you.        Care EveryWhere ID     This is your Care EveryWhere ID. This could be used by other organizations to access your [facility] medical records  [id number]        Your Vitals Were     Pulse Temperature Last Period             87 97.8  F (36.6  C) (Oral) 02/24/1993          Blood Pressure from Last 3 Encounters:   06/28/17 118/77   03/16/17 115/71   03/13/17  103/59    Weight from Last 3 Encounters:   03/22/17 183 lb (83 kg)   03/16/17 183 lb (83 kg)   02/01/17 183 lb (83 kg)              We Performed the Following     ORTHO  REFERRAL          Today's Medication Changes          These changes are accurate as of: 6/28/17 10:55 AM.  If you have any questions, ask your nurse or doctor.               These medicines have changed or have updated prescriptions.        Dose/Directions    fluticasone-salmeterol 45-21 MCG/ACT inhaler   Commonly known as:  ADVAIR-HFA   This may have changed:    - when to take this  - reasons to take this   Used for:  Mild intermittent asthma without complication        Dose:  2 puff   Inhale 2 puffs into the lungs 2 times daily   Quantity:  12 g   Refills:  1            Where to get your medicines      These medications were sent to [place] [address]     Phone:  [phone]     baclofen 20 MG tablet         Some of these will need a paper prescription and others can be bought over the counter.  Ask your nurse if you have questions.     Bring a paper prescription for each of these medications     clonazePAM 1 MG tablet                Primary Care Provider Office Phone # Fax #    Tresa Best PA-C [phone]       [place] [id number]        Equal Access to Services     KATTY HINTON : Hadii karly ku hadasho Soomaali, waaxda luqadaha, qaybta kaalmada adeegyada, gay luna. So [facility] [phone].    ATENCIÓN: Si habla español, tiene a dillon disposición servicios gratuitos de asistencia lingüística. Hunter al [phone].    We comply with applicable federal civil rights laws and [place] laws. We do not discriminate on the basis of race, color, national origin, age, disability sex, sexual orientation or gender identity.            Thank you!     Thank you for  choosing [facility]  for your care. Our goal is always to provide you with excellent care. Hearing back from our patients is one way we can continue to improve our services. Please take a few minutes to complete the written survey that you may receive in the mail after your visit with us. Thank you!             Your Updated Medication List - Protect others around you: Learn how to safely use, store and throw away your medicines at www.disposemymeds.org.          This list is accurate as of: 6/28/17 10:55 AM.  Always use your most recent med list.                   Brand Name Dispense Instructions for use Diagnosis    albuterol 108 (90 BASE) MCG/ACT Inhaler    PROAIR HFA/PROVENTIL HFA/VENTOLIN HFA    1 Inhaler    Inhale 2 puffs into the lungs every 6 hours    Mild intermittent asthma without complication       baclofen 20 MG tablet    LIORESAL    60 tablet    Take 1 tablet (20 mg) by mouth 2 times daily    Chronic low back pain, unspecified back pain laterality, with sciatica presence unspecified, Muscle twitching       BOTOX IJ      Reported on 3/13/2017        clonazePAM 1 MG tablet    klonoPIN    45 tablet    Take 1-2 tablets (1-2 mg) by mouth nightly as needed for anxiety Trying to reduce amount of clonazepam and see if we can get you off it.  Currently taking 1.5 mg per day.  Refill not more than monthly.    Chronic low back pain, unspecified back pain laterality, with sciatica presence unspecified, Muscle twitching       cyanocobalamin 1000 MCG tablet    vitamin  B-12     Take 1,000 mcg by mouth daily        fexofenadine-pseudoePHEDrine  MG per 12 hr tablet    ALLEGRA-D    60 tablet    Take 1 tablet by mouth 2 times daily    Allergic rhinitis due to pollen       fluticasone-salmeterol 45-21 MCG/ACT inhaler    ADVAIR-HFA    12 g    Inhale 2 puffs into the lungs 2 times daily    Mild intermittent asthma without complication       furosemide 40 MG tablet    LASIX    90 tablet    TAKE ONE TABLET  BY MOUTH EVERY DAY    Peripheral edema       gabapentin 300 MG capsule    NEURONTIN    180 capsule    3 caps morning and 3 caps evening.    Chronic low back pain, unspecified back pain laterality, with sciatica presence unspecified, Muscle twitching       meloxicam 15 MG tablet    MOBIC    90 tablet    Take 1 tablet (15 mg) by mouth daily    Chronic low back pain, unspecified back pain laterality, with sciatica presence unspecified       MULTI FOR HER 50+ Caps           omeprazole 20 MG CR capsule    priLOSEC    30 capsule    Take 1 capsule (20 mg) by mouth daily If no benefit in 1-2 weeks, ok to increase to 40 mg per day.    Gastroesophageal reflux disease, esophagitis presence not specified       order for DME     1 Device    Equipment being ordered: ankle, air, stirrup, universal    Sprain of left ankle, unspecified ligament, initial encounter       sertraline 100 MG tablet    ZOLOFT    45 tablet    Take 1 tablet (100 mg) by mouth daily    Anxiety, Major depressive disorder, recurrent episode, mild (H)       Vitamin D-3 1000 UNITS Caps      Take 5,000 Units by mouth daily

## 2017-06-28 NOTE — PROGRESS NOTES
ESTABLISHED PATIENT NEUROLOGY NOTE    DATE OF VISIT: 6/28/2017  MRN: [id number]  PATIENT NAME: Patricia Smith  YOB: 1960    Chief Complaint   Patient presents with     RECHECK     follow up back pain & Paresthesias     SUBJECTIVE:                                                      HISTORY OF PRESENT ILLNESS:  Patricia is here for follow up regarding chronic back pain and paresthesias of the arms and legs. The patient was complaining of new tingling in her Right arm at her previous visit so we repeated her cervical imaging (she has history of surgical fusion and stenosis with degenerative changes in the upper and lower back). There was no appreciable change from the imaging completed last year. I recommended Patricia see a spine specialist. It appears that she saw someone in orthopedics (Dr. Smith?) but the focus was on her lumbar spine and LLE. I do not have these notes, but the patient tells me that nothing has been discussed in regard to the cervical stenosis. She also saw Dr. An (podiatry) for burning in the Left foot and increased tripping over the foot. She was diagnosed with foot drop and sent for AFO. I had noted minimal weakness of the distal LLE on my previous exam.  The patient says she saw a Dr. Mckeon after seeing Juve, and plans to have an ankle MRI completed. She is not wearing an orthotic at this time, though she does have one.     I reviewed her prior records from [place] previously, and note that she had Botox injections for myelopathy in the past. The patient confirms this.    The patient is on gabapentin, baclofen and clonazepam for the pain and some twitching/spasms. She say that overall her pain is well-managed. She also notes more spasms since she is weaning off the Klonopin (now taking 1/1.5 every other night).    Her right arm still goes numb while driving at times. The only new symptom is burning in the thighs. She says that sometimes this radiates down from the lowe back, sometimes  it occurs separately. She denies changes in bladder or bowel function.        CURRENT MEDICATIONS:     Current Outpatient Prescriptions on File Prior to Visit:  furosemide (LASIX) 40 MG tablet TAKE ONE TABLET BY MOUTH EVERY DAY   omeprazole (PRILOSEC) 20 MG CR capsule Take 1 capsule (20 mg) by mouth daily If no benefit in 1-2 weeks, ok to increase to 40 mg per day.   sertraline (ZOLOFT) 100 MG tablet Take 1 tablet (100 mg) by mouth daily   gabapentin (NEURONTIN) 300 MG capsule 3 caps morning and 3 caps evening.   meloxicam (MOBIC) 15 MG tablet Take 1 tablet (15 mg) by mouth daily   order for DME Equipment being ordered: ankle, air, stirrup, universal   fexofenadine-pseudoePHEDrine (ALLEGRA-D)  MG per tablet Take 1 tablet by mouth 2 times daily   fluticasone-salmeterol (ADVAIR-HFA) 45-21 MCG/ACT inhaler Inhale 2 puffs into the lungs 2 times daily (Patient taking differently: Inhale 2 puffs into the lungs 2 times daily as needed )   albuterol (PROAIR HFA, PROVENTIL HFA, VENTOLIN HFA) 108 (90 BASE) MCG/ACT inhaler Inhale 2 puffs into the lungs every 6 hours   Multiple Vitamins-Minerals (MULTI FOR HER 50+) CAPS    Cholecalciferol (VITAMIN D-3) 1000 UNITS CAPS Take 5,000 Units by mouth daily    cyanocobalamin (VITAMIN  B-12) 1000 MCG tablet Take 1,000 mcg by mouth daily   [DISCONTINUED] clonazePAM (KLONOPIN) 1 MG tablet Take 1-2 tablets (1-2 mg) by mouth nightly as needed for anxiety Trying to reduce amount of clonazepam and see if we can get you off it.  Currently taking 1.5 mg per day.  Refill not more than monthly.   OnabotulinumtoxinA (BOTOX IJ) Reported on 3/13/2017     No current facility-administered medications on file prior to visit.     RECENT DIAGNOSTIC STUDIES:   Labs:   Results for orders placed or performed in visit on 05/10/17   **Basic metabolic panel FUTURE 1yr   Result Value Ref Range    Sodium 136 133 - 144 mmol/L    Potassium 4.2 3.4 - 5.3 mmol/L    Chloride 101 94 - 109 mmol/L    Carbon Dioxide  27 20 - 32 mmol/L    Anion Gap 8 3 - 14 mmol/L    Glucose 91 70 - 99 mg/dL    Urea Nitrogen 13 7 - 30 mg/dL    Creatinine 1.04 0.52 - 1.04 mg/dL    GFR Estimate 54 (L) >60 mL/min/1.7m2    GFR Estimate If Black 66 >60 mL/min/1.7m2    Calcium 9.4 8.5 - 10.1 mg/dL       Imaging:  MRI Cervical Spine (3.23.17):  IMPRESSION:  1. Anterior fusions as described previously C4-C7.  2. Central stenosis, especially at C4-C5 as previously described and  unchanged.  3. Mild to moderate foraminal stenosis C4-C5 and C5-C6, also unchanged  in the interval.    Imaging reviewed by me. Agree with radiology read.     REVIEW OF SYSTEMS:                                                      10-point review of systems is negative except as mentioned above in HPI.     EXAM:                                                      Physical Exam:   Vitals: /77  Pulse 87  Temp 97.8  F (36.6  C) (Oral)  LMP 02/24/1993  BMI= There is no height or weight on file to calculate BMI.  HEENT: Neck is supple. Normal ROM.  Neurologic:  MENTAL STATUS: Alert, attentive. Speech is fluent. Normal comprehension. Normal concentration. Adequate fund of knowledge.    CRANIAL NERVES: PERRL. Facial movement normal. EOM full. Hearing intact to conversation. SCM and Trapezius strength intact. Palate elevates symmetrically. Tongue midline.   MOTOR: 4+/5 with Left dorsiflexion and inversion/eversion, Otherwise 5/5 in proximal and distal muscle groups of upper and lower extremities. Tone and bulk normal.    DTRs: Brisk and symmetric in UEs. Brisk with spread at patellae.  Ankle jerks present.  SENSATION: Normal light touch and pinprick on the Right. Patchy decrease in sensation to light touch and pinprick in LLE. Intact proprioception. Vibration: Slightly decreased at Left ankle.    COORDINATION: Normal finger nose finger. Knee heel shin normal.  STATION AND GAIT: Romberg negative. Gait is slightly wide, antalgic.   Striaght leg raise test is positive for back pain  with LLE raise.     ASSESSMENT and PLAN:                                                      Assessment and Plan:    ICD-10-CM    1. History of burning pain in lower extremity Z87.39 ORTHO  REFERRAL   2. Chronic low back pain, unspecified back pain laterality, with sciatica presence unspecified M54.5 clonazePAM (KLONOPIN) 1 MG tablet    G89.29 baclofen (LIORESAL) 20 MG tablet     ORTHO  REFERRAL   3. Muscle twitching R25.3 clonazePAM (KLONOPIN) 1 MG tablet     baclofen (LIORESAL) 20 MG tablet   4. Weakness of left foot M21.42 ORTHO  REFERRAL   5. Cervical spondylosis with myelopathy M47.12        Ms. Smith is a pleasant 56 yo woman with migrating pain and paresthesias in the limbs with an extensive history of degenerative spine disease s/p multiple surgeries. I explained to the patient that her problems can be managed through orthopedics at this point, though I am providing some medication refills and recommending additional LE testing with electromyogram for the new leg pain and foot drop. If the current symptoms cannot be handled through orthopedics (surgical or non-surgical, as deemed appropriate), I am happy to see her back for symptomatic management. We will try to get the recent ortho notes so that I have a better understanding of the long-term plan for Patricia.    I did recommend continues exercise, weight loss, loose-fitting clothes. The thigh pain she describes could be meralgia paresthetica.     The patient understands and agrees with the plan.     Patient Instructions:  Nerve conduction studies/ electromyogram.   Continue the gabapentin at the current dose: 900mg twice daily.  As needed baclofen for the neck pain/spasms.   I agree with weaning off of the Klonopin. I recommend you decrease to 1mg nightly for 1 week, then further cut doses in half: 1mg/0.5mg every other night the following week, then 0.5mg nightly for a week, then every other night, then stop).   Follow-up with  your spine specialist regarding the lumbar imaging and electromyogram results.   Continue daily exercises.   Return to clinic in 6 months.     Total Time: 40 minutes were spent with the patient. More than 50% of the time spent on counseling (as described above in Assessment and Plan)/coordinating the care.    Leonor Alaniz MD  Neurology

## 2017-06-30 DIAGNOSIS — M54.5 CHRONIC LOW BACK PAIN, UNSPECIFIED BACK PAIN LATERALITY, WITH SCIATICA PRESENCE UNSPECIFIED: ICD-10-CM

## 2017-06-30 DIAGNOSIS — G89.29 CHRONIC LOW BACK PAIN, UNSPECIFIED BACK PAIN LATERALITY, WITH SCIATICA PRESENCE UNSPECIFIED: ICD-10-CM

## 2017-07-03 RX ORDER — MELOXICAM 15 MG/1
TABLET ORAL
Qty: 90 TABLET | Refills: 0 | Status: SHIPPED | OUTPATIENT
Start: 2017-07-03 | End: 2017-10-26

## 2017-07-03 NOTE — TELEPHONE ENCOUNTER
Meloxicam      Last Written Prescription Date: 01/9/17  Last Quantity: 90, # refills: 1  Last Office Visit with [facility], [facility] or [facility] prescribing provider: 03/16/17       Creatinine   Date Value Ref Range Status   05/10/2017 1.04 0.52 - 1.04 mg/dL Final     Lab Results   Component Value Date    AST 18 10/04/2016     Lab Results   Component Value Date    ALT 34 10/04/2016     BP Readings from Last 3 Encounters:   06/28/17 118/77   03/16/17 115/71   03/13/17 103/59

## 2017-07-11 ENCOUNTER — HOSPITAL ENCOUNTER (OUTPATIENT)
Dept: MRI IMAGING | Facility: CLINIC | Age: 57
End: 2017-07-11
Attending: PHYSICAL MEDICINE & REHABILITATION
Payer: MEDICARE

## 2017-07-11 ENCOUNTER — HOSPITAL ENCOUNTER (OUTPATIENT)
Dept: MRI IMAGING | Facility: CLINIC | Age: 57
Discharge: HOME | End: 2017-07-11
Attending: PODIATRIST | Admitting: PODIATRIST
Payer: MEDICARE

## 2017-07-11 DIAGNOSIS — M48.061 STENOSIS, SPINAL, LUMBAR: ICD-10-CM

## 2017-07-11 DIAGNOSIS — M25.572 LEFT ANKLE PAIN, UNSPECIFIED CHRONICITY: ICD-10-CM

## 2017-07-11 LAB
CREAT BLD-MCNC: 1.1 MG/DL (ref 0.52–1.04)
GFR SERPL CREATININE-BSD FRML MDRD: 51 ML/MIN/1.7M2

## 2017-07-11 PROCEDURE — 72158 MRI LUMBAR SPINE W/O & W/DYE: CPT

## 2017-07-11 PROCEDURE — 25000128 H RX IP 250 OP 636: Performed by: PHYSICAL MEDICINE & REHABILITATION

## 2017-07-11 PROCEDURE — 73721 MRI JNT OF LWR EXTRE W/O DYE: CPT | Mod: LT

## 2017-07-11 PROCEDURE — 82565 ASSAY OF CREATININE: CPT

## 2017-07-11 PROCEDURE — A9585 GADOBUTROL INJECTION: HCPCS | Performed by: PHYSICAL MEDICINE & REHABILITATION

## 2017-07-11 RX ORDER — GADOBUTROL 604.72 MG/ML
8 INJECTION INTRAVENOUS ONCE
Status: COMPLETED | OUTPATIENT
Start: 2017-07-11 | End: 2017-07-11

## 2017-07-11 RX ADMIN — GADOBUTROL 8 ML: 604.72 INJECTION INTRAVENOUS at 10:10

## 2017-07-12 ENCOUNTER — TRANSFERRED RECORDS (OUTPATIENT)
Dept: HEALTH INFORMATION MANAGEMENT | Facility: CLINIC | Age: 57
End: 2017-07-12

## 2017-07-14 ENCOUNTER — MYC MEDICAL ADVICE (OUTPATIENT)
Dept: FAMILY MEDICINE | Facility: CLINIC | Age: 57
End: 2017-07-14

## 2017-07-19 NOTE — NURSING NOTE
GALLO again faxed to [facility] [phone].  Transmission confirmed via Right Fax.

## 2017-08-12 DIAGNOSIS — K21.9 GASTROESOPHAGEAL REFLUX DISEASE, ESOPHAGITIS PRESENCE NOT SPECIFIED: ICD-10-CM

## 2017-08-14 NOTE — TELEPHONE ENCOUNTER
omeprazole (PRILOSEC) 20 MG CR capsule      Last Written Prescription Date: 3/16/17  Last Fill Quantity: 30,  # refills: 1   Last Office Visit with [facility], [facility] or [facility] prescribing provider: 6/28/17

## 2017-08-27 DIAGNOSIS — J30.1 ALLERGIC RHINITIS DUE TO POLLEN, UNSPECIFIED CHRONICITY, UNSPECIFIED SEASONALITY: Primary | ICD-10-CM

## 2017-08-27 NOTE — TELEPHONE ENCOUNTER
Hailey BENÍTEZ      Last Written Prescription Date: 07/16/17  Last Fill Quantity: 60,  # refills: 0   Last Office Visit with [facility], [facility] or [facility] prescribing provider: 03/16/17

## 2017-08-28 RX ORDER — FEXOFENADINE HCL AND PSEUDOEPHEDRINE HCI 60; 120 MG/1; MG/1
1 TABLET, EXTENDED RELEASE ORAL 2 TIMES DAILY
Qty: 60 TABLET | Refills: 11 | Status: SHIPPED | OUTPATIENT
Start: 2017-08-28 | End: 2018-10-05

## 2017-09-06 ENCOUNTER — HOSPITAL ENCOUNTER (OUTPATIENT)
Dept: PHYSICAL THERAPY | Facility: CLINIC | Age: 57
Setting detail: THERAPIES SERIES
End: 2017-09-06
Attending: PHYSICIAN ASSISTANT
Payer: OTHER MISCELLANEOUS

## 2017-09-06 PROCEDURE — 97110 THERAPEUTIC EXERCISES: CPT | Mod: GP

## 2017-09-06 PROCEDURE — 97162 PT EVAL MOD COMPLEX 30 MIN: CPT | Mod: GP

## 2017-09-06 PROCEDURE — 40000718 ZZHC STATISTIC PT DEPARTMENT ORTHO VISIT

## 2017-09-06 NOTE — PROGRESS NOTES
" 09/06/17 1400   General Information   Type of Visit Initial OP Ortho PT Evaluation   Start of Care Date 09/06/17   Referring Physician Patrick Muhammad MD ([facility])   Patient/Family Goals Statement pt wants to walk 1 mile, easier to perform car transfers, lifting medium wt, standing for 30min   Orders Evaluate and Treat   Date of Order 08/28/17   Insurance Type Other   Insurance Comments/Visits Authorized WC [employer]: auth after eval   Medical Diagnosis spinal stendosis, DDD   Surgical/Medical history reviewed Yes   Precautions/Limitations no known precautions/limitations   General Information Comments PMHX/Co-morbidities: Hypothyroidism; esophageal reflux; depression with anxiety; asthma; unexplained wt gain   Presentation and Etiology   Pertinent history of current problem (include personal factors and/or comorbidities that impact the POC) Per Corbin WERNER note 6/28/17: \"Her right arm still goes numb while driving at times. The only new symptom is burning in the thighs. She says that sometimes this radiates down from the low back, sometimes it occurs separately. She denies changes in bladder or bowel function.\" Pt tripped over poorly placed car battery at work and had a fall. When pt is tired, more drop foot on L, more pain in both sides of back. Pt just goes with the flow, literaly. Sugery on L foot on 9/28/17, 3 weeks. Neck hurts today, too. Just got done working 5 days in a row, not normal. Pt has myopothy from neck. Re-started memebership to local gym.   Impairments A. Pain;E. Decreased flexibility;G. Impaired balance;H. Impaired gait;K. Numbness;J. Burning;L. Tingling   Functional Limitations perform activities of daily living;perform required work activities;perform desired leisure / sports activities   Symptom Location B low back back, shooting on L lat thigh, to ant thigh (burning), to fibual, to L foot   How/Where did it occur At work;With a fall   Onset date of current episode/exacerbation 06/06/17 " "  Chronicity Chronic  (recent flair up)   Pain rating (0-10 point scale) Best (/10);Worst (/10)   Best (/10) 1   Worst (/10) 6   Pain quality C. Aching;E. Shooting;D. Burning   Frequency of pain/symptoms A. Constant   Pain/symptoms exacerbated by G. Certain positions  (standing)   Pain/symptoms eased by A. Sitting;C. Rest;G. Heat   Current Level of Function   Patient role/employment history A. Employed;G. Disabled   Employment Comments [employer]   Fall Risk Screen   Fall screen completed by PT   Per patient - Fall 2 or more times in past year? Yes  (drop foot)   Per patient - Fall with injury in past year? No   Timed Up and Go score (seconds) 14.5   Is patient a fall risk? Yes;Department fall risk interventions implemented   Fall screen comments \"preventing falls at home\" booklet provided   System Outcome Measures   Outcome Measures Low Back Pain (see Oswestry and Juliana)   Lumbar Spine/SI Objective Findings   Gait/Locomotion With mild external rotation of L foot, maintained trunk flex, slow speed, wide IMTIAZ   Flexion ROM to toes (feels good)   Extension ROM limited 50% (pain)   Right Side Bending ROM to sup patella   Left Side Bending ROM to 3cm proximal to sup patella (pain)   Hip Flexion (L2) Strength Rt=5/5; Lt=3+/5   Knee Flexion Strength Rt=5/5; Lt=4-/5   Knee Extension (L3) Strength Rt=5/5; Lt=3+/5   Ankle Dorsiflexion (L4) Strength Rt=5/5; Lt=2+/5   Great Toe Extension (L5) Strength Rt=5/5; Lt=3/5   Ankle Plantar Flexion (S1) Strength Rt=5/5; Lt=4/5   Lumbar/Hip/Knee/Foot Strength Comments B hip IR=4/5 ER=3+/5   Patellar Tendon Reflexes  R=4+, L=2+   Achilles Tendon Reflexes 4+ mary   Neurological Testing Comments Per pt: has (+) Vital's ever since surgery 1999   Slump Test -   Pelvic Screen WNL   SLR -   Palpation hypertonicity in B lumbar paraspinals   Sensation Testing L5-S1 nerve root impingement per pt numbness/pain pattern   Planned Therapy Interventions   Planned Therapy Interventions balance " training;ADL retraining;IADL retraining;fine motor coordination training;gait training;joint mobilization;manual therapy;motor coordination training;neuromuscular re-education;ROM;strengthening;stretching;transfer training   Planned Modality Interventions   Planned Modality Interventions Cryotherapy;TENS   Clinical Impression   Criteria for Skilled Therapeutic Interventions Met yes, treatment indicated   PT Diagnosis Low back pain; Global myotomal weakness of the L LE, impaired gait and endurance   Influenced by the following impairments weakness; poor endurance; limited mobility; hypertsensitivity, hypertonicity, L5-S1 nerve root impingement , fall risk   Functional limitations due to impairments walk 1 mile, easier to perform car transfers, lifting medium wt, standing for 30min   Clinical Presentation Evolving/Changing   Clinical Presentation Rationale (-) multiple co-morbidities, high risk yvonne, moderatly disability ОЛЕГ, multiple sugeries to abdomen (+) good motivation, prior success with skilled PT   Clinical Decision Making (Complexity) Moderate complexity   Therapy Frequency 2 times/day   Predicted Duration of Therapy Intervention (days/wks) 8 weeks   Risk & Benefits of therapy have been explained Yes   Patient, Family & other staff in agreement with plan of care Yes   Clinical Impression Comments Pt is a pleasant woman with LBP starting 20 years ago, confirmed stenosis on MRI, accompanying neck pain. Pt has had multiple abdominal surgeries contributing to impaired status. Pt presents with weakness per MMT, L5-S1 nerve root impingement per dermatomal pattern, limited mobility per AROM, hypersensitivity per pain rating after leslie stretching, fall risk per TUG. Pt is appropriate for skilled PT to address impairments and improve function.    Education Assessment   Preferred Learning Style Listening;Reading;Demonstration;Pictures/video   Barriers to Learning No barriers   ORTHO GOALS   PT Ortho Eval Goals  1;2;3;4   Ortho Goal 1   Goal Identifier easier to perform car transfers   Goal Description Following PT interventions, pt will increase lubmar L Side bend AROM to fingertips to sup patella for make it easier for pt to perform car transfers   Target Date 09/20/17   Ortho Goal 2   Goal Identifier standing >30min   Goal Description Following PT interventions, pt will amb with full up right posture to be able to standing >30min   Target Date 11/01/17   Ortho Goal 3   Goal Identifier walk 1 mile   Goal Description Following PT interventions, pt will increase B hip ER to 4/5 MMT to be able to walk 1 mile   Target Date 10/18/17   Ortho Goal 4   Goal Identifier lifting medium wt   Goal Description Following PT interventions, pt will score 20% on ОЛЕГ to be able to lift medium weight at work.   Target Date 11/01/17   Total Evaluation Time   Total Evaluation Time 25min   Therapy Certification   Certification date from 09/06/17   Certification date to 11/01/17   Medical Diagnosis spinal stendosis, DDD   Helder Oh, PT, DPT   Doctor of Physical Therapy # [id number]  [facility]  [phone]

## 2017-09-14 ENCOUNTER — HOSPITAL ENCOUNTER (OUTPATIENT)
Dept: PHYSICAL THERAPY | Facility: CLINIC | Age: 57
Setting detail: THERAPIES SERIES
End: 2017-09-14
Attending: PHYSICIAN ASSISTANT
Payer: OTHER MISCELLANEOUS

## 2017-09-14 ENCOUNTER — OFFICE VISIT (OUTPATIENT)
Dept: FAMILY MEDICINE | Facility: CLINIC | Age: 57
End: 2017-09-14
Payer: MEDICARE

## 2017-09-14 VITALS
BODY MASS INDEX: 31.61 KG/M2 | SYSTOLIC BLOOD PRESSURE: 100 MMHG | HEIGHT: 63 IN | TEMPERATURE: 97.6 F | HEART RATE: 84 BPM | DIASTOLIC BLOOD PRESSURE: 62 MMHG | WEIGHT: 178.4 LBS

## 2017-09-14 DIAGNOSIS — T14.8XXA TORN LIGAMENT: ICD-10-CM

## 2017-09-14 DIAGNOSIS — J45.20 MILD INTERMITTENT ASTHMA WITHOUT COMPLICATION: ICD-10-CM

## 2017-09-14 DIAGNOSIS — R25.3 MUSCLE TWITCHING: ICD-10-CM

## 2017-09-14 DIAGNOSIS — K21.9 GASTROESOPHAGEAL REFLUX DISEASE, ESOPHAGITIS PRESENCE NOT SPECIFIED: ICD-10-CM

## 2017-09-14 DIAGNOSIS — G89.29 CHRONIC LOW BACK PAIN, UNSPECIFIED BACK PAIN LATERALITY, WITH SCIATICA PRESENCE UNSPECIFIED: ICD-10-CM

## 2017-09-14 DIAGNOSIS — G47.00 INSOMNIA, UNSPECIFIED TYPE: ICD-10-CM

## 2017-09-14 DIAGNOSIS — M54.5 CHRONIC LOW BACK PAIN, UNSPECIFIED BACK PAIN LATERALITY, WITH SCIATICA PRESENCE UNSPECIFIED: ICD-10-CM

## 2017-09-14 DIAGNOSIS — Z78.0 POST-MENOPAUSAL: ICD-10-CM

## 2017-09-14 DIAGNOSIS — Z01.818 PREOP GENERAL PHYSICAL EXAM: Primary | ICD-10-CM

## 2017-09-14 DIAGNOSIS — F41.9 ANXIETY: ICD-10-CM

## 2017-09-14 DIAGNOSIS — Z23 NEED FOR PROPHYLACTIC VACCINATION AND INOCULATION AGAINST INFLUENZA: ICD-10-CM

## 2017-09-14 DIAGNOSIS — Z79.899 HIGH RISK MEDICATIONS (NOT ANTICOAGULANTS) LONG-TERM USE: ICD-10-CM

## 2017-09-14 DIAGNOSIS — I25.2 OLD MI (MYOCARDIAL INFARCTION): ICD-10-CM

## 2017-09-14 DIAGNOSIS — N18.30 CKD (CHRONIC KIDNEY DISEASE) STAGE 3, GFR 30-59 ML/MIN (H): ICD-10-CM

## 2017-09-14 DIAGNOSIS — Z91.89 AT HIGH RISK FOR OSTEOPOROSIS: ICD-10-CM

## 2017-09-14 PROCEDURE — 36415 COLL VENOUS BLD VENIPUNCTURE: CPT | Performed by: PHYSICIAN ASSISTANT

## 2017-09-14 PROCEDURE — 97110 THERAPEUTIC EXERCISES: CPT | Mod: GP

## 2017-09-14 PROCEDURE — 90686 IIV4 VACC NO PRSV 0.5 ML IM: CPT | Performed by: PHYSICIAN ASSISTANT

## 2017-09-14 PROCEDURE — 99215 OFFICE O/P EST HI 40 MIN: CPT | Mod: 25 | Performed by: PHYSICIAN ASSISTANT

## 2017-09-14 PROCEDURE — 80048 BASIC METABOLIC PNL TOTAL CA: CPT | Performed by: PHYSICIAN ASSISTANT

## 2017-09-14 PROCEDURE — 93000 ELECTROCARDIOGRAM COMPLETE: CPT | Performed by: PHYSICIAN ASSISTANT

## 2017-09-14 PROCEDURE — 90471 IMMUNIZATION ADMIN: CPT | Performed by: PHYSICIAN ASSISTANT

## 2017-09-14 PROCEDURE — 40000718 ZZHC STATISTIC PT DEPARTMENT ORTHO VISIT

## 2017-09-14 RX ORDER — GABAPENTIN 300 MG/1
CAPSULE ORAL
Qty: 270 CAPSULE | Refills: 5 | Status: SHIPPED | OUTPATIENT
Start: 2017-09-14 | End: 2018-04-06

## 2017-09-14 ASSESSMENT — ANXIETY QUESTIONNAIRES
6. BECOMING EASILY ANNOYED OR IRRITABLE: SEVERAL DAYS
7. FEELING AFRAID AS IF SOMETHING AWFUL MIGHT HAPPEN: NOT AT ALL
1. FEELING NERVOUS, ANXIOUS, OR ON EDGE: SEVERAL DAYS
2. NOT BEING ABLE TO STOP OR CONTROL WORRYING: SEVERAL DAYS
5. BEING SO RESTLESS THAT IT IS HARD TO SIT STILL: NOT AT ALL
3. WORRYING TOO MUCH ABOUT DIFFERENT THINGS: SEVERAL DAYS
GAD7 TOTAL SCORE: 5

## 2017-09-14 ASSESSMENT — PATIENT HEALTH QUESTIONNAIRE - PHQ9
SUM OF ALL RESPONSES TO PHQ QUESTIONS 1-9: 9
5. POOR APPETITE OR OVEREATING: SEVERAL DAYS

## 2017-09-14 NOTE — PROGRESS NOTES
Injectable Influenza Immunization Documentation    1.  Are you sick today? (Fever of 100.5 or higher on the day of the clinic)   No    2.  Have you ever had Guillain-[place] Syndrome within 6 weeks of an influenza vaccionation?  No    3. Do you have a life-threatening allergy to eggs?  No    4. Do you have a life-threatening allergy to a component of the vaccine? May include antibiotics, gelatin or latex.  No     5. Have you ever had a reaction to a dose of flu vaccine that needed immediate medical attention?  No     Form completed by CHEMA Frank

## 2017-09-14 NOTE — NURSING NOTE
"Chief Complaint   Patient presents with     Pre-Op Exam     Flu Shot     /62 (BP Location: Right arm, Patient Position: Chair, Cuff Size: Adult Regular)  Pulse 84  Temp 97.6  F (36.4  C) (Tympanic)  Ht 5' 3\" (1.6 m)  Wt 178 lb 6.4 oz (80.9 kg)  LMP 02/24/1993  BMI 31.6 kg/m2 Estimated body mass index is 31.6 kg/(m^2) as calculated from the following:    Height as of this encounter: 5' 3\" (1.6 m).    Weight as of this encounter: 178 lb 6.4 oz (80.9 kg).  bp completed using cuff size: regular      Health Maintenance that is potentially due pending provider review:  PHQ9    n/a  "

## 2017-09-14 NOTE — PROGRESS NOTES
[facility]  [address] [place] [id number]  [phone]  Dept: [phone]    PRE-OP EVALUATION:  Today's date: 2017    Patricia Smith (: 1960) presents for pre-operative evaluation assessment as requested by Dr. Cleaning.  She requires evaluation and anesthesia risk assessment prior to undergoing surgery/procedure for treatment of left ankle .  Proposed procedure: treatment of left ankle    Date of Surgery/ Procedure: 17  Time of Surgery/ Procedure: [facility]  Hospital/Surgical Facility:  [facility]    Primary Physician: Tresa Best  Type of Anesthesia Anticipated: to be determined    Patient has a Health Care Directive or Living Will:  NO    1. NO - Do you have a history of heart attack, stroke, stent, bypass or surgery on an artery in the head, neck, heart or legs?  2. NO - Do you ever have any pain or discomfort in your chest?  3. NO - Do you have a history of  Heart Failure?  4. NO - Are you troubled by shortness of breath when: walking on the level, up a slight hill or at night?  5. NO - Do you currently have a cold, bronchitis or other respiratory infection?  6. NO - Do you have a cough, shortness of breath or wheezing?  7. NO - Do you sometimes get pains in the calves of your legs when you walk?  8. NO - Do you or anyone in your family have previous history of blood clots?  9. NO - Do you or does anyone in your family have a serious bleeding problem such as prolonged bleeding following surgeries or cuts?  10. YES - HAVE YOU EVER HAD PROBLEMS WITH ANEMIA OR BEEN TOLD TO TAKE IRON PILLS? NO  11. NO - Have you had any abnormal blood loss such as black, tarry or bloody stools, or abnormal vaginal bleeding?  12. YES - HAVE YOU EVER HAD A BLOOD TRANSFUSION? Related to menses  13. NO - Have you or any of your relatives ever had problems with anesthesia?  14. NO - Do you have sleep apnea, excessive snoring or daytime drowsiness?  15. NO - Do you have  any prosthetic heart valves?  16. NO - Do you have prosthetic joints?  17. NO - Is there any chance that you may be pregnant?        HPI:                                                      Brief HPI related to upcoming procedure: torn ligaments of ankle    See problem list for active medical problems.  Problems all longstanding and stable, except as noted/documented.  See ROS for pertinent symptoms related to these conditions.                                                                                                    She has intermittent asthma that requires advair only seasonally.  This is going well.  ACT today is 24.    She has depression and anxiety.  She is on zoloft 100.  She has been on chronic clonazepam and is working to get off of this.  Neurology is currently prescribling.  Clonazepam is currently at usually 1 mg per night.  Currently she is having some insomnia.      Chronic back pain - on gabapentin and trying to hold off on back fusion.      GERD - has been on nexium since 1990s.  Recently had to switch omeprazole.  Has not tolerated zantac.  Apparently as never had endoscopy.    MEDICAL HISTORY:                                                    Patient Active Problem List    Diagnosis Date Noted     CKD (chronic kidney disease) stage 3, GFR 30-59 ml/min 05/11/2017     Priority: Medium     Anxiety 08/15/2016     Priority: Medium     See my 6/30/16 note and My Charts after.  Patient has been on clonazepam x 2 yrs, sleep and anxiety.  She has had to have dose increases.  Tried decrease with modest success.  Isn't interested in trying other meds.  She has also discussed this with her daughter who is in PA school.       Mild intermittent asthma 05/18/2016     Priority: Medium     Chronic back pain 11/19/2015     Priority: Medium     Depression with anxiety 09/14/2015     Priority: Medium     DJD (degenerative joint disease) of knee 09/14/2015     Priority: Medium     Hypothyroidism 09/14/2015      Priority: Medium     Esophageal reflux 2015     Priority: Medium      Past Medical History:   Diagnosis Date     Allergic rhinitis      Anxiety      Asthma      Constipation      Degeneration of cervical intervertebral disc      Degeneration of lumbosacral intervertebral disc      Degenerative disc disease      Depression      Depressive disorder      DJD (degenerative joint disease)      GERD (gastroesophageal reflux disease)      Hemorrhoids      History of blood transfusion     multiple for menorrhagia, last transfusion .  pt reports has had HIV and hep B & C testing     Hypothyroidism      Pelvic fracture (H)      Tinea corporis      Past Surgical History:   Procedure Laterality Date     ABDOMEN SURGERY      See attached Sheet     APPENDECTOMY  1977     BACK SURGERY  , ,      C/SECTION, CLASSICAL  x3    , Classical     ENT SURGERY       HEAD & NECK SURGERY  x3     HEMORRHOIDECTOMY  2015     HYSTERECTOMY, ELI  1993     LAMINECT/DISCECTOMY, LUMBAR  ,     Laminectomy/Discectomy Cervical     NECK SURGERY  x3     ORTHOPEDIC SURGERY       SURGICAL HISTORY OF -       bowel obstruction w/adhesions     SURGICAL HISTORY OF -  Left     bone spur shoulder     TONSILLECTOMY & ADENOIDECTOMY  1963     Current Outpatient Prescriptions   Medication Sig Dispense Refill     gabapentin (NEURONTIN) 300 MG capsule 3 caps morning and 4 caps evening. 270 capsule 5     fexofenadine-pseudoePHEDrine (ALLEGRA-D)  MG per 12 hr tablet Take 1 tablet by mouth 2 times daily 60 tablet 11     omeprazole (PRILOSEC) 20 MG CR capsule TAKE ONE CAPSULE BY MOUTH EVERY DAY. IF NO BENEFIT IN 1-2 WEEKS, OK TO INCREASE TO TAKE TWO CAPSULES BY MOUTH EVERY DAY 30 capsule 1     meloxicam (MOBIC) 15 MG tablet TAKE ONE TABLET BY MOUTH EVERY DAY 90 tablet 0     clonazePAM (KLONOPIN) 1 MG tablet Take 1-2 tablets (1-2 mg) by mouth nightly as needed for anxiety Trying to reduce amount of clonazepam and see if we  can get you off it.  Currently taking 1.5 mg per day.  Refill not more than monthly. 45 tablet 1     baclofen (LIORESAL) 20 MG tablet Take 1 tablet (20 mg) by mouth 2 times daily 60 tablet 3     furosemide (LASIX) 40 MG tablet TAKE ONE TABLET BY MOUTH EVERY DAY 90 tablet 3     sertraline (ZOLOFT) 100 MG tablet Take 1 tablet (100 mg) by mouth daily 45 tablet 5     order for DME Equipment being ordered: ankle, air, stirrup, universal 1 Device 0     fluticasone-salmeterol (ADVAIR-HFA) 45-21 MCG/ACT inhaler Inhale 2 puffs into the lungs 2 times daily (Patient taking differently: Inhale 2 puffs into the lungs 2 times daily as needed ) 12 g 1     albuterol (PROAIR HFA, PROVENTIL HFA, VENTOLIN HFA) 108 (90 BASE) MCG/ACT inhaler Inhale 2 puffs into the lungs every 6 hours 1 Inhaler 3     Multiple Vitamins-Minerals (MULTI FOR HER 50+) CAPS        Cholecalciferol (VITAMIN D-3) 1000 UNITS CAPS Take 5,000 Units by mouth daily        cyanocobalamin (VITAMIN  B-12) 1000 MCG tablet Take 1,000 mcg by mouth daily       [DISCONTINUED] gabapentin (NEURONTIN) 300 MG capsule 3 caps morning and 3 caps evening. 180 capsule 5     OnabotulinumtoxinA (BOTOX IJ) Reported on 3/13/2017       OTC products: None, except as noted above and no use of herbal medications or other supplements    Allergies   Allergen Reactions     Bactrim [Sulfamethoxazole W/Trimethoprim] Hives     Codeine Hives     Erythromycin GI Disturbance     Hydrocodone Hives     Lortab [Hydrocodone-Acetaminophen] Hives     Penicillins Hives      Latex Allergy: NO    Social History   Substance Use Topics     Smoking status: Former Smoker     Packs/day: 1.00     Years: 15.00     Types: Cigarettes     Smokeless tobacco: Never Used      Comment: started at age 19.  Smoked about 15 years     Alcohol use No     History   Drug Use No       REVIEW OF SYSTEMS:                                                    Constitutional, neuro, ENT, endocrine, pulmonary, cardiac, gastrointestinal,  "genitourinary, musculoskeletal, integument and psychiatric systems are negative, except as otherwise noted.      EXAM:                                                    /62 (BP Location: Right arm, Patient Position: Chair, Cuff Size: Adult Regular)  Pulse 84  Temp 97.6  F (36.4  C) (Tympanic)  Ht 5' 3\" (1.6 m)  Wt 178 lb 6.4 oz (80.9 kg)  LMP 02/24/1993  BMI 31.6 kg/m2    GENERAL APPEARANCE: healthy, alert and no distress     EYES: EOMI, PERRL     HENT: ear canals and TM's normal and nose and mouth without ulcers or lesions     NECK: no adenopathy, no asymmetry, masses, or scars and thyroid normal to palpation     RESP: lungs clear to auscultation - no rales, rhonchi or wheezes     CV: regular rates and rhythm, normal S1 S2, no S3 or S4 and no murmur, click or rub     ABDOMEN:  soft, nontender, no HSM or masses and bowel sounds normal     MS: extremities normal- no gross deformities noted, no evidence of inflammation in joints, FROM in all extremities.     SKIN: no suspicious lesions or rashes     NEURO: Normal strength and tone, sensory exam grossly normal, mentation intact and speech normal     PSYCH: mentation appears normal. and affect normal/bright    DIAGNOSTICS:                                                    BMP in process    EKG today V1-V2 Q waves, otherwise normal    Recent Labs   Lab Test  05/10/17   1119  06/07/16   1114  02/24/16   1156 04/07/15 03/19/15   HGB   --   13.3   --    --    --    PLT   --   268   --    --    --    INR   --    --    --   0.9   --    NA  136   --   138  140  139   POTASSIUM  4.2   --   4.1  4.4  4.3   CR  1.04   --   0.97  0.91  0.88   A1C   --    --    --    --   5.3     IMPRESSION:                                                    Reason for surgery/procedure: torn ankle ligaments    The proposed surgical procedure is considered INTERMEDIATE risk.    REVISED CARDIAC RISK INDEX  The patient has the following serious cardiovascular risks for perioperative " complications such as (MI, PE, VFib and 3  AV Block):  No serious cardiac risks  INTERPRETATION: 0 risks: Class I (very low risk - 0.4% complication rate)    The patient has the following additional risks for perioperative complications:  No identified additional risks      ICD-10-CM    1. Preop general physical exam Z01.818 EKG 12-lead complete w/read - Clinics   2. Torn ligament T14.8    3. CKD (chronic kidney disease) stage 3, GFR 30-59 ml/min N18.3 Basic metabolic panel  (Ca, Cl, CO2, Creat, Gluc, K, Na, BUN)   4. Mild intermittent asthma without complication J45.20    5. Gastroesophageal reflux disease, esophagitis presence not specified K21.9 H Pylori antigen stool     GASTROENTEROLOGY ADULT REF PROCEDURE ONLY   6. Insomnia, unspecified type G47.00    7. At high risk for osteoporosis Z91.89 DX Hip/Pelvis/Spine   8. Anxiety F41.9    9. Chronic low back pain, unspecified back pain laterality, with sciatica presence unspecified M54.5 gabapentin (NEURONTIN) 300 MG capsule    G89.29    10. High risk medications (not anticoagulants) long-term use Z79.899 DX Hip/Pelvis/Spine   11. Post-menopausal Z78.0 DX Hip/Pelvis/Spine   12. Muscle twitching R25.3 gabapentin (NEURONTIN) 300 MG capsule   13. Need for prophylactic vaccination and inoculation against influenza Z23 Vaccine Administration, Initial [75649]     FLU VAC, SPLIT VIRUS IM > 3 YO (QUADRIVALENT) [33807]       RECOMMENDATIONS:                                                      --Consult hospital rounder / IM to assist post-op medical management    --Patient is to take all scheduled medications on the day of surgery EXCEPT for modifications listed below.      APPROVAL GIVEN to proceed with proposed procedure, without further diagnostic evaluation     Signed Electronically by: Tresa Best PA-C    Copy of this evaluation report is provided to requesting physician.    [facility] Preop Guidelines    Patient Instructions   Stop meloxicam 3 days before  surgery   Can stop advair for the season, but maybe wait until after surgery to make sure asthma won't interfere    Need to do stool test for H pylori  And should absolutely do stomach scope since have needed nexium or similar for so many years  Discussed many long term problems from being on these meds for so long    Try taking 1 extra pill of gabapentin at night for sleep     Before Your Surgery      Call your surgeon if there is any change in your health. This includes signs of a cold or flu (such as a sore throat, runny nose, cough, rash or fever).    Do not smoke, drink alcohol or take over the counter medicine (unless your surgeon or primary care doctor tells you to) for the 24 hours before and after surgery.    If you take prescribed drugs: Follow your doctor s orders about which medicines to take and which to stop until after surgery.    Eating and drinking prior to surgery: follow the instructions from your surgeon    Take a shower or bath the night before surgery. Use the soap your surgeon gave you to gently clean your skin. If you do not have soap from your surgeon, use your regular soap. Do not shave or scrub the surgery site.  Wear clean pajamas and have clean sheets on your bed.

## 2017-09-14 NOTE — PATIENT INSTRUCTIONS
Stop meloxicam 3 days before surgery   Can stop advair for the season, but maybe wait until after surgery to make sure asthma won't interfere    Need to do stool test for H pylori  And should absolutely do stomach scope since have needed nexium or similar for so many years  Discussed many long term problems from being on these meds for so long    Try taking 1 extra pill of gabapentin at night for sleep     Before Your Surgery      Call your surgeon if there is any change in your health. This includes signs of a cold or flu (such as a sore throat, runny nose, cough, rash or fever).    Do not smoke, drink alcohol or take over the counter medicine (unless your surgeon or primary care doctor tells you to) for the 24 hours before and after surgery.    If you take prescribed drugs: Follow your doctor s orders about which medicines to take and which to stop until after surgery.    Eating and drinking prior to surgery: follow the instructions from your surgeon    Take a shower or bath the night before surgery. Use the soap your surgeon gave you to gently clean your skin. If you do not have soap from your surgeon, use your regular soap. Do not shave or scrub the surgery site.  Wear clean pajamas and have clean sheets on your bed.

## 2017-09-14 NOTE — MR AVS SNAPSHOT
After Visit Summary   9/14/2017    Patricia Smith    MRN: [id number]           Patient Information     Date Of Birth          1960        Visit Information        Provider Department      9/14/2017 1:40 PM Tresa Best PA-C [facility]        Today's Diagnoses     Preop general physical exam    -  1    Torn ligament        CKD (chronic kidney disease) stage 3, GFR 30-59 ml/min        Mild intermittent asthma without complication        Anxiety        Chronic low back pain, unspecified back pain laterality, with sciatica presence unspecified        Muscle twitching        Insomnia, unspecified type        Gastroesophageal reflux disease, esophagitis presence not specified        At high risk for osteoporosis        High risk medications (not anticoagulants) long-term use        Post-menopausal          Care Instructions    Stop meloxicam 3 days before surgery   Can stop advair for the season, but maybe wait until after surgery to make sure asthma won't interfere    Need to do stool test for H pylori  And should absolutely do stomach scope since have needed nexium or similar for so many years  Discussed many long term problems from being on these meds for so long    Try taking 1 extra pill of gabapentin at night for sleep     Before Your Surgery      Call your surgeon if there is any change in your health. This includes signs of a cold or flu (such as a sore throat, runny nose, cough, rash or fever).    Do not smoke, drink alcohol or take over the counter medicine (unless your surgeon or primary care doctor tells you to) for the 24 hours before and after surgery.    If you take prescribed drugs: Follow your doctor s orders about which medicines to take and which to stop until after surgery.    Eating and drinking prior to surgery: follow the instructions from your surgeon    Take a shower or bath the night before surgery. Use the soap your surgeon gave you to gently clean  your skin. If you do not have soap from your surgeon, use your regular soap. Do not shave or scrub the surgery site.  Wear clean pajamas and have clean sheets on your bed.           Follow-ups after your visit        Additional Services     GASTROENTEROLOGY ADULT REF PROCEDURE ONLY       Last Lab Result: Creatinine (mg/dL)       Date                     Value                 05/10/2017               1.04             ----------  Body mass index is 31.6 kg/(m^2).     Needed:  No  Language:  English    Patient will be contacted to schedule procedure.     Please be aware that coverage of these services is subject to the terms and limitations of your health insurance plan.  Call member services at your health plan with any benefit or coverage questions.  Any procedures must be performed at a [place] facility OR coordinated by your clinic's referral office.    Please bring the following with you to your appointment:    (1) Any X-Rays, CTs or MRIs which have been performed.  Contact the facility where they were done to arrange for  prior to your scheduled appointment.    (2) List of current medications   (3) This referral request   (4) Any documents/labs given to you for this referral                  Your next 10 appointments already scheduled     Dec 28, 2017 11:00 AM CST   [facility]    [address] [place] [address] [phone]              Future tests that were ordered for you today     Open Future Orders        Priority Expected Expires Ordered    DX Hip/Pelvis/Spine Routine  9/14/2018 9/14/2017    H Pylori antigen stool Routine  10/14/2017 9/14/2017            Who to contact     If you have questions or need follow up information about today's clinic visit or your schedule please contact [facility] directly at [phone].  Normal or non-critical lab and imaging results will be  "communicated to you by What They Likehart, letter or phone within 4 business days after the clinic has received the results. If you do not hear from us within 7 days, please contact the clinic through Pitzi or phone. If you have a critical or abnormal lab result, we will notify you by phone as soon as possible.  Submit refill requests through Pitzi or call your pharmacy and they will forward the refill request to us. Please allow 3 business days for your refill to be completed.          Additional Information About Your Visit        Pitzi Information     Pitzi gives you secure access to your electronic health record. If you see a primary care provider, you can also send messages to your care team and make appointments. If you have questions, please call your primary care clinic.  If you do not have a primary care provider, please call [phone] and they will assist you.        Care EveryWhere ID     This is your Care EveryWhere ID. This could be used by other organizations to access your [place] medical records  [id number]        Your Vitals Were     Pulse Temperature Height Last Period BMI (Body Mass Index)       84 97.6  F (36.4  C) (Tympanic) 5' 3\" (1.6 m) 02/24/1993 31.6 kg/m2        Blood Pressure from Last 3 Encounters:   09/14/17 100/62   06/28/17 118/77   03/16/17 115/71    Weight from Last 3 Encounters:   09/14/17 178 lb 6.4 oz (80.9 kg)   03/22/17 183 lb (83 kg)   03/16/17 183 lb (83 kg)              We Performed the Following     Basic metabolic panel  (Ca, Cl, CO2, Creat, Gluc, K, Na, BUN)     EKG 12-lead complete w/read - Clinics     GASTROENTEROLOGY ADULT REF PROCEDURE ONLY          Today's Medication Changes          These changes are accurate as of: 9/14/17  2:22 PM.  If you have any questions, ask your nurse or doctor.               These medicines have changed or have updated prescriptions.        Dose/Directions    fluticasone-salmeterol 45-21 MCG/ACT inhaler   Commonly known as:  ADVAIR-HFA "   This may have changed:    - when to take this  - reasons to take this   Used for:  Mild intermittent asthma without complication        Dose:  2 puff   Inhale 2 puffs into the lungs 2 times daily   Quantity:  12 g   Refills:  1       gabapentin 300 MG capsule   Commonly known as:  NEURONTIN   Indication:  Take 2 tablets in the AM and 2 tablets in PM   This may have changed:  additional instructions   Used for:  Chronic low back pain, unspecified back pain laterality, with sciatica presence unspecified, Muscle twitching   Changed by:  Tresa Best PA-C        3 caps morning and 4 caps evening.   Quantity:  270 capsule   Refills:  5            Where to get your medicines      These medications were sent to [facility] [address] [address]     Phone:  [phone]     gabapentin 300 MG capsule                Primary Care Provider Office Phone # Fax #    Tresa Best PA-C [phone] [address] [place] [address]        Equal Access to Services     KATTY HINTON AH: Hadii karly ku hadasho Soomaali, waaxda luqadaha, qaybta kaalmada adeegyada, waxay fosterin hayteja bernard . So [facility] [phone].    ATENCIÓN: Si habla español, tiene a dillon disposición servicios gratuitos de asistencia lingüística. Ll[facility] [phone].    We comply with applicable federal civil rights laws and [place] laws. We do not discriminate on the basis of race, color, national origin, age, disability sex, sexual orientation or gender identity.            Thank you!     Thank you for choosing [facility]  for your care. Our goal is always to provide you with excellent care. Hearing back from our patients is one way we can continue to improve our services. Please take a few minutes to complete the written survey that you may receive in the mail after your visit with us. Thank you!             Your  Updated Medication List - Protect others around you: Learn how to safely use, store and throw away your medicines at www.disposemymeds.org.          This list is accurate as of: 9/14/17  2:22 PM.  Always use your most recent med list.                   Brand Name Dispense Instructions for use Diagnosis    albuterol 108 (90 BASE) MCG/ACT Inhaler    PROAIR HFA/PROVENTIL HFA/VENTOLIN HFA    1 Inhaler    Inhale 2 puffs into the lungs every 6 hours    Mild intermittent asthma without complication       baclofen 20 MG tablet    LIORESAL    60 tablet    Take 1 tablet (20 mg) by mouth 2 times daily    Chronic low back pain, unspecified back pain laterality, with sciatica presence unspecified, Muscle twitching       BOTOX IJ      Reported on 3/13/2017        clonazePAM 1 MG tablet    klonoPIN    45 tablet    Take 1-2 tablets (1-2 mg) by mouth nightly as needed for anxiety Trying to reduce amount of clonazepam and see if we can get you off it.  Currently taking 1.5 mg per day.  Refill not more than monthly.    Chronic low back pain, unspecified back pain laterality, with sciatica presence unspecified, Muscle twitching       cyanocobalamin 1000 MCG tablet    vitamin  B-12     Take 1,000 mcg by mouth daily        fexofenadine-pseudoePHEDrine  MG per 12 hr tablet    ALLEGRA-D    60 tablet    Take 1 tablet by mouth 2 times daily    Allergic rhinitis due to pollen, unspecified chronicity, unspecified seasonality       fluticasone-salmeterol 45-21 MCG/ACT inhaler    ADVAIR-HFA    12 g    Inhale 2 puffs into the lungs 2 times daily    Mild intermittent asthma without complication       furosemide 40 MG tablet    LASIX    90 tablet    TAKE ONE TABLET BY MOUTH EVERY DAY    Peripheral edema       gabapentin 300 MG capsule    NEURONTIN    270 capsule    3 caps morning and 4 caps evening.    Chronic low back pain, unspecified back pain laterality, with sciatica presence unspecified, Muscle twitching       meloxicam 15 MG tablet     MOBIC    90 tablet    TAKE ONE TABLET BY MOUTH EVERY DAY    Chronic low back pain, unspecified back pain laterality, with sciatica presence unspecified       MULTI FOR HER 50+ Caps           omeprazole 20 MG CR capsule    priLOSEC    30 capsule    TAKE ONE CAPSULE BY MOUTH EVERY DAY. IF NO BENEFIT IN 1-2 WEEKS, OK TO INCREASE TO TAKE TWO CAPSULES BY MOUTH EVERY DAY    Gastroesophageal reflux disease, esophagitis presence not specified       order for DME     1 Device    Equipment being ordered: ankle, air, stirrup, universal    Sprain of left ankle, unspecified ligament, initial encounter       sertraline 100 MG tablet    ZOLOFT    45 tablet    Take 1 tablet (100 mg) by mouth daily    Anxiety, Major depressive disorder, recurrent episode, mild (H)       Vitamin D-3 1000 UNITS Caps      Take 5,000 Units by mouth daily

## 2017-09-14 NOTE — LETTER
September 19, 2017      Patricia Smith  [address] [place] [address]        Dear ,    We are writing to inform you of your test results.    Your kidney function is stable and blood salts normal.     Resulted Orders   Basic metabolic panel  (Ca, Cl, CO2, Creat, Gluc, K, Na, BUN)   Result Value Ref Range    Sodium 138 133 - 144 mmol/L    Potassium 4.0 3.4 - 5.3 mmol/L    Chloride 100 94 - 109 mmol/L    Carbon Dioxide 31 20 - 32 mmol/L    Anion Gap 7 3 - 14 mmol/L    Glucose 84 70 - 99 mg/dL    Urea Nitrogen 14 7 - 30 mg/dL    Creatinine 1.08 (H) 0.52 - 1.04 mg/dL    GFR Estimate 52 (L) >60 mL/min/1.7m2      Comment:      Non  GFR Calc    GFR Estimate If Black 63 >60 mL/min/1.7m2      Comment:       GFR Calc    Calcium 9.0 8.5 - 10.1 mg/dL       If you have any questions or concerns, please call the clinic at the number listed above.       Sincerely,        Tresa Best PA-C/kayce

## 2017-09-15 LAB
ANION GAP SERPL CALCULATED.3IONS-SCNC: 7 MMOL/L (ref 3–14)
BUN SERPL-MCNC: 14 MG/DL (ref 7–30)
CALCIUM SERPL-MCNC: 9 MG/DL (ref 8.5–10.1)
CHLORIDE SERPL-SCNC: 100 MMOL/L (ref 94–109)
CO2 SERPL-SCNC: 31 MMOL/L (ref 20–32)
CREAT SERPL-MCNC: 1.08 MG/DL (ref 0.52–1.04)
GFR SERPL CREATININE-BSD FRML MDRD: 52 ML/MIN/1.7M2
GLUCOSE SERPL-MCNC: 84 MG/DL (ref 70–99)
POTASSIUM SERPL-SCNC: 4 MMOL/L (ref 3.4–5.3)
SODIUM SERPL-SCNC: 138 MMOL/L (ref 133–144)

## 2017-09-15 ASSESSMENT — ASTHMA QUESTIONNAIRES: ACT_TOTALSCORE: 24

## 2017-09-15 ASSESSMENT — ANXIETY QUESTIONNAIRES: GAD7 TOTAL SCORE: 5

## 2017-09-15 NOTE — PROGRESS NOTES
Donna Gomez,    Your kidney function is stable and blood salts normal.  Cleared for surgery.    Please call clinic at [phone] if you have questions.    Tresa Best PA-C

## 2017-09-27 ENCOUNTER — ANESTHESIA EVENT (OUTPATIENT)
Dept: SURGERY | Facility: CLINIC | Age: 57
DRG: 502 | End: 2017-09-27
Payer: MEDICARE

## 2017-09-27 ASSESSMENT — LIFESTYLE VARIABLES: TOBACCO_USE: 1

## 2017-09-27 NOTE — ANESTHESIA PREPROCEDURE EVALUATION
Anesthesia Evaluation     . Pt has had prior anesthetic. Type: General and Regional           ROS/MED HX    ENT/Pulmonary:     (+)allergic rhinitis, tobacco use, Past use Intermittent asthma , . .    Neurologic: Comment: insomnia - neg neurologic ROS     Cardiovascular:  - neg cardiovascular ROS   (+) ----. : . . . :. . Previous cardiac testing date:results:date: results:ECG reviewed date:9-4-17 results:Sinus Rhythm   -Old anteroseptal infarct.     ABNORMAL date: results:          METS/Exercise Tolerance:     Hematologic:     (+) History of Transfusion -      Musculoskeletal: Comment: Chronic back pain  Cervical and lumbar disc disease  (+) , , other musculoskeletal- left ankle instability/impingement, DJD, hx of pelvic fx      GI/Hepatic: Comment: constipation    (+) GERD Other GI/Hepatic hemorrhoids       Renal/Genitourinary: Comment: CKD stage 3    (+) chronic renal disease,       Endo:     (+) thyroid problem hypothyroidism, .      Psychiatric:     (+) psychiatric history depression and anxiety      Infectious Disease:        (-) Recent Fever   Malignancy:         Other: Comment: Tinea corporis                    Physical Exam  Normal systems: cardiovascular, pulmonary and dental    Airway   Mallampati: II  TM distance: >3 FB    Dental     Cardiovascular       Pulmonary                     Anesthesia Plan      History & Physical Review  History and physical reviewed and following examination; no interval change.    ASA Status:  3 .    NPO Status:  > 8 hours    Plan for General, Periph. Nerve Block for postop pain, ETT and LMA with Intravenous and Propofol induction. Maintenance will be Balanced.    PONV prophylaxis:  Ondansetron (or other 5HT-3) and Dexamethasone or Solumedrol  Additional equipment: Videolaryngoscope      Postoperative Care  Postoperative pain management:  IV analgesics, Peripheral nerve block (Continuous) and Oral pain medications.      Consents  Anesthetic plan, risks, benefits and  alternatives discussed with:  Patient..                          .

## 2017-09-28 ENCOUNTER — ANESTHESIA (OUTPATIENT)
Dept: SURGERY | Facility: CLINIC | Age: 57
DRG: 502 | End: 2017-09-28
Payer: MEDICARE

## 2017-09-28 ENCOUNTER — APPOINTMENT (OUTPATIENT)
Dept: GENERAL RADIOLOGY | Facility: CLINIC | Age: 57
DRG: 502 | End: 2017-09-28
Attending: NURSE ANESTHETIST, CERTIFIED REGISTERED
Payer: MEDICARE

## 2017-09-28 ENCOUNTER — HOSPITAL ENCOUNTER (INPATIENT)
Facility: CLINIC | Age: 57
LOS: 1 days | Discharge: HOME | DRG: 502 | End: 2017-09-29
Attending: PODIATRIST | Admitting: PODIATRIST
Payer: MEDICARE

## 2017-09-28 DIAGNOSIS — Z98.890 S/P ANKLE LIGAMENT REPAIR: Primary | ICD-10-CM

## 2017-09-28 DIAGNOSIS — G89.18 POST-OP PAIN: ICD-10-CM

## 2017-09-28 PROBLEM — R06.9 RESPIRATORY ABNORMALITIES: Status: ACTIVE | Noted: 2017-09-28

## 2017-09-28 PROBLEM — R09.02 HYPOXIA: Status: ACTIVE | Noted: 2017-09-28

## 2017-09-28 LAB
ANION GAP SERPL CALCULATED.3IONS-SCNC: 5 MMOL/L (ref 3–14)
BUN SERPL-MCNC: 11 MG/DL (ref 7–30)
CALCIUM SERPL-MCNC: 8.5 MG/DL (ref 8.5–10.1)
CHLORIDE SERPL-SCNC: 105 MMOL/L (ref 94–109)
CO2 SERPL-SCNC: 31 MMOL/L (ref 20–32)
CREAT SERPL-MCNC: 0.98 MG/DL (ref 0.52–1.04)
ERYTHROCYTE [DISTWIDTH] IN BLOOD BY AUTOMATED COUNT: 12.6 % (ref 10–15)
GFR SERPL CREATININE-BSD FRML MDRD: 58 ML/MIN/1.7M2
GLUCOSE SERPL-MCNC: 120 MG/DL (ref 70–99)
HCT VFR BLD AUTO: 40.7 % (ref 35–47)
HGB BLD-MCNC: 13 G/DL (ref 11.7–15.7)
MCH RBC QN AUTO: 27.6 PG (ref 26.5–33)
MCHC RBC AUTO-ENTMCNC: 31.9 G/DL (ref 31.5–36.5)
MCV RBC AUTO: 86 FL (ref 78–100)
NT-PROBNP SERPL-MCNC: 263 PG/ML (ref 0–900)
PLATELET # BLD AUTO: 218 10E9/L (ref 150–450)
POTASSIUM SERPL-SCNC: 3.8 MMOL/L (ref 3.4–5.3)
RBC # BLD AUTO: 4.71 10E12/L (ref 3.8–5.2)
SODIUM SERPL-SCNC: 141 MMOL/L (ref 133–144)
TROPONIN I SERPL-MCNC: <0.015 UG/L (ref 0–0.04)
WBC # BLD AUTO: 11.6 10E9/L (ref 4–11)

## 2017-09-28 PROCEDURE — 25000125 ZZHC RX 250: Performed by: PODIATRIST

## 2017-09-28 PROCEDURE — 83880 ASSAY OF NATRIURETIC PEPTIDE: CPT | Performed by: FAMILY MEDICINE

## 2017-09-28 PROCEDURE — 71010 XR CHEST PORT 1 VW: CPT

## 2017-09-28 PROCEDURE — 25000128 H RX IP 250 OP 636: Performed by: NURSE ANESTHETIST, CERTIFIED REGISTERED

## 2017-09-28 PROCEDURE — C1713 ANCHOR/SCREW BN/BN,TIS/BN: HCPCS | Performed by: PODIATRIST

## 2017-09-28 PROCEDURE — 25000125 ZZHC RX 250: Performed by: NURSE ANESTHETIST, CERTIFIED REGISTERED

## 2017-09-28 PROCEDURE — 71000027 ZZH RECOVERY PHASE 2 EACH 15 MINS: Performed by: PODIATRIST

## 2017-09-28 PROCEDURE — 37000008 ZZH ANESTHESIA TECHNICAL FEE, 1ST 30 MIN: Performed by: PODIATRIST

## 2017-09-28 PROCEDURE — 85027 COMPLETE CBC AUTOMATED: CPT | Performed by: FAMILY MEDICINE

## 2017-09-28 PROCEDURE — 25000132 ZZH RX MED GY IP 250 OP 250 PS 637: Mod: GY | Performed by: FAMILY MEDICINE

## 2017-09-28 PROCEDURE — 37000009 ZZH ANESTHESIA TECHNICAL FEE, EACH ADDTL 15 MIN: Performed by: PODIATRIST

## 2017-09-28 PROCEDURE — 99232 SBSQ HOSP IP/OBS MODERATE 35: CPT | Performed by: FAMILY MEDICINE

## 2017-09-28 PROCEDURE — S0020 INJECTION, BUPIVICAINE HYDRO: HCPCS | Performed by: PODIATRIST

## 2017-09-28 PROCEDURE — 12000000 ZZH R&B MED SURG/OB

## 2017-09-28 PROCEDURE — 84484 ASSAY OF TROPONIN QUANT: CPT | Performed by: FAMILY MEDICINE

## 2017-09-28 PROCEDURE — 27210794 ZZH OR GENERAL SUPPLY STERILE: Performed by: PODIATRIST

## 2017-09-28 PROCEDURE — 36000093 ZZH SURGERY LEVEL 4 1ST 30 MIN: Performed by: PODIATRIST

## 2017-09-28 PROCEDURE — 36000063 ZZH SURGERY LEVEL 4 EA 15 ADDTL MIN: Performed by: PODIATRIST

## 2017-09-28 PROCEDURE — A9270 NON-COVERED ITEM OR SERVICE: HCPCS | Mod: GY | Performed by: FAMILY MEDICINE

## 2017-09-28 PROCEDURE — S0077 INJECTION, CLINDAMYCIN PHOSP: HCPCS | Performed by: PODIATRIST

## 2017-09-28 PROCEDURE — 36415 COLL VENOUS BLD VENIPUNCTURE: CPT | Performed by: FAMILY MEDICINE

## 2017-09-28 PROCEDURE — 25000566 ZZH SEVOFLURANE, EA 15 MIN: Performed by: PODIATRIST

## 2017-09-28 PROCEDURE — 0LBT0ZZ EXCISION OF LEFT ANKLE TENDON, OPEN APPROACH: ICD-10-PCS | Performed by: PODIATRIST

## 2017-09-28 PROCEDURE — 71000012 ZZH RECOVERY PHASE 1 LEVEL 1 FIRST HR: Performed by: PODIATRIST

## 2017-09-28 PROCEDURE — 80048 BASIC METABOLIC PNL TOTAL CA: CPT | Performed by: FAMILY MEDICINE

## 2017-09-28 PROCEDURE — 71000013 ZZH RECOVERY PHASE 1 LEVEL 1 EA ADDTL HR: Performed by: PODIATRIST

## 2017-09-28 PROCEDURE — 40000305 ZZH STATISTIC PRE PROC ASSESS I: Performed by: PODIATRIST

## 2017-09-28 PROCEDURE — 0MQR0ZZ REPAIR LEFT ANKLE BURSA AND LIGAMENT, OPEN APPROACH: ICD-10-PCS | Performed by: PODIATRIST

## 2017-09-28 DEVICE — IMP KIT REPAIR LIGAMENT AUGMENTATION INT BRACE AR-1688-CP: Type: IMPLANTABLE DEVICE | Site: ANKLE | Status: FUNCTIONAL

## 2017-09-28 DEVICE — IMP ANCHOR ARTHREX BIO-SUTURE TAK 3X14MM W/FW AR-8934BCNF: Type: IMPLANTABLE DEVICE | Site: ANKLE | Status: FUNCTIONAL

## 2017-09-28 RX ORDER — ROPIVACAINE HYDROCHLORIDE 5 MG/ML
INJECTION, SOLUTION EPIDURAL; INFILTRATION; PERINEURAL PRN
Status: DISCONTINUED | OUTPATIENT
Start: 2017-09-28 | End: 2017-09-28

## 2017-09-28 RX ORDER — LIDOCAINE HYDROCHLORIDE 10 MG/ML
INJECTION, SOLUTION EPIDURAL; INFILTRATION; INTRACAUDAL; PERINEURAL PRN
Status: DISCONTINUED | OUTPATIENT
Start: 2017-09-28 | End: 2017-09-28

## 2017-09-28 RX ORDER — FENTANYL CITRATE 50 UG/ML
25-50 INJECTION, SOLUTION INTRAMUSCULAR; INTRAVENOUS
Status: DISCONTINUED | OUTPATIENT
Start: 2017-09-28 | End: 2017-09-28 | Stop reason: HOSPADM

## 2017-09-28 RX ORDER — CLONAZEPAM 1 MG/1
1-2 TABLET ORAL
Status: DISCONTINUED | OUTPATIENT
Start: 2017-09-28 | End: 2017-09-29 | Stop reason: HOSPADM

## 2017-09-28 RX ORDER — CLINDAMYCIN PHOSPHATE 900 MG/50ML
900 INJECTION, SOLUTION INTRAVENOUS SEE ADMIN INSTRUCTIONS
Status: DISCONTINUED | OUTPATIENT
Start: 2017-09-28 | End: 2017-09-28 | Stop reason: HOSPADM

## 2017-09-28 RX ORDER — LIDOCAINE 40 MG/G
CREAM TOPICAL
Status: DISCONTINUED | OUTPATIENT
Start: 2017-09-28 | End: 2017-09-28 | Stop reason: HOSPADM

## 2017-09-28 RX ORDER — LIDOCAINE HCL/EPINEPHRINE/PF 2%-1:200K
VIAL (ML) INJECTION PRN
Status: DISCONTINUED | OUTPATIENT
Start: 2017-09-28 | End: 2017-09-28

## 2017-09-28 RX ORDER — METOCLOPRAMIDE HYDROCHLORIDE 5 MG/ML
10 INJECTION INTRAMUSCULAR; INTRAVENOUS EVERY 6 HOURS PRN
Status: DISCONTINUED | OUTPATIENT
Start: 2017-09-28 | End: 2017-09-28 | Stop reason: HOSPADM

## 2017-09-28 RX ORDER — GABAPENTIN 300 MG/1
900 CAPSULE ORAL
Status: DISCONTINUED | OUTPATIENT
Start: 2017-09-29 | End: 2017-09-29 | Stop reason: HOSPADM

## 2017-09-28 RX ORDER — HYDROMORPHONE HYDROCHLORIDE 1 MG/ML
.3-.5 INJECTION, SOLUTION INTRAMUSCULAR; INTRAVENOUS; SUBCUTANEOUS
Status: DISCONTINUED | OUTPATIENT
Start: 2017-09-28 | End: 2017-09-29 | Stop reason: HOSPADM

## 2017-09-28 RX ORDER — GABAPENTIN 400 MG/1
1200 CAPSULE ORAL 3 TIMES DAILY
Status: DISCONTINUED | OUTPATIENT
Start: 2017-09-29 | End: 2017-09-29

## 2017-09-28 RX ORDER — NALOXONE HYDROCHLORIDE 0.4 MG/ML
.1-.4 INJECTION, SOLUTION INTRAMUSCULAR; INTRAVENOUS; SUBCUTANEOUS
Status: DISCONTINUED | OUTPATIENT
Start: 2017-09-28 | End: 2017-09-28 | Stop reason: HOSPADM

## 2017-09-28 RX ORDER — PSEUDOEPHEDRINE HCL 120 MG/1
120 TABLET, FILM COATED, EXTENDED RELEASE ORAL 2 TIMES DAILY
Status: DISCONTINUED | OUTPATIENT
Start: 2017-09-28 | End: 2017-09-29 | Stop reason: HOSPADM

## 2017-09-28 RX ORDER — MEPERIDINE HYDROCHLORIDE 25 MG/ML
12.5 INJECTION INTRAMUSCULAR; INTRAVENOUS; SUBCUTANEOUS
Status: DISCONTINUED | OUTPATIENT
Start: 2017-09-28 | End: 2017-09-28 | Stop reason: HOSPADM

## 2017-09-28 RX ORDER — ALBUTEROL SULFATE 0.83 MG/ML
2.5 SOLUTION RESPIRATORY (INHALATION) EVERY 4 HOURS PRN
Status: DISCONTINUED | OUTPATIENT
Start: 2017-09-28 | End: 2017-09-28 | Stop reason: HOSPADM

## 2017-09-28 RX ORDER — SODIUM CHLORIDE, SODIUM LACTATE, POTASSIUM CHLORIDE, CALCIUM CHLORIDE 600; 310; 30; 20 MG/100ML; MG/100ML; MG/100ML; MG/100ML
INJECTION, SOLUTION INTRAVENOUS CONTINUOUS
Status: DISCONTINUED | OUTPATIENT
Start: 2017-09-28 | End: 2017-09-28 | Stop reason: HOSPADM

## 2017-09-28 RX ORDER — METOCLOPRAMIDE 10 MG/1
10 TABLET ORAL EVERY 6 HOURS PRN
Status: DISCONTINUED | OUTPATIENT
Start: 2017-09-28 | End: 2017-09-28 | Stop reason: HOSPADM

## 2017-09-28 RX ORDER — ONDANSETRON 2 MG/ML
4 INJECTION INTRAMUSCULAR; INTRAVENOUS EVERY 30 MIN PRN
Status: DISCONTINUED | OUTPATIENT
Start: 2017-09-28 | End: 2017-09-28 | Stop reason: HOSPADM

## 2017-09-28 RX ORDER — OXYCODONE AND ACETAMINOPHEN 5; 325 MG/1; MG/1
1-2 TABLET ORAL
Status: DISCONTINUED | OUTPATIENT
Start: 2017-09-28 | End: 2017-09-28

## 2017-09-28 RX ORDER — FEXOFENADINE HCL 60 MG/1
60 TABLET, FILM COATED ORAL 2 TIMES DAILY
Status: DISCONTINUED | OUTPATIENT
Start: 2017-09-28 | End: 2017-09-29 | Stop reason: HOSPADM

## 2017-09-28 RX ORDER — LIDOCAINE HYDROCHLORIDE 20 MG/ML
INJECTION, SOLUTION INFILTRATION; PERINEURAL PRN
Status: DISCONTINUED | OUTPATIENT
Start: 2017-09-28 | End: 2017-09-28 | Stop reason: HOSPADM

## 2017-09-28 RX ORDER — ONDANSETRON 2 MG/ML
INJECTION INTRAMUSCULAR; INTRAVENOUS PRN
Status: DISCONTINUED | OUTPATIENT
Start: 2017-09-28 | End: 2017-09-28

## 2017-09-28 RX ORDER — HYDROXYZINE HYDROCHLORIDE 25 MG/1
25 TABLET, FILM COATED ORAL EVERY 6 HOURS PRN
Qty: 38 TABLET | Refills: 0 | Status: SHIPPED | OUTPATIENT
Start: 2017-09-28 | End: 2017-11-02

## 2017-09-28 RX ORDER — OXYCODONE AND ACETAMINOPHEN 5; 325 MG/1; MG/1
1-2 TABLET ORAL EVERY 4 HOURS PRN
Status: DISCONTINUED | OUTPATIENT
Start: 2017-09-28 | End: 2017-09-29 | Stop reason: HOSPADM

## 2017-09-28 RX ORDER — OXYCODONE AND ACETAMINOPHEN 5; 325 MG/1; MG/1
1-2 TABLET ORAL EVERY 4 HOURS PRN
Qty: 38 TABLET | Refills: 0 | Status: SHIPPED | OUTPATIENT
Start: 2017-09-28 | End: 2017-11-02

## 2017-09-28 RX ORDER — FENTANYL CITRATE 50 UG/ML
INJECTION, SOLUTION INTRAMUSCULAR; INTRAVENOUS PRN
Status: DISCONTINUED | OUTPATIENT
Start: 2017-09-28 | End: 2017-09-28

## 2017-09-28 RX ORDER — CLINDAMYCIN PHOSPHATE 900 MG/50ML
900 INJECTION, SOLUTION INTRAVENOUS
Status: COMPLETED | OUTPATIENT
Start: 2017-09-28 | End: 2017-09-28

## 2017-09-28 RX ORDER — GLYCOPYRROLATE 0.2 MG/ML
INJECTION, SOLUTION INTRAMUSCULAR; INTRAVENOUS PRN
Status: DISCONTINUED | OUTPATIENT
Start: 2017-09-28 | End: 2017-09-28

## 2017-09-28 RX ORDER — KETOROLAC TROMETHAMINE 30 MG/ML
30 INJECTION, SOLUTION INTRAMUSCULAR; INTRAVENOUS EVERY 6 HOURS PRN
Status: DISCONTINUED | OUTPATIENT
Start: 2017-09-28 | End: 2017-09-28 | Stop reason: HOSPADM

## 2017-09-28 RX ORDER — FUROSEMIDE 10 MG/ML
40 INJECTION INTRAMUSCULAR; INTRAVENOUS EVERY 6 HOURS
Status: COMPLETED | OUTPATIENT
Start: 2017-09-28 | End: 2017-09-29

## 2017-09-28 RX ORDER — ALBUTEROL SULFATE 90 UG/1
2 AEROSOL, METERED RESPIRATORY (INHALATION) EVERY 6 HOURS
Status: DISCONTINUED | OUTPATIENT
Start: 2017-09-28 | End: 2017-09-29 | Stop reason: HOSPADM

## 2017-09-28 RX ORDER — BUPIVACAINE HYDROCHLORIDE 5 MG/ML
INJECTION, SOLUTION PERINEURAL PRN
Status: DISCONTINUED | OUTPATIENT
Start: 2017-09-28 | End: 2017-09-28 | Stop reason: HOSPADM

## 2017-09-28 RX ORDER — FEXOFENADINE HCL AND PSEUDOEPHEDRINE HCI 60; 120 MG/1; MG/1
1 TABLET, EXTENDED RELEASE ORAL 2 TIMES DAILY
Status: DISCONTINUED | OUTPATIENT
Start: 2017-09-28 | End: 2017-09-28 | Stop reason: CLARIF

## 2017-09-28 RX ORDER — BACLOFEN 10 MG/1
20 TABLET ORAL 2 TIMES DAILY
Status: DISCONTINUED | OUTPATIENT
Start: 2017-09-28 | End: 2017-09-29 | Stop reason: HOSPADM

## 2017-09-28 RX ORDER — NALOXONE HYDROCHLORIDE 0.4 MG/ML
.1-.4 INJECTION, SOLUTION INTRAMUSCULAR; INTRAVENOUS; SUBCUTANEOUS
Status: DISCONTINUED | OUTPATIENT
Start: 2017-09-28 | End: 2017-09-29 | Stop reason: HOSPADM

## 2017-09-28 RX ORDER — HYDROMORPHONE HYDROCHLORIDE 1 MG/ML
.3-.5 INJECTION, SOLUTION INTRAMUSCULAR; INTRAVENOUS; SUBCUTANEOUS EVERY 10 MIN PRN
Status: DISCONTINUED | OUTPATIENT
Start: 2017-09-28 | End: 2017-09-28 | Stop reason: HOSPADM

## 2017-09-28 RX ORDER — DEXAMETHASONE SODIUM PHOSPHATE 4 MG/ML
INJECTION, SOLUTION INTRA-ARTICULAR; INTRALESIONAL; INTRAMUSCULAR; INTRAVENOUS; SOFT TISSUE PRN
Status: DISCONTINUED | OUTPATIENT
Start: 2017-09-28 | End: 2017-09-28

## 2017-09-28 RX ORDER — ONDANSETRON 4 MG/1
4 TABLET, ORALLY DISINTEGRATING ORAL EVERY 30 MIN PRN
Status: DISCONTINUED | OUTPATIENT
Start: 2017-09-28 | End: 2017-09-28 | Stop reason: HOSPADM

## 2017-09-28 RX ADMIN — FENTANYL CITRATE 150 MCG: 50 INJECTION, SOLUTION INTRAMUSCULAR; INTRAVENOUS at 17:14

## 2017-09-28 RX ADMIN — DEXAMETHASONE SODIUM PHOSPHATE 4 MG: 4 INJECTION, SOLUTION INTRA-ARTICULAR; INTRALESIONAL; INTRAMUSCULAR; INTRAVENOUS; SOFT TISSUE at 17:20

## 2017-09-28 RX ADMIN — LIDOCAINE HYDROCHLORIDE 3 ML: 10 INJECTION, SOLUTION EPIDURAL; INFILTRATION; INTRACAUDAL; PERINEURAL at 14:19

## 2017-09-28 RX ADMIN — MIDAZOLAM HYDROCHLORIDE 3 MG: 1 INJECTION, SOLUTION INTRAMUSCULAR; INTRAVENOUS at 14:18

## 2017-09-28 RX ADMIN — ONDANSETRON 4 MG: 2 INJECTION INTRAMUSCULAR; INTRAVENOUS at 17:20

## 2017-09-28 RX ADMIN — MIDAZOLAM HYDROCHLORIDE 2 MG: 1 INJECTION, SOLUTION INTRAMUSCULAR; INTRAVENOUS at 14:38

## 2017-09-28 RX ADMIN — MIDAZOLAM HYDROCHLORIDE 3 MG: 1 INJECTION, SOLUTION INTRAMUSCULAR; INTRAVENOUS at 14:42

## 2017-09-28 RX ADMIN — LIDOCAINE HYDROCHLORIDE 1 ML: 10 INJECTION, SOLUTION EPIDURAL; INFILTRATION; INTRACAUDAL; PERINEURAL at 13:35

## 2017-09-28 RX ADMIN — FENTANYL CITRATE 100 MCG: 50 INJECTION, SOLUTION INTRAMUSCULAR; INTRAVENOUS at 14:18

## 2017-09-28 RX ADMIN — ROPIVACAINE HYDROCHLORIDE 30 ML: 5 INJECTION, SOLUTION EPIDURAL; INFILTRATION; PERINEURAL at 14:45

## 2017-09-28 RX ADMIN — ROPIVACAINE HYDROCHLORIDE 10 ML: 5 INJECTION, SOLUTION EPIDURAL; INFILTRATION; PERINEURAL at 14:47

## 2017-09-28 RX ADMIN — MIDAZOLAM HYDROCHLORIDE 2 MG: 1 INJECTION, SOLUTION INTRAMUSCULAR; INTRAVENOUS at 14:21

## 2017-09-28 RX ADMIN — SODIUM CHLORIDE, POTASSIUM CHLORIDE, SODIUM LACTATE AND CALCIUM CHLORIDE: 600; 310; 30; 20 INJECTION, SOLUTION INTRAVENOUS at 16:52

## 2017-09-28 RX ADMIN — FENTANYL CITRATE 100 MCG: 50 INJECTION, SOLUTION INTRAMUSCULAR; INTRAVENOUS at 14:21

## 2017-09-28 RX ADMIN — GLYCOPYRROLATE 1 MG: 0.2 INJECTION, SOLUTION INTRAMUSCULAR; INTRAVENOUS at 18:02

## 2017-09-28 RX ADMIN — LIDOCAINE HYDROCHLORIDE,EPINEPHRINE BITARTRATE 5 ML: 20; .005 INJECTION, SOLUTION EPIDURAL; INFILTRATION; INTRACAUDAL; PERINEURAL at 14:45

## 2017-09-28 RX ADMIN — CLINDAMYCIN PHOSPHATE 900 MG: 18 INJECTION, SOLUTION INTRAVENOUS at 17:06

## 2017-09-28 RX ADMIN — MIDAZOLAM HYDROCHLORIDE 2 MG: 1 INJECTION, SOLUTION INTRAMUSCULAR; INTRAVENOUS at 17:10

## 2017-09-28 RX ADMIN — FENTANYL CITRATE 100 MCG: 50 INJECTION, SOLUTION INTRAMUSCULAR; INTRAVENOUS at 17:10

## 2017-09-28 RX ADMIN — OXYCODONE HYDROCHLORIDE AND ACETAMINOPHEN 1 TABLET: 5; 325 TABLET ORAL at 23:05

## 2017-09-28 NOTE — BRIEF OP NOTE
[facility] OR   Brief Operative Note    Pre-operative diagnosis: Left ankle instability, impingement   Post-operative diagnosis * No post-op diagnosis entered *   Procedure: Procedure(s):  Left ankle arthroscopic debridement, medial and lateral ligament repair - Wound Class: I-Clean   Surgeon: Pedro Mckeon DPM   Anesthesia: Combined General with Popliteal Block    Estimated blood loss: Less than 10 ml   Blood transfusion: No transfusion was given during surgery   Drains: None   Specimens: None   Findings: Left ankle acute and chronic synovitis conditions.  Cartilage intact.  + medial and lateral ankle ligamentous instability conditions.  Mild peroneal tendon tenosynovitis condition.   Complications: None   Condition: Stable   Comments: See dictated operative report for full details.           Pedro Mckeon DPM, FACFAS  Foot & Ankle Surgeon/Specialist  [facility]

## 2017-09-28 NOTE — IP AVS SNAPSHOT
[facility]    [address] [address]    Phone:  [phone]    Fax:  [phone]                                       After Visit Summary   9/28/2017    Patricia Smith    MRN: [id number]           After Visit Summary Signature Page     I have received my discharge instructions, and my questions have been answered. I have discussed any challenges I see with this plan with the nurse or doctor.    ..........................................................................................................................................  Patient/Patient Representative Signature      ..........................................................................................................................................  Patient Representative Print Name and Relationship to Patient    ..................................................               ................................................  Date                                            Time    ..........................................................................................................................................  Reviewed by Signature/Title    ...................................................              ..............................................  Date                                                            Time

## 2017-09-28 NOTE — IP AVS SNAPSHOT
MRN:[id number]                      After Visit Summary   9/28/2017    Patricia Smith    MRN: [id number]           Thank you!     Thank you for choosing [facility] for your care. Our goal is always to provide you with excellent care. Hearing back from our patients is one way we can continue to improve our services. Please take a few minutes to complete the written survey that you may receive in the mail after you visit with us. Thank you!        Patient Information     Date Of Birth          1960        About your hospital stay     You were admitted on:  September 28, 2017 You last received care in the:  [facility]    You were discharged on:  September 29, 2017        Reason for your hospital stay       Left ankle arthroscopic surgery with ligament repair                  Who to Call     For medical emergencies, please call 911.  For non-urgent questions about your medical care, please call your primary care provider or clinic, [phone]  For questions related to your surgery, please call your surgery clinic        Attending Provider     Provider Helder Griffith MD [facility]    Linda, Pedro Matos DPM Podiatry       Primary Care Provider Office Phone # Fax #    Tresa Best PA-C [phone]       When to contact your care team       Call your Orthopedic surgeon at [facility]  if you have any of the following: temperature greater than 100.4,  increased shortness of breath, increased drainage, increased swelling or increased pain.                  After Care Instructions     Activity       Your activity upon discharge: Activity as tolerated, no driving until off narcotic pain medication.  Keep splint clean and dry.   Non-weight bearing to the left ankle            Diet       Follow this diet upon discharge: Orders Placed This Encounter      Advance Diet as Tolerated: Regular Diet Adult              Discharge  Instructions       Review discharge instructions as directed by Provider.            Discharge Instructions       Patient to be seen in next 10-14 days.    Please call [facility] at [phone] to make/confirm appointment.            Elevate affected extremity           Ice to affected area       Ice pack to affected area PRN (as needed).            No dressing change       until follow up clinic appointment.            No driving or operating machinery       until the day after procedure            Weight bearing status - Toe touch           Wound care and dressings       Instructions to care for your wound at home: keep splint clean and dry. Cover for showering                  Follow-up Appointments     Follow-up and recommended labs and tests       Follow up with  Dr. Mckeon, at (location with clinic name or city) [facility], within 2 weeks to evaluate after surgery and for hospital follow- up.                  Your next 10 appointments already scheduled     Dec 28, 2017 11:00 AM CST   [facility]    [address] [address]   [phone]              Further instructions from your care team                           Same Day Surgery Discharge Instructions  Special Precautions After Surgery - Adult    1. It is not unusual to feel lightheaded or faint, up to 24 hours after surgery or while taking pain medication.  If you have these symptoms; sit for a few minutes before standing and have someone assist you when getting up.  2. You should rest and relax for the next 24 hours and must have someone stay with you for at least 24 hours after your discharge.  3. DO NOT DRIVE any vehicle or operate mechanical equipment for 24 hours following the end of your surgery.  DO NOT DRIVE while taking narcotic pain medications that have been prescribed by your physician.  If you had a limb operated on,  you must be able to use it fully to drive.  4. DO NOT drink alcoholic beverages for 24 hours following surgery or while taking prescription pain medication.  5. Drink clear liquids (apple juice, ginger ale, broth, 7-Up, etc.).  Progress to your regular diet as you feel able.  6. Any questions call your physician and do not make important decisions for 24 hours.    ACTIVITY  ? Off your feet as much as possible for a day or two with foot elevated to heart level or above, then up as tolerated.     INCISIONAL CARE  ? Cover foot with a plastic bag to keep dry to shower.     Call for an appointment to return to the clinic.    Medications:  ? Start a pain pill as soon as you start to feel any pain or at bedtime tonight at the latest, then set an alarm thru the 1st night for every 4 hours to check on patient & give a pain pill.  May take them as needed tomorrow.     Additional discharge instructions: Cold pack behind knee to cool blood going to the foot to decrease swelling & pain.    Break through Bleeding  Get off your feet & get foot elevated way above heart to stop bleeding. Apply cold pack to area.    Post Op Infection  Be alert for signs of infection: redness, swelling, heat, drainage of pus, and/or elevated temperature.  Contact your surgeon if these occur.    Nausea   If post op nausea occurs, at first rest your stomach for a few hours by eating nothing solid and sipping only clear liquids.  Call your Surgeon if nausea does not resolve in 24 hours.  __________________________________________________________________________________________________________________________________  IMPORTANT NUMBERS:    [facility] Main Number:  [phone], [phone]  Pharmacy:  [phone]  Same Day Surgery:  [phone], Monday - Friday until 8:30 p.m.  Urgent Care:  [phone]  Emergency Room:  [phone]      [facility]:  [phone]                                                                             [place]  "[facility] and Orth[facility]:  [phone] option 1  [facility]:  [phone]     OB Clinic:  [phone]   Surgery Specialty Clinic:  [phone]   Home Medical Equipment: [phone]  [facility]:  [phone]        Pending Results     Date and Time Order Name Status Description    9/28/2017 2128 EKG 12-lead In process             Statement of Approval     Ordered          09/29/17 1223  I have reviewed and agree with all the recommendations and orders detailed in this document.  EFFECTIVE NOW     Comments:  Patient may discharge once Echo is completed and is benign.   Approved and electronically signed by:  Simran Mckinney PA-C             Admission Information     Date & Time Provider Department Dept. Phone    9/28/2017 Pedro Mckeon DPM [facility] [phone]      Your Vitals Were     Blood Pressure Pulse Temperature Respirations Height Weight    114/69 73 97.6  F (36.4  C) (Oral) 18 1.549 m (5' 1\") 84 kg (185 lb 3 oz)    Last Period Pulse Oximetry BMI (Body Mass Index)             02/24/1993 99% 34.99 kg/m2         [employer] Information     [employer] gives you secure access to your electronic health record. If you see a primary care provider, you can also send messages to your care team and make appointments. If you have questions, please call your primary care clinic.  If you do not have a primary care provider, please call [phone] and they will assist you.        Care EveryWhere ID     This is your Care EveryWhere ID. This could be used by other organizations to access your [facility] medical records  [id number]        Equal Access to Services     REGULO HINTON : Hadkelsey Davis, waaxda luqadaha, qaybta kaalgay fernández. So [facility] [phone].    ATENCIÓN: Si habla español, tiene a dillon disposición servicios gratuitos de asistencia lingüística. Llame al [phone].    We comply with applicable " federal civil rights laws and [place] laws. We do not discriminate on the basis of race, color, national origin, age, disability, sex, sexual orientation, or gender identity.               Review of your medicines      START taking        Dose / Directions    aspirin  MG EC tablet   Used for:  Post-op pain        Dose:  325 mg   Take 1 tablet (325 mg) by mouth daily   Quantity:  20 tablet   Refills:  0       hydrOXYzine 25 MG tablet   Commonly known as:  ATARAX   Used for:  Post-op pain        Dose:  25 mg   Take 1 tablet (25 mg) by mouth every 6 hours as needed for itching (and nausea)   Quantity:  38 tablet   Refills:  0       oxyCODONE-acetaminophen 5-325 MG per tablet   Commonly known as:  PERCOCET   Used for:  Post-op pain        Dose:  1-2 tablet   Take 1-2 tablets by mouth every 4 hours as needed for pain (moderate to severe)   Quantity:  38 tablet   Refills:  0         CONTINUE these medicines which may have CHANGED, or have new prescriptions. If we are uncertain of the size of tablets/capsules you have at home, strength may be listed as something that might have changed.        Dose / Directions    fluticasone-salmeterol 45-21 MCG/ACT inhaler   Commonly known as:  ADVAIR-HFA   This may have changed:    - when to take this  - reasons to take this   Used for:  Mild intermittent asthma without complication        Dose:  2 puff   Inhale 2 puffs into the lungs 2 times daily   Quantity:  12 g   Refills:  1       * order for DME   This may have changed:  Another medication with the same name was added. Make sure you understand how and when to take each.   Used for:  Sprain of left ankle, unspecified ligament, initial encounter        Equipment being ordered: ankle, air, stirrup, universal   Quantity:  1 Device   Refills:  0       * order for DME   This may have changed:  You were already taking a medication with the same name, and this prescription was added. Make sure you understand how and when to take  each.   Used for:  S/P ankle ligament repair        Equipment being ordered: Other: Roll About scooter Treatment Diagnosis: L ankle surgery   Quantity:  1 Units   Refills:  0       * Notice:  This list has 2 medication(s) that are the same as other medications prescribed for you. Read the directions carefully, and ask your doctor or other care provider to review them with you.      CONTINUE these medicines which have NOT CHANGED        Dose / Directions    albuterol 108 (90 BASE) MCG/ACT Inhaler   Commonly known as:  PROAIR HFA/PROVENTIL HFA/VENTOLIN HFA   Used for:  Mild intermittent asthma without complication        Dose:  2 puff   Inhale 2 puffs into the lungs every 6 hours   Quantity:  1 Inhaler   Refills:  3       baclofen 20 MG tablet   Commonly known as:  LIORESAL   Indication:  Take 1 tablet in AM and 2 at Bedtime   Used for:  Chronic low back pain, unspecified back pain laterality, with sciatica presence unspecified, Muscle twitching        Dose:  20 mg   Take 1 tablet (20 mg) by mouth 2 times daily   Quantity:  60 tablet   Refills:  3       clonazePAM 1 MG tablet   Commonly known as:  klonoPIN   Used for:  Chronic low back pain, unspecified back pain laterality, with sciatica presence unspecified, Muscle twitching        Dose:  1-2 mg   Take 1-2 tablets (1-2 mg) by mouth nightly as needed for anxiety Trying to reduce amount of clonazepam and see if we can get you off it.  Currently taking 1.5 mg per day.  Refill not more than monthly.   Quantity:  45 tablet   Refills:  1       cyanocobalamin 1000 MCG tablet   Commonly known as:  vitamin  B-12        Dose:  1000 mcg   Take 1,000 mcg by mouth daily   Refills:  0       fexofenadine-pseudoePHEDrine  MG per 12 hr tablet   Commonly known as:  ALLEGRA-D   Used for:  Allergic rhinitis due to pollen, unspecified chronicity, unspecified seasonality        Dose:  1 tablet   Take 1 tablet by mouth 2 times daily   Quantity:  60 tablet   Refills:  11        furosemide 40 MG tablet   Commonly known as:  LASIX   Used for:  Peripheral edema        TAKE ONE TABLET BY MOUTH EVERY DAY   Quantity:  90 tablet   Refills:  3       gabapentin 300 MG capsule   Commonly known as:  NEURONTIN   Indication:  Take 2 tablets in the AM and 2 tablets in PM   Used for:  Chronic low back pain, unspecified back pain laterality, with sciatica presence unspecified, Muscle twitching        3 caps morning and 4 caps evening.   Quantity:  270 capsule   Refills:  5       meloxicam 15 MG tablet   Commonly known as:  MOBIC   Used for:  Chronic low back pain, unspecified back pain laterality, with sciatica presence unspecified        TAKE ONE TABLET BY MOUTH EVERY DAY   Quantity:  90 tablet   Refills:  0       MULTI FOR HER 50+ Caps        Refills:  0       omeprazole 20 MG CR capsule   Commonly known as:  priLOSEC   Used for:  Gastroesophageal reflux disease, esophagitis presence not specified        TAKE ONE CAPSULE BY MOUTH EVERY DAY. IF NO BENEFIT IN 1-2 WEEKS, OK TO INCREASE TO TAKE TWO CAPSULES BY MOUTH EVERY DAY   Quantity:  30 capsule   Refills:  1       sertraline 100 MG tablet   Commonly known as:  ZOLOFT   Used for:  Anxiety, Major depressive disorder, recurrent episode, mild (H)        Dose:  100 mg   Take 1 tablet (100 mg) by mouth daily   Quantity:  45 tablet   Refills:  5       Vitamin D-3 1000 UNITS Caps        Dose:  5000 Units   Take 5,000 Units by mouth daily   Refills:  0            Where to get your medicines      Some of these will need a paper prescription and others can be bought over the counter. Ask your nurse if you have questions.     Bring a paper prescription for each of these medications     aspirin  MG EC tablet    hydrOXYzine 25 MG tablet    order for DME    oxyCODONE-acetaminophen 5-325 MG per tablet                Protect others around you: Learn how to safely use, store and throw away your medicines at www.disposemymeds.org.             Medication List: This  is a list of all your medications and when to take them. Check marks below indicate your daily home schedule. Keep this list as a reference.      Medications           Morning Afternoon Evening Bedtime As Needed    albuterol 108 (90 BASE) MCG/ACT Inhaler   Commonly known as:  PROAIR HFA/PROVENTIL HFA/VENTOLIN HFA   Inhale 2 puffs into the lungs every 6 hours   Last time this was given:  2 puffs on 9/29/2017  6:08 PM                                            aspirin  MG EC tablet   Take 1 tablet (325 mg) by mouth daily                                   baclofen 20 MG tablet   Commonly known as:  LIORESAL   Take 1 tablet (20 mg) by mouth 2 times daily   Last time this was given:  20 mg on 9/29/2017  8:03 AM                                      clonazePAM 1 MG tablet   Commonly known as:  klonoPIN   Take 1-2 tablets (1-2 mg) by mouth nightly as needed for anxiety Trying to reduce amount of clonazepam and see if we can get you off it.  Currently taking 1.5 mg per day.  Refill not more than monthly.   Last time this was given:  1 mg on 9/29/2017 12:17 AM                                   cyanocobalamin 1000 MCG tablet   Commonly known as:  vitamin  B-12   Take 1,000 mcg by mouth daily                                   fexofenadine-pseudoePHEDrine  MG per 12 hr tablet   Commonly known as:  ALLEGRA-D   Take 1 tablet by mouth 2 times daily                                      fluticasone-salmeterol 45-21 MCG/ACT inhaler   Commonly known as:  ADVAIR-HFA   Inhale 2 puffs into the lungs 2 times daily                                      furosemide 40 MG tablet   Commonly known as:  LASIX   TAKE ONE TABLET BY MOUTH EVERY DAY   Last time this was given:  40 mg on 9/29/2017  8:02 AM                                   gabapentin 300 MG capsule   Commonly known as:  NEURONTIN   3 caps morning and 4 caps evening.   Last time this was given:  900 mg on 9/29/2017  8:06 AM                                      hydrOXYzine 25  MG tablet   Commonly known as:  ATARAX   Take 1 tablet (25 mg) by mouth every 6 hours as needed for itching (and nausea)                                   meloxicam 15 MG tablet   Commonly known as:  MOBIC   TAKE ONE TABLET BY MOUTH EVERY DAY   Next Dose Due:  None given at hospital                                MULTI FOR HER 50+ Caps                                   omeprazole 20 MG CR capsule   Commonly known as:  priLOSEC   TAKE ONE CAPSULE BY MOUTH EVERY DAY. IF NO BENEFIT IN 1-2 WEEKS, OK TO INCREASE TO TAKE TWO CAPSULES BY MOUTH EVERY DAY   Last time this was given:  20 mg on 9/29/2017  8:02 AM                                   * order for DME   Equipment being ordered: ankle, air, stirrup, universal                                * order for DME   Equipment being ordered: Other: Roll About scooter Treatment Diagnosis: L ankle surgery                                oxyCODONE-acetaminophen 5-325 MG per tablet   Commonly known as:  PERCOCET   Take 1-2 tablets by mouth every 4 hours as needed for pain (moderate to severe)   Last time this was given:  2 tablets on 9/29/2017  2:27 PM                                   sertraline 100 MG tablet   Commonly known as:  ZOLOFT   Take 1 tablet (100 mg) by mouth daily                                   Vitamin D-3 1000 UNITS Caps   Take 5,000 Units by mouth daily                                   * Notice:  This list has 2 medication(s) that are the same as other medications prescribed for you. Read the directions carefully, and ask your doctor or other care provider to review them with you.

## 2017-09-28 NOTE — ANESTHESIA PROCEDURE NOTES
Peripheral nerve/Neuraxial procedure note : sciatic, saphenous and popliteal  Pre-Procedure  Performed by  SHUKRI DE LA CRUZ   Location: pre-op    Procedure Times:9/28/2017 2:18 PM and 9/28/2017 2:48 PM  Pre-Anesthestic Checklist: patient identified, IV checked, site marked, risks and benefits discussed, informed consent, monitors and equipment checked, pre-op evaluation, at physician/surgeon's request and post-op pain management    Timeout  Correct Patient: Yes   Correct Procedure: Yes   Correct Site: Yes   Correct Laterality: Yes   Correct Position: Yes   Site Marked: Yes   .   Procedure Documentation    .    Procedure:    Sciatic, saphenous and popliteal.  Local skin infiltrated with mL of 1% lidocaine.     Ultrasound used to identify targeted nerve, plexus, or vascular marker and placed a needle adjacent to it., Ultrasound was used to visualize the spread of the anesthetic in close proximity to the above stated nerve. A permanent image is entered into the patient's record.  Patient Prep;mask, sterile gloves, chlorhexidine gluconate and isopropyl alcohol, patient draped.  Nerve Stim: Initial Level 1 mA.  Lowest motor response 0.48 mA..  Needle: insulated, short bevel Needle Gauge: 20.    Needle Length (Inches) 4  Insertion Method: Single Shot.       Assessment/Narrative  Paresthesias: No.  Injection made incrementally with aspirations every 5 mL..  The placement was negative for: blood aspirated, painful injection and site bleeding.  Bolus given via needle. No blood aspirated via catheter.   Secured via.   Complications: none. Test dose of 5 mL lidocaine 2% w/ 1:200,000 epinephrine at. Test dose negative for signs of intravascular, subdural or intrathecal injection. Comments:  Total volume injected at Sciatic nerve:30cc    Total volume injected at Saphenous Nerve:10cc

## 2017-09-28 NOTE — OP NOTE
[facility] OPERATIVE REPORT  Operative Report - Orthopedics  GIUSEPPE Kam 1960, MRN [id number]    Surgery Date: 17    PCP: Tresa Best, [phone]   Code status:  No Order       OPERATION SITE:  [facility] Operating Room       OPERATIVE REPORT  DR. PEDRO LEDESMA  FOOT & ANKLE SURGEON  [facility]    DATE OF PROCEDURE: 17    SITE: [facility] Operating Room    SURGEON: Dr. Pedro Ledesma - [facility]    ASSISTANT: Durga Pablo, PGY III    PREOPERATIVE DIAGNOSES:  1. Left ankle impingement and instability medial plus lateral  2. Left peroneal tendon pain  3. Left posterior tibial tendon distal pain    POSTOPERATIVE DIAGNOSES:  1. Left ankle impingement and instability medial plus lateral  2. Left peroneal tendon tenosynovitis tendons intact  3. Left posterior tibial tendon intact    PROCEDURES PERFORMED:  1. Left ankle arthroscopic evaluation and debridement  2. Left lateral ankle collateral ankle ligament repair with internal augmentation  3. Peroneal tendon sheath tenosynovectomy  4. Anterior medial ankle deltoid ligament repair/imbrication  5. Posterior splint application below the knee ankle neutral    ANESTHESIA: Gen. with popliteal block and local anesthetic regional anesthesia conducted by anesthesia team    POSITION: Supine    HEMOSTASIS: Left thigh tourniquet at 300 mmHg    ESTIMATED BLOOD LOSS: 10 mL    FINDINGS: Left ankle with impingement tissues, low-lying inferior anterior tib fib ligament with impingement. Gross evidence of lateral ankle ligamentous instability and medial anterior inflammatory changes and medial instability with attenuation of the deltoid ligament anterior portion. Cartilaginous surfaces of the ankle were intact. Tenosynovitis of the peroneal tendon sheath likely secondary to compensation appreciated.    IMPLANTS: Arthrex 3.0 bio suture tack anchors 1 medial and lateral plus internal bio swivel lock  anchors to complete the augmentation/internal brace procedure of the left collateral ankle ligaments from the talus to the fibula.     SPECIMENS: None.    INDICATIONS FOR THE OPERATION:   57 year old female has been seen and evaluated for the previously listed diagnoses.   Both operative and non operative care options have been reviewed for this condition.  They are aware of the operation implications and associated pros, cons, risks and benefits.  They were made aware of the post-operative demands and details.  The patient gives verbal and written consent to the above mentioned procedures.      DESCRIPTION OF THE PROCEDURE:  The patient was identified in the preoperative holding area.  The surgical site was marked.  The operative extremity was initialed.  The History and Physical examination was reviewed and/or updated as necessary.  The operative consent was reviewed, confirmed and signed by the patient and procedural details were again reviewed with patient and family members present.    The patient was then taken to the operating room assigned and was positioned on the operative table.  Anesthesia was then administered and the airway was maintained.  IV antibiotics were administered.  The tourniquet was applied.  Left foot and ankle margins were then prepped and draped in a sterile manner/aseptic technique.  A surgical time-out was then performed to identify the proper patient, surgical site(s) and the procedures to be performed.  Following this, local anesthetic was administered about the operative site utilizing a mixture of 1:1 2% Lidocaine plain and 0.5% Marcaine plain, for a total of 30 mL's.  The esmarch was then utilized to exsanguinate the patient's extremity and the tourniquet was inflated for the duration of the procedures.    Attention was then directed to the left ankle. The ankle joint was insufflated with 15 cc of sterile injectable saline. Additional half percent Marcaine injected peripherally  about the ankle 20 cc utilized. The limb was exsanguinated with an Esmarch and the thigh tourniquet was inflated. The external noninvasive ankle distractor was then applied. The anterior medial in the anterior lateral portals were established via safe zones. Then interchangeably utilizing these as viewing and working portals the anterior ankle evaluation decompression was conducted. Significant acute and chronic synovitis appreciated as well as an impingement/Triston's lesion of the low-lying inferior tib-fib ligament structure. This was treated with a biter and the full radius resector. Pre-and post-arthroscopic image debridement was appreciated. These were saved to the system. Cartilaginous surfaces and 20 1. ankle examination was conducted. Cartilaginous surfaces were intact. Under mechanical stresses inversion and anterior drawer lateral ligament instability was confirmed with arthroscopic evaluation. Additionally, eversion stress revealed laxity of the anterior deltoid ligamentous complex. Secondary to this medial plus lateral instability correction was planned for. After thorough lavage of the ankle deep portals were reapproximated with 3-0 nylon portal stitch. Then ankle external distractor was removed. A lateral incision was made from the posterior malleolus anteriorly to the level of the anterior talofibular ligament. Through this 5 cm incision with made with a #15 blade layered dissection was conducted with neurovascular identification retraction. Upon dissecting out the capsular ligamentous tissues is found to be significant only unstable and attenuated. A peroneal tendon sheath was opened up. The tendons was also found to be intact without splitting or fraying. Tenosynovitis of the tendon sheath likely from secondary compensation was appreciated via a micro-tenotomy scissors. Then, the lateral, collateral ankle ligaments were dissected off of the fibula. The fibula was partially decorticated. Then drill  holes were placed for a bio suture tack anchor, 3.0 in size. Additional drill hole placed for the 3.75 bio swivel lock anchor for the internal brace. An laterally, percutaneous to the retinaculum and ligament small slit was made in the drill holes placed in the lateral body of the talus. Then a 4.75 swivel lock bio anchor was tapped for and placed. The 2 stranded double-ended collagen coated fiber tape was then passed back to the fibula and implanted with a 3.75 by also locking for internal augmentation. The remaining strands from the 3.0 bio suture tack were then passed through the extensor retinaculum portions of the calcaneal fibular ligament and anterior talofibular ligament and retinaculum were then imbricated with no medications stitch back to the fibula for improved lateral support with internal augmentation. This site was irrigated. Additional sutures placed at the level of the modified Broström with 2-0 Vicryl. The retinaculum of the peroneal tendons reapproximated with 3-0 Vicryl suture. The skin reapproximated with a 3-0 nylon suture. Then a small medial incision made about the anterior malleoli or colliculus. Through this 3 cm incision layer dissection was conducted the attenuated underlying anterior portion of the deltoid was appreciated this was dissected off the medial malleolus. This portion of the medial malleolus was partially debrided/decorticated with a rongeur and a rasp. The drill holes placed with 3.0 bio suture tack anchor. The 2 strands of this of #2 FiberWire passed into the native portions of the anterior deltoid ligament and then imbricated back on the lateral periosteum of the malleolus with an inversion mechanism. This improved the overall medial lateral instability that was appreciated prior to the repair the medial side was irrigated and closed in anatomical layers with 2-0 Vicryl suture and 3-0 nylon suture    A sterile compressive, layered dressing was then applied.  Vascular status  was intact after deflation of the tourniquet.    SPLINT:  A custom, posterior neutral, fiberglass, below-knee, ankle splint was then applied with the ankle in the appropriate position.      COMPLICATIONS: No direct complications were  encountered throughout the procedures.    The patient tolerated these procedures well and was transferred from the operative table to the transport cart and taken from the OR to the PACU.  In PACU, patient and staff given orders and instructions for post-operative care in the following areas: post op pain management, weight-bearing status, dressing/splint care, weight-bearing assistive devices, elevation of the extremity, ice/cold therapy, DVT/blood clot prevention, nutrition and post-operative concerns to be aware.  Case and post-operative care measures were reviewed with the patient and family members present.  A post operative instruction sheet was provided.  If concerns or questions arise they will contact our clinic.  If acute concerns develop they will present emergently to a nearby medical center.      Please note that voice recognition software utilized to complete this documentation.  Although every effort has been made to correct errors, errors may still remain.      Dr. Pedro Mckeon DPM, FACFAS  Foot & Ankle Surgeon/Specialist  [facility]              CC: [facility]

## 2017-09-29 ENCOUNTER — APPOINTMENT (OUTPATIENT)
Dept: GENERAL RADIOLOGY | Facility: CLINIC | Age: 57
DRG: 502 | End: 2017-09-29
Attending: FAMILY MEDICINE
Payer: MEDICARE

## 2017-09-29 ENCOUNTER — APPOINTMENT (OUTPATIENT)
Dept: CARDIOLOGY | Facility: CLINIC | Age: 57
DRG: 502 | End: 2017-09-29
Attending: FAMILY MEDICINE
Payer: MEDICARE

## 2017-09-29 VITALS
SYSTOLIC BLOOD PRESSURE: 114 MMHG | DIASTOLIC BLOOD PRESSURE: 69 MMHG | RESPIRATION RATE: 18 BRPM | BODY MASS INDEX: 34.96 KG/M2 | WEIGHT: 185.19 LBS | HEART RATE: 73 BPM | OXYGEN SATURATION: 99 % | TEMPERATURE: 97.6 F | HEIGHT: 61 IN

## 2017-09-29 PROBLEM — R06.9 RESPIRATORY ABNORMALITIES: Status: RESOLVED | Noted: 2017-09-28 | Resolved: 2017-09-29

## 2017-09-29 PROBLEM — N18.30 CKD (CHRONIC KIDNEY DISEASE) STAGE 3, GFR 30-59 ML/MIN (H): Chronic | Status: ACTIVE | Noted: 2017-05-11

## 2017-09-29 PROBLEM — D72.829 LEUKOCYTOSIS: Status: ACTIVE | Noted: 2017-09-29

## 2017-09-29 LAB
ALBUMIN SERPL-MCNC: 3.5 G/DL (ref 3.4–5)
ALP SERPL-CCNC: 71 U/L (ref 40–150)
ALT SERPL W P-5'-P-CCNC: 30 U/L (ref 0–50)
ANION GAP SERPL CALCULATED.3IONS-SCNC: 9 MMOL/L (ref 3–14)
AST SERPL W P-5'-P-CCNC: 17 U/L (ref 0–45)
BILIRUB SERPL-MCNC: 0.7 MG/DL (ref 0.2–1.3)
BUN SERPL-MCNC: 12 MG/DL (ref 7–30)
CALCIUM SERPL-MCNC: 8.6 MG/DL (ref 8.5–10.1)
CHLORIDE SERPL-SCNC: 101 MMOL/L (ref 94–109)
CO2 SERPL-SCNC: 27 MMOL/L (ref 20–32)
CREAT SERPL-MCNC: 0.99 MG/DL (ref 0.52–1.04)
ERYTHROCYTE [DISTWIDTH] IN BLOOD BY AUTOMATED COUNT: 12.6 % (ref 10–15)
ERYTHROCYTE [DISTWIDTH] IN BLOOD BY AUTOMATED COUNT: 12.7 % (ref 10–15)
GFR SERPL CREATININE-BSD FRML MDRD: 57 ML/MIN/1.7M2
GLUCOSE SERPL-MCNC: 122 MG/DL (ref 70–99)
HCT VFR BLD AUTO: 37.4 % (ref 35–47)
HCT VFR BLD AUTO: 39 % (ref 35–47)
HGB BLD-MCNC: 12 G/DL (ref 11.7–15.7)
HGB BLD-MCNC: 12.9 G/DL (ref 11.7–15.7)
MCH RBC QN AUTO: 27.8 PG (ref 26.5–33)
MCH RBC QN AUTO: 28.4 PG (ref 26.5–33)
MCHC RBC AUTO-ENTMCNC: 32.1 G/DL (ref 31.5–36.5)
MCHC RBC AUTO-ENTMCNC: 33.1 G/DL (ref 31.5–36.5)
MCV RBC AUTO: 86 FL (ref 78–100)
MCV RBC AUTO: 87 FL (ref 78–100)
PLATELET # BLD AUTO: 238 10E9/L (ref 150–450)
PLATELET # BLD AUTO: 243 10E9/L (ref 150–450)
POTASSIUM SERPL-SCNC: 4 MMOL/L (ref 3.4–5.3)
PROT SERPL-MCNC: 6.9 G/DL (ref 6.8–8.8)
RBC # BLD AUTO: 4.31 10E12/L (ref 3.8–5.2)
RBC # BLD AUTO: 4.55 10E12/L (ref 3.8–5.2)
SODIUM SERPL-SCNC: 137 MMOL/L (ref 133–144)
TROPONIN I SERPL-MCNC: <0.015 UG/L (ref 0–0.04)
WBC # BLD AUTO: 14.5 10E9/L (ref 4–11)
WBC # BLD AUTO: 16.6 10E9/L (ref 4–11)

## 2017-09-29 PROCEDURE — 36415 COLL VENOUS BLD VENIPUNCTURE: CPT | Performed by: PHYSICIAN ASSISTANT

## 2017-09-29 PROCEDURE — 84484 ASSAY OF TROPONIN QUANT: CPT | Performed by: FAMILY MEDICINE

## 2017-09-29 PROCEDURE — 25000132 ZZH RX MED GY IP 250 OP 250 PS 637: Mod: GY | Performed by: PODIATRIST

## 2017-09-29 PROCEDURE — 40000264 ECHO COMPLETE WITH OPTISON

## 2017-09-29 PROCEDURE — 85027 COMPLETE CBC AUTOMATED: CPT | Performed by: PHYSICIAN ASSISTANT

## 2017-09-29 PROCEDURE — 25500064 ZZH RX 255 OP 636: Performed by: PODIATRIST

## 2017-09-29 PROCEDURE — 85027 COMPLETE CBC AUTOMATED: CPT | Performed by: FAMILY MEDICINE

## 2017-09-29 PROCEDURE — 25000132 ZZH RX MED GY IP 250 OP 250 PS 637: Mod: GY | Performed by: FAMILY MEDICINE

## 2017-09-29 PROCEDURE — 71010 XR CHEST PORT 1 VW: CPT

## 2017-09-29 PROCEDURE — A9270 NON-COVERED ITEM OR SERVICE: HCPCS | Mod: GY | Performed by: PHYSICIAN ASSISTANT

## 2017-09-29 PROCEDURE — 40000193 ZZH STATISTIC PT WARD VISIT

## 2017-09-29 PROCEDURE — A9270 NON-COVERED ITEM OR SERVICE: HCPCS | Mod: GY | Performed by: FAMILY MEDICINE

## 2017-09-29 PROCEDURE — 25000128 H RX IP 250 OP 636: Performed by: FAMILY MEDICINE

## 2017-09-29 PROCEDURE — A9270 NON-COVERED ITEM OR SERVICE: HCPCS | Mod: GY | Performed by: PODIATRIST

## 2017-09-29 PROCEDURE — 25000132 ZZH RX MED GY IP 250 OP 250 PS 637: Mod: GY | Performed by: PHYSICIAN ASSISTANT

## 2017-09-29 PROCEDURE — 99232 SBSQ HOSP IP/OBS MODERATE 35: CPT | Performed by: PHYSICIAN ASSISTANT

## 2017-09-29 PROCEDURE — 93306 TTE W/DOPPLER COMPLETE: CPT | Mod: 26 | Performed by: INTERNAL MEDICINE

## 2017-09-29 PROCEDURE — 80053 COMPREHEN METABOLIC PANEL: CPT | Performed by: FAMILY MEDICINE

## 2017-09-29 PROCEDURE — 97116 GAIT TRAINING THERAPY: CPT | Mod: GP

## 2017-09-29 PROCEDURE — 36415 COLL VENOUS BLD VENIPUNCTURE: CPT | Performed by: FAMILY MEDICINE

## 2017-09-29 RX ORDER — SERTRALINE HYDROCHLORIDE 100 MG/1
100 TABLET, FILM COATED ORAL DAILY
Status: DISCONTINUED | OUTPATIENT
Start: 2017-09-29 | End: 2017-09-29 | Stop reason: HOSPADM

## 2017-09-29 RX ORDER — GABAPENTIN 400 MG/1
1200 CAPSULE ORAL
Status: DISCONTINUED | OUTPATIENT
Start: 2017-09-29 | End: 2017-09-29 | Stop reason: HOSPADM

## 2017-09-29 RX ORDER — FUROSEMIDE 40 MG
40 TABLET ORAL DAILY
Status: DISCONTINUED | OUTPATIENT
Start: 2017-09-29 | End: 2017-09-29 | Stop reason: HOSPADM

## 2017-09-29 RX ADMIN — OMEPRAZOLE 20 MG: 20 CAPSULE, DELAYED RELEASE ORAL at 08:02

## 2017-09-29 RX ADMIN — FEXOFENADINE 60 MG: 60 TABLET, FILM COATED ORAL at 00:17

## 2017-09-29 RX ADMIN — FUROSEMIDE 40 MG: 10 INJECTION, SOLUTION INTRAVENOUS at 05:35

## 2017-09-29 RX ADMIN — OXYCODONE HYDROCHLORIDE AND ACETAMINOPHEN 2 TABLET: 5; 325 TABLET ORAL at 05:35

## 2017-09-29 RX ADMIN — ALBUTEROL SULFATE 2 PUFF: 90 AEROSOL, METERED RESPIRATORY (INHALATION) at 18:08

## 2017-09-29 RX ADMIN — ALBUTEROL SULFATE 2 PUFF: 90 AEROSOL, METERED RESPIRATORY (INHALATION) at 00:17

## 2017-09-29 RX ADMIN — CLONAZEPAM 1 MG: 1 TABLET ORAL at 00:17

## 2017-09-29 RX ADMIN — FUROSEMIDE 40 MG: 40 TABLET ORAL at 08:02

## 2017-09-29 RX ADMIN — FUROSEMIDE 40 MG: 10 INJECTION, SOLUTION INTRAVENOUS at 00:17

## 2017-09-29 RX ADMIN — FEXOFENADINE 60 MG: 60 TABLET, FILM COATED ORAL at 08:11

## 2017-09-29 RX ADMIN — BACLOFEN 20 MG: 10 TABLET ORAL at 00:17

## 2017-09-29 RX ADMIN — PSEUDOEPHEDRINE HYDROCHLORIDE 120 MG: 120 TABLET, FILM COATED, EXTENDED RELEASE ORAL at 08:02

## 2017-09-29 RX ADMIN — OXYCODONE HYDROCHLORIDE AND ACETAMINOPHEN 1 TABLET: 5; 325 TABLET ORAL at 01:19

## 2017-09-29 RX ADMIN — OXYCODONE HYDROCHLORIDE AND ACETAMINOPHEN 2 TABLET: 5; 325 TABLET ORAL at 14:27

## 2017-09-29 RX ADMIN — PSEUDOEPHEDRINE HYDROCHLORIDE 120 MG: 120 TABLET, FILM COATED, EXTENDED RELEASE ORAL at 00:17

## 2017-09-29 RX ADMIN — ALBUTEROL SULFATE 2 PUFF: 90 AEROSOL, METERED RESPIRATORY (INHALATION) at 10:03

## 2017-09-29 RX ADMIN — OXYCODONE HYDROCHLORIDE AND ACETAMINOPHEN 1 TABLET: 5; 325 TABLET ORAL at 18:54

## 2017-09-29 RX ADMIN — BACLOFEN 20 MG: 10 TABLET ORAL at 08:03

## 2017-09-29 RX ADMIN — ALBUTEROL SULFATE 2 PUFF: 90 AEROSOL, METERED RESPIRATORY (INHALATION) at 05:35

## 2017-09-29 RX ADMIN — HUMAN ALBUMIN MICROSPHERES AND PERFLUTREN 2 ML: 10; .22 INJECTION, SOLUTION INTRAVENOUS at 14:41

## 2017-09-29 RX ADMIN — GABAPENTIN 900 MG: 300 CAPSULE ORAL at 08:06

## 2017-09-29 RX ADMIN — OXYCODONE HYDROCHLORIDE AND ACETAMINOPHEN 2 TABLET: 5; 325 TABLET ORAL at 10:02

## 2017-09-29 NOTE — PROGRESS NOTES
Sister states that the 1st IV bag of fluids that was started before surgery was infused and a 2nd 1000ml LR was hung before surgery = total IV fluids today 1900cc.

## 2017-09-29 NOTE — DISCHARGE INSTRUCTIONS
Same Day Surgery Discharge Instructions  Special Precautions After Surgery - Adult    1. It is not unusual to feel lightheaded or faint, up to 24 hours after surgery or while taking pain medication.  If you have these symptoms; sit for a few minutes before standing and have someone assist you when getting up.  2. You should rest and relax for the next 24 hours and must have someone stay with you for at least 24 hours after your discharge.  3. DO NOT DRIVE any vehicle or operate mechanical equipment for 24 hours following the end of your surgery.  DO NOT DRIVE while taking narcotic pain medications that have been prescribed by your physician.  If you had a limb operated on, you must be able to use it fully to drive.  4. DO NOT drink alcoholic beverages for 24 hours following surgery or while taking prescription pain medication.  5. Drink clear liquids (apple juice, ginger ale, broth, 7-Up, etc.).  Progress to your regular diet as you feel able.  6. Any questions call your physician and do not make important decisions for 24 hours.    ACTIVITY  ? Off your feet as much as possible for a day or two with foot elevated to heart level or above, then up as tolerated.     INCISIONAL CARE  ? Cover foot with a plastic bag to keep dry to shower.     Call for an appointment to return to the clinic.    Medications:  ? Start a pain pill as soon as you start to feel any pain or at bedtime tonight at the latest, then set an alarm thru the 1st night for every 4 hours to check on patient & give a pain pill.  May take them as needed tomorrow.     Additional discharge instructions: Cold pack behind knee to cool blood going to the foot to decrease swelling & pain.    Break through Bleeding  Get off your feet & get foot elevated way above heart to stop bleeding. Apply cold pack to area.    Post Op Infection  Be alert for signs of infection: redness, swelling, heat, drainage of pus, and/or elevated temperature.  Contact  your surgeon if these occur.    Nausea   If post op nausea occurs, at first rest your stomach for a few hours by eating nothing solid and sipping only clear liquids.  Call your Surgeon if nausea does not resolve in 24 hours.  __________________________________________________________________________________________________________________________________  IMPORTANT NUMBERS:    [facility] Main Number:  [phone], [phone]  Pharmacy:  [phone]  Same Day Surgery:  [phone], Monday - Friday until 8:30 p.m.  Urgent Care:  [phone]  Emergency Room:  [phone]      [facility]:  [phone]                                                                             [facility]:  [phone] option 1  [facility]:  [phone]     [facility]:  [phone]   [facility]:  [phone]   Home Medical Equipment: [phone]  [facility]:  [phone]

## 2017-09-29 NOTE — CONSULTS
Care Transitions:  Received a consult for discharge planning.  Met with the patient and her sister, she lives with her 92 year old Father.  Discussed home care, declined the need.  Her sister is available to assist if needed.  There are no discharge needs identified.  Alis Laughlin RN, Care Coordinator [phone]

## 2017-09-29 NOTE — H&P
CHIEF COMPLAINT:  Hypoxia and coughing following surgical procedure today.      HISTORY OF PRESENT ILLNESS:  Patricia Smith is a 57-year-old female with a history of asthma, reflux, hypothyroidism, and chronic kidney disease.  She presented for left ankle surgery.  She had left ankle arthroscopic debridement and ligament repairs.  The patient had a general anesthetic.  There was no obvious aspiration, but following extubation she had persistent coughing and hypoxia.  Chest x-ray was obtained which showed mild to moderately increased interstitial opacities in both lungs, likely representing pulmonary edema.      The patient was eventually transferred to the floor on 2 liters of oxygen with sats at 95%.  Her pulse and blood pressure were essentially normal.      When I saw her, she reported mild shortness of breath.  Her biggest complaint was persistent dry coughing, and she had some discomfort in her anterior abdomen and chest that she thought was from coughing.  She had no fever or chills.      She tells me she does not have congestive heart failure, but she has fluid retention.  She normally would take Lasix, and she said that she failed to take her regular Lasix on the day of surgery, although she was apparently supposed to.      PAST MEDICAL HISTORY:   1.  Depression.   2.  Degenerative joint disease.   3.  Hypothyroidism.   4.  Reflux.   5.  Chronic back pain.   6.  Mild intermittent asthma.   7.  Anxiety.   8.  Chronic kidney disease.   9.  Rhinitis.      PAST SURGICAL HISTORY:   1.  Tonsillectomy and adenoidectomy.   2.  Surgery for bowel obstruction.   3.  Shoulder surgery.   4.  Laminectomy and discectomy.   5.  Hysterectomy.   6.  Hemorrhoidectomy.   7.  Multiple ENT surgeries.   8.   x3.   9.  Multiple back surgeries.   10.  Appendectomy.      FAMILY MEDICAL HISTORY:  Mother with diabetes, depression, anxiety, and asthma.  Father with hypertension, hyperlipidemia, prostate and skin cancer.  Sister  with ovarian cancer, and a second sister with ovarian cancer.      HABITS:  Former smoker, quit in the past.  No alcohol use.      SOCIAL HISTORY:  She lives locally.      ALLERGIES:   1.  Bactrim (hives).   2.  Codeine (hives).   3.  Erythromycin (GI disturbance).   4.  Hydrocodone (hives).   5.  Lortab (hives).   6.  Penicillins (hives).      MEDICATIONS:   1.  Sertraline 100 mg daily.   2.  Gabapentin 300 mg, 3 capsules in the morning, 4 capsules in the evening.   3.  Allegra-D 1 b.i.d.   4.  Omeprazole 1 daily.   5.  Mobic 50 mg daily.   6.  Klonopin 1 or 2 by mouth nightly as needed for anxiety.   7.  Baclofen 20 mg b.i.d.   8.  Lasix 20 mg daily.   9.  Multivitamin daily.   10.  Vitamin D daily.   11.  Vitamin B12 daily.   12.  Advair HFA (45/21) 2 puffs two times daily.   13.  Albuterol MDI 2 puffs q. 6 hours p.r.n.      REVIEW OF SYSTEMS:  No headache, no neurological symptoms.  No throat symptoms.  She has cough and very mild shortness of breath.  She has discomfort when she coughs in her lower chest and upper abdomen.  She says it is from repetitive coughing.  No other abdominal pain or chest pain.  No change in bowel habits.  She has chronic lower extremity edema.  She did not take her Lasix today.  No neurological symptoms.  Rest of 10-point review of systems is negative.      PHYSICAL EXAM:   GENERAL:  She is alert.   VITAL SIGNS:  Temperature 99.5, heart rate 90, blood pressure 150/79, respiratory rate 20, sat 95% on 2 liters.   EARS:  Negative.   ORAL:  Negative.   NECK:  Supple.  No neck vein distention.   LUNGS:  Bilateral crackles up retirement.  No real respiratory distress.   COR:  S1, S2, without click, rub or murmur.   ABDOMEN:  Soft, benign, nontender.   EXTREMITIES:  Negative except for the splint.  CMS in her toes is normal.   NEUROLOGICAL:  Grossly normal.      LABORATORY DATA:  Troponin, BMP, CBC and BNP pending.  EKG was normal.  Chest x-ray as above.      ASSESSMENT:   1.  Hypoxia and  cough, postop ankle surgery -- rule out aspiration, rule out fluid overload or CHF, rule out other.   2.  History of asthma.   3.  History of chronic lower extremity edema with diuretic use.   4.  History of gastroesophageal reflux disease.      PLAN:  I talked to Anesthesia at length.  They did not note aspiration, but that certainly is possible.  She did not take Lasix, and she does have lower extremity edema, so I think fluid overload or pulmonary edema is possible.  We will obtain troponins.  VBG is pending.  I am going to go ahead and give her Lasix 40 mg x2 doses tonight.  We will repeat a chest x-ray in a.m.  Watch her closely.  We will keep her on telemetry.  I will tentatively order a cardiac echo.  I am going to hold her oral Lasix and Mobic and other nonessential medications.  I will give her, her other regular medications.         SOFIA RODRIGUEZ MD             D: 2017 22:54   T: 2017 03:02   MT: [id number]#[id number]      Name:     MICKY BRIZUELA   MRN:      [id number]        Account:      [id number]   :      1960           Admitted:     [id number]      Document: [id number]       cc: Pedro Best PA-C

## 2017-09-29 NOTE — DISCHARGE SUMMARY
[facility] Orthopedics Discharge Summary                                  [facility]     MICKY BRIZUELA [id number]   Age: 57 year old  PCP: Tresa Best, [phone] 1960     Date of Admission:  9/28/2017  Date of Discharge::  9/29/2017  Discharge Physician:  Simran Mckinney    Code status:  Full Code    Admission Information:  Admission Diagnosis:  Left ankle instability, impingement  Respiratory abnormalities  Hypoxia    Post-Operative Day: 1 Day Post-Op     Reason for admission:  The patient was admitted for the following:Procedure(s) (LRB):  COMBINED ARTHROSCOPY ANKLE, OPEN REPAIR LIGAMENT (Left)    Principal Problem:    Hypoxia  Active Problems:    Depression with anxiety    Hypothyroidism    Esophageal reflux    Mild intermittent asthma    Anxiety    CKD (chronic kidney disease) stage 3, GFR 30-59 ml/min    Leukocytosis      Allergies:  Bactrim [sulfamethoxazole w/trimethoprim]; Codeine; Erythromycin; Hydrocodone; Lortab [hydrocodone-acetaminophen]; and Penicillins    Following the procedure noted above the patient was transferred to the post-op floor and started on:    Therapy:  physical therapy  Anticoagulation Plan:  mg daily  for 14 days  Pain Management: percocet  Weight bearing status: Non-weight bearing     The patient was followed and co-managed by the hospitalist service during the inpatient treatment course  Complications:  Patient was admitted post operatively for mild respiratory distress. Patient did not take PTA lasix prior to surgery. Patient has now been voiding well, SOB and coughing has improved. Internal medicine thought this to be bronchospasm vs CHS vs aspiration. Thus far work up has been negative. Patient will be discharged home today.   Consultations:  None     Pertinent Labs   Lab Results: personally reviewed.     Recent Labs   Lab Test  09/29/17   1212  09/29/17   0540  09/28/17   2245  09/14/17   1439  04/07/15   INR   --    --    --    --    --    0.9   HGB  12.0  12.9  13.0   --    < >   --    HCT  37.4  39.0  40.7   --    < >   --    MCV  87  86  86   --    < >   --    PLT  238  243  218   --    < >   --    NA   --   137  141  138   < >  140    < > = values in this interval not displayed.          Discharge Information:  Condition at discharge: Stable  Discharge destination:  Discharged to home     Medications at discharge:  Current Discharge Medication List      START taking these medications    Details   !! order for DME Equipment being ordered: Other: Roll About scooter  Treatment Diagnosis: L ankle surgery  Qty: 1 Units, Refills: 0    Associated Diagnoses: S/P ankle ligament repair      oxyCODONE-acetaminophen (PERCOCET) 5-325 MG per tablet Take 1-2 tablets by mouth every 4 hours as needed for pain (moderate to severe)  Qty: 38 tablet, Refills: 0    Associated Diagnoses: Post-op pain      hydrOXYzine (ATARAX) 25 MG tablet Take 1 tablet (25 mg) by mouth every 6 hours as needed for itching (and nausea)  Qty: 38 tablet, Refills: 0    Associated Diagnoses: Post-op pain      aspirin  MG EC tablet Take 1 tablet (325 mg) by mouth daily  Qty: 20 tablet, Refills: 0    Associated Diagnoses: Post-op pain       !! - Potential duplicate medications found. Please discuss with provider.      CONTINUE these medications which have NOT CHANGED    Details   gabapentin (NEURONTIN) 300 MG capsule 3 caps morning and 4 caps evening.  Qty: 270 capsule, Refills: 5    Associated Diagnoses: Chronic low back pain, unspecified back pain laterality, with sciatica presence unspecified; Muscle twitching      fexofenadine-pseudoePHEDrine (ALLEGRA-D)  MG per 12 hr tablet Take 1 tablet by mouth 2 times daily  Qty: 60 tablet, Refills: 11    Associated Diagnoses: Allergic rhinitis due to pollen, unspecified chronicity, unspecified seasonality      omeprazole (PRILOSEC) 20 MG CR capsule TAKE ONE CAPSULE BY MOUTH EVERY DAY. IF NO BENEFIT IN 1-2 WEEKS, OK TO INCREASE TO TAKE TWO  CAPSULES BY MOUTH EVERY DAY  Qty: 30 capsule, Refills: 1    Associated Diagnoses: Gastroesophageal reflux disease, esophagitis presence not specified      meloxicam (MOBIC) 15 MG tablet TAKE ONE TABLET BY MOUTH EVERY DAY  Qty: 90 tablet, Refills: 0    Associated Diagnoses: Chronic low back pain, unspecified back pain laterality, with sciatica presence unspecified      clonazePAM (KLONOPIN) 1 MG tablet Take 1-2 tablets (1-2 mg) by mouth nightly as needed for anxiety Trying to reduce amount of clonazepam and see if we can get you off it.  Currently taking 1.5 mg per day.  Refill not more than monthly.  Qty: 45 tablet, Refills: 1    Associated Diagnoses: Chronic low back pain, unspecified back pain laterality, with sciatica presence unspecified; Muscle twitching      baclofen (LIORESAL) 20 MG tablet Take 1 tablet (20 mg) by mouth 2 times daily  Qty: 60 tablet, Refills: 3    Associated Diagnoses: Chronic low back pain, unspecified back pain laterality, with sciatica presence unspecified; Muscle twitching      furosemide (LASIX) 40 MG tablet TAKE ONE TABLET BY MOUTH EVERY DAY  Qty: 90 tablet, Refills: 3    Associated Diagnoses: Peripheral edema      sertraline (ZOLOFT) 100 MG tablet Take 1 tablet (100 mg) by mouth daily  Qty: 45 tablet, Refills: 5    Associated Diagnoses: Anxiety; Major depressive disorder, recurrent episode, mild (H)      !! order for DME Equipment being ordered: ankle, air, stirrup, universal  Qty: 1 Device, Refills: 0    Associated Diagnoses: Sprain of left ankle, unspecified ligament, initial encounter      Multiple Vitamins-Minerals (MULTI FOR HER 50+) CAPS       Cholecalciferol (VITAMIN D-3) 1000 UNITS CAPS Take 5,000 Units by mouth daily       cyanocobalamin (VITAMIN  B-12) 1000 MCG tablet Take 1,000 mcg by mouth daily      fluticasone-salmeterol (ADVAIR-HFA) 45-21 MCG/ACT inhaler Inhale 2 puffs into the lungs 2 times daily  Qty: 12 g, Refills: 1    Associated Diagnoses: Mild intermittent asthma  without complication      albuterol (PROAIR HFA, PROVENTIL HFA, VENTOLIN HFA) 108 (90 BASE) MCG/ACT inhaler Inhale 2 puffs into the lungs every 6 hours  Qty: 1 Inhaler, Refills: 3    Associated Diagnoses: Mild intermittent asthma without complication       !! - Potential duplicate medications found. Please discuss with provider.                     Follow-Up Care:  Patient should be seen in the office in 10-14 days by the Orthopedic Surgeon/Physician Assistant.  Call [phone] for appointment or questions.    Simran Mckinney

## 2017-09-29 NOTE — PROGRESS NOTES
"[facility] Orthopaedics Progress Note      Post-operative Day: 1 Day Post-Op    Procedure(s):  Left ankle arthroscopic debridement, medial and lateral ligament repair and tendon evaluations. - Wound Class: I-Clean      Subjective:    Pain: minimal  aching to L ankle. Patient reports block is still effective. Currently has minimal digital movement and has little sensation due to block  denies Chest pain, SOB, O2 required: None  denies nausea/ emesis  denies lightheadedness, dizziness and weakness         Objective:  Blood pressure 100/57, pulse 73, temperature 97.6  F (36.4  C), temperature source Oral, resp. rate 18, height 1.549 m (5' 1\"), weight 84 kg (185 lb 3 oz), last menstrual period 02/24/1993, SpO2 100 %.    Patient Vitals for the past 24 hrs:   BP Temp Temp src Pulse Heart Rate Resp SpO2 Height Weight   09/29/17 0800 - 97.6  F (36.4  C) Oral 73 - - 100 % - -   09/29/17 0636 - - - - - - - - 84 kg (185 lb 3 oz)   09/29/17 0424 100/57 97.3  F (36.3  C) Oral 69 - 18 94 % - -   09/28/17 2332 - - - - 83 - - - -   09/28/17 2240 150/79 99.5  F (37.5  C) Oral 90 - 20 95 % 1.549 m (5' 1\") 84.8 kg (186 lb 15.2 oz)   09/28/17 2200 132/84 - - - - - 96 % - -   09/28/17 2130 125/83 - - - - 20 96 % - -   09/28/17 2101 139/79 - - - 84 20 - - -   09/28/17 2045 - - - - - - (!) 88 % - -   09/28/17 2030 150/78 - - - 86 18 90 % - -   09/28/17 2000 (!) 147/91 - - - 83 16 - - -   09/28/17 1930 - - - - - 12 93 % - -   09/28/17 1915 (!) 158/92 - - - 91 12 - - -   09/28/17 1900 154/83 98.3  F (36.8  C) Oral - 86 13 98 % - -   09/28/17 1845 93/47 - - - 89 11 91 % - -   09/28/17 1840 (!) 151/94 98.2  F (36.8  C) Oral - 90 8 99 % - -   09/28/17 1700 143/81 - - - 67 12 99 % - -   09/28/17 1645 137/83 - - - 70 16 99 % - -   09/28/17 1630 136/71 - - - 73 13 99 % - -   09/28/17 1615 138/72 - - - 75 17 100 % - -   09/28/17 1600 - - - - 71 16 99 % - -   09/28/17 1551 141/71 - - - 64 16 100 % - -   09/28/17 1545 141/71 - - 66 - 16 100 % - - "   09/28/17 1530 145/68 - - 66 - 16 100 % - -   09/28/17 1515 156/82 - - 68 - 20 100 % - -   09/28/17 1454 110/76 - - 70 - 19 100 % - -   09/28/17 1310 124/70 98.4  F (36.9  C) Oral 74 - 16 97 % - -       Wt Readings from Last 4 Encounters:   09/29/17 84 kg (185 lb 3 oz)   09/14/17 80.9 kg (178 lb 6.4 oz)   03/22/17 83 kg (183 lb)   03/16/17 83 kg (183 lb)     General: Alert and orientated. No apparent distress. Non-labored breathing. Appropriate affect.   MSK: L ankle: splint intact and clean. Digits with minimal movement. Sensation is diminished but is returning. No calf pain.       Pertinent Labs   Lab Results: personally reviewed.     Recent Labs   Lab Test  09/29/17   1212  09/29/17   0540  09/28/17   2245  09/14/17   1439  04/07/15   INR   --    --    --    --    --   0.9   HGB  12.0  12.9  13.0   --    < >   --    HCT  37.4  39.0  40.7   --    < >   --    MCV  87  86  86   --    < >   --    PLT  238  243  218   --    < >   --    NA   --   137  141  138   < >  140    < > = values in this interval not displayed.         Procedure(s):  Left ankle arthroscopic debridement, medial and lateral ligament repair and tendon evaluations. - Wound Class: I-Clean  Plan: Anticoagulation protocol:  mg daily  x 14  days            Pain medications:  oxycodone and tylenol            Weight bearing status:  NWB            Dressing Change: keep splint clean, dry and intact until follow up            Disposition:  Home today if cleared by medicine             Follow up: 2 week with Dr. Linda porras and rehabilitation     Report completed by:  Simran Mckinney PA-C  Date: 9/29/2017  Time: 12:23 PM

## 2017-09-29 NOTE — PROGRESS NOTES
"At 1900 pt noted to be coughing when waking up.  Writer listened to pt lung sounds,  lungs clear, all four bases, anterior and posterior. Pt O2 stats remain in the 80's without O2. When on O2 at 2L/min via NC stats are 88-95%.  Pt noted to be coughing and stating \"there is a tickle in the back of my throat.\"   Pt continued to cough on and off.  At 2010 writer listened to pt lung sounds again and noted, crackles at both bases posterior and while exhaling anterior.  RADHA Vallejo updated and also listened to pt lung sounds.  Chest x-ray was ordered per CRNA.       "

## 2017-09-29 NOTE — PROGRESS NOTES
"[facility] [place] [facility] ADMISSION NOTE    Patient admitted to room 2404 at approximately 2240 via cart from surgery. Patient was accompanied by transport tech and sister.     Verbal SBAR report received from Alysha prior to patient arrival.     Patient ambulated to bed NWB with walker. Patient alert and oriented X 3. The patient is not having any pain.  . Admission vital signs: Blood pressure 150/79, pulse 90, temperature 99.5  F (37.5  C), temperature source Oral, resp. rate 20, height 1.549 m (5' 1\"), weight 84.8 kg (186 lb 15.2 oz), last menstrual period 02/24/1993, SpO2 95 %. Patient was oriented to plan of care, call light, bed controls, tv, telephone, bathroom and visiting hours.     The following safety risks were identified during admission: fall. Yellow risk band applied: YES.     Shantell Oneill    "

## 2017-09-29 NOTE — PROGRESS NOTES
[facility] Medicine Progress Note  Date of Service: 09/29/2017    Assessment & Plan   Patricia Smith is a 57 year old female who presented on 9/28/2017 for scheduled Procedure(s):  COMBINED ARTHROSCOPY ANKLE, OPEN REPAIR LIGAMENT by Pedro Mckeon DPM and is being followed by the hospital medicine service for co-management of acute and/or chronic perioperative medical problems.    S/p Procedure(s):  COMBINED ARTHROSCOPY ANKLE, OPEN REPAIR LIGAMENT  - POD #1  - pain control, wound cares, physical therapy, occupational therapy and DVT prophylaxis per orthopedic surgery service    Acute Hypoxemic Respiratory Failure  Post operative.  Felt SOB and was coughing.  Mild respiratory distress.  Did not take her home lasix dose before surgery.  CXR with mild increased interstitium.  Troponin negative x2.  .  Received 2 doses of IV lasix with good urine response.  No events on telemetry overnight. Now on RA and feels at baseline.  DDx: bronchospasm verus CHF versus aspiration event versus multifactorial  - strict I and O's, remove ferreira catheter today and continue to monitor  - order ECHO, assessment of cardiac function and dilation of IVC  - resume home lasix today        Leukocytosis  WBC 16.6 today. Denies ongoing cough, CP, fever, chills, loss of appetite, abdominal pain  DDx: demarginalization s/p surgery  - repeat CBC at noon today    Mild intermittent asthma  - continue PTA allegra-D, albuterol, Advair    Depression with anxiety  - continue PTA Zoloft  - continue PTA PRN klonopin      Esophageal reflux  - continue PTA omeprazole    CKD (chronic kidney disease) stage 3, GFR 30-59 ml/min  Creatinine stable.  Will not monitor further unless symptom warrant        DVT Prophylaxis: as per orthopedic surgery service - Defer to primary service  Code Status: Full Code    Lines: PIV, without edema/erythema   Ferreira catheter: will plan to remove this AM    Discussion: Medically,  the patient appears improved.      Disposition: Anticipate discharge later this afternoon.  Will need to be able to void.  Will need to obtain ECHO and read prior to discharge.  Will need to repeat CBC to ensure no new infectious process given new leukocytosis     Attestation:  I have reviewed today's vital signs, notes, medications, labs and imaging.    Hermila Zaidi PA-C      Interval History   shortness of breath improved.  Feels her breathing is back to baseline.  Denies fever, CP, palpitations, ongoing SOB, ongoing cough, abdominal pain, dysuria, LE pain.  She has some new swelling to the right LE that she feels is a bit worse than baseline.  We are unable to evaluate the left due to her casting.      Physical Exam   Temp:  [97.3  F (36.3  C)-99.5  F (37.5  C)] 97.6  F (36.4  C)  Pulse:  [66-90] 73  Heart Rate:  [64-91] 83  Resp:  [8-20] 18  BP: ()/(47-94) 100/57  SpO2:  [88 %-100 %] 100 %    Weights:   Vitals:    09/28/17 2240 09/29/17 0636   Weight: 84.8 kg (186 lb 15.2 oz) 84 kg (185 lb 3 oz)    Body mass index is 34.99 kg/(m^2).    General: alert and oriented x4, in no acute distress  CV: Regular rate/rhythm, no appreciable murmur, rub, or gallop  Respiratory: Faint bibasilar crackles. Otherwise CTA bilaterally, equal chest expansion  GI: Soft, nontender, normoactive S  Skin: Warm and dry  Musculoskeletal: negative homans to the right.  Non-tender to palpation of posterior calves. Trace pitting edema to anterior right shin.  Neuro: equal  strength    Data     Recent Labs  Lab 09/29/17  0540 09/28/17  2245   WBC 16.6* 11.6*   HGB 12.9 13.0   MCV 86 86    218    141   POTASSIUM 4.0 3.8   CHLORIDE 101 105   CO2 27 31   BUN 12 11   CR 0.99 0.98   ANIONGAP 9 5   BREE 8.6 8.5   * 120*   ALBUMIN 3.5  --    PROTTOTAL 6.9  --    BILITOTAL 0.7  --    ALKPHOS 71  --    ALT 30  --    AST 17  --    TROPI <0.015 <0.015         Recent Labs  Lab 09/29/17  0540 09/28/17  7455   * 120*         Unresulted Labs Ordered in the Past 30 Days of this Admission     No orders found for last 61 day(s).           Imaging  Recent Results (from the past 24 hour(s))   XR Chest Port 1 View    Narrative    CHEST SINGLE VIEW   9/28/2017 9:02 PM     HISTORY: Postoperative. Decreasing oxygen saturation. Crackles in  bilateral lung bases.    COMPARISON: None.    FINDINGS: Mild to moderately increased interstitial opacities in both  lungs. The lungs are otherwise clear. Normal-sized cardiac silhouette.  Partial visualization of fusion hardware in the lower cervical spine.      Impression    IMPRESSION: Mild to moderately increased interstitial opacities in  both lungs, most likely representing interstitial pulmonary edema.    GONSALO MADISON MD   XR Chest Port 1 View    Narrative    XR CHEST PORT 1 VW  9/29/2017 6:02 AM      HISTORY: Hypoxic.     COMPARISON: 9/28/2017.    FINDINGS: Upright portable chest. The heart size is normal. There is  again a band of scar or atelectasis at the right lung base medially.  The lungs are otherwise clear. No pneumothorax.      Impression    IMPRESSION: No acute abnormality.    FERNIE BAIRD MD        I reviewed all new labs and imaging results over the last 24 hours. I personally reviewed the chest x-ray image(s) showing improved interstital edema .    Medications        sertraline  100 mg Oral Daily     furosemide  40 mg Oral Daily     gabapentin  1,200 mg Oral Daily at 8 pm     albuterol  2 puff Inhalation Q6H     baclofen  20 mg Oral BID     fluticasone-vilanterol  1 puff Inhalation Daily     omeprazole  20 mg Oral QAM     gabapentin  900 mg Oral Daily with breakfast     fexofenadine  60 mg Oral BID    And     pseudoePHEDrine  120 mg Oral BID       Hermila Zaidi PA-C

## 2017-09-29 NOTE — PLAN OF CARE
Problem: Patient Care Overview  Goal: Interdisciplinary Rounds/Family Conf  Outcome: Improving  Pain controlled with oral percocet.  Barraza removed at 1100-DTV.  Bladder scan at this time is 112.  Resting comfortably, denies SOA.  VSS, on RA.

## 2017-09-29 NOTE — ANESTHESIA CARE TRANSFER NOTE
Patient: Patricia Smith    Procedure(s):  Left ankle arthroscopic debridement, medial and lateral ligament repair and tendon evaluations. - Wound Class: I-Clean    Diagnosis: Left ankle instability, impingement  Diagnosis Additional Information: No value filed.    Anesthesia Type:   General, Periph. Nerve Block for postop pain, ETT, LMA     Note:  Airway :Nasal Cannula  Patient transferred to:PACU        Vitals: (Last set prior to Anesthesia Care Transfer)    CRNA VITALS  9/28/2017 1805 - 9/28/2017 1905      9/28/2017             Pulse: 84    SpO2: 100 %    Resp Rate (observed): (!)  5                Electronically Signed By: CANDICE Juarez CRNA  September 28, 2017  7:35 PM

## 2017-09-29 NOTE — PLAN OF CARE
"Problem: Patient Care Overview  Goal: Plan of Care/Patient Progress Review  Outcome: Improving  Pt's pain is controlled taking PRN 2 tablets Percocet x2 this shift. Still has some numbness and tingling present in her left ankle due to block from surgery. Dressing c/d/i. Vital signs stable, afebrile, O2 sat 94% on room air.   Lung sounds course crackles in the left upper and lower bases, lung sound diminished on the right. Pt has a frequent nonproductive cough at the beginning of the night, now has an infrequent cough. Pt stated that her breathing \"is feeling much better and easier\". Has received 40 mg IV Lasix twice this shift. Barraza was placed at midnight, pt tolerated well. Has had an output of 3000 cc overnight. Using an ice pack behind the left ankle. Pt uses call light appropriately, able to make her needs be known.           "

## 2017-09-29 NOTE — PROGRESS NOTES
Discharge Planner PT   Patient plan for discharge: Home  Current status: Pt transfers independently. Amb with RW and CGA 10'. Difficult for pt to WB on UE due to weakness and pain at IV site in L wrist. Amb with short shuffle steps, NWB L LE, very slow arelis.  Pt unable to hop up steps, so she scooted up steps on bottom, turned to all fours on top>tall kneel> half kneel> standing with railing.  Barriers to return to prior living situation: low gait endurance  Recommendations for discharge: Home  Rationale for recommendations: cont HEP for LE strengthening       Entered by: Macarena López 09/29/2017 4:54 PM     Physical Therapy Evaluation and Discharge Summary     09/29/17 1600   Quick Adds   Type of Visit Initial PT Evaluation   Living Environment   Lives With parent(s)   Living Arrangements house   Home Accessibility stairs to enter home   Number of Stairs to Enter Home 3   Stair Railings at Home outside, present on right side   Transportation Available family or friend will provide   Functional Level Prior   Ambulation 0-->independent   Transferring 0-->independent   Toileting 0-->independent   Bathing 0-->independent   Dressing 0-->independent   Eating 0-->independent   Communication 0-->understands/communicates without difficulty   Swallowing 0-->swallows foods/liquids without difficulty   Cognition 0 - no cognition issues reported   General Information   Onset of Illness/Injury or Date of Surgery - Date 09/28/17   Referring Physician Dr Mckeon   Patient/Family Goals Statement wants to return home to indpendent living   Pertinent History of Current Problem (include personal factors and/or comorbidities that impact the POC) Pt admitted for L ankle surgery: 1. Left ankle arthroscopic evaluation and debridement 2. Left lateral ankle collateral ankle ligament repair with internal augmentation 3. Peroneal tendon sheath tenosynovectomy 4. Anterior medial ankle deltoid ligament repair/imbrication 5. Posterior splint  application below the knee ankle neutral   Weight-Bearing Status - LLE nonweight-bearing   General Info Comments Pt reports she will be borrowing her brother-in-law's crutches and getting a rolling knee walker.   Cognitive Status Examination   Orientation orientation to person, place and time   Level of Consciousness alert   Follows Commands and Answers Questions 100% of the time   Personal Safety and Judgment intact   Pain Assessment   Patient Currently in Pain Yes, see Vital Sign flowsheet  (L ankle pain rated at 6-7/10)   Range of Motion (ROM)   ROM Comment WFL except L ankle NT due to cast/surgery   Strength   Strength Comments B UE generally 4/5, B LE generally 5/5 except L ankle NT    Bed Mobility   Bed Mobility Comments sit <> supine independently   Transfer Skills   Transfer Comments sit <> stand independently   Gait   Gait Gait Skill;Stairs   Gait Comments Difficult for pt to WB on UE due to weakness and pain at IV site in L wrist. Amb with short shuffle steps, NWB L LE, very slow arelis.  Pt unable to hop up steps, so she scooted up steps on bottom, turned to all fours on top>tall kneel> half kneel> standing with railing.   Gait Skills   Level of [place]: Gait contact guard   Physical Assist/Nonphysical Assist: Gait 1 person assist   Weight-Bearing Restrictions: Gait nonweight-bearing   Assistive Device for Transfer: Gait rolling walker   Gait Distance (10')   Stairs   Self Performance Stand by assist   Physical/Nonphysical Assist: Stairs 1 person assist   Rails 1 rail   Indicate number of stairs 2   Balance   Balance Comments good with RW   General Therapy Interventions   Planned Therapy Interventions gait training   Clinical Impression   Criteria for Skilled Therapeutic Intervention yes, treatment indicated   PT Diagnosis L ankle pain following surgery   Influenced by the following impairments pain and weakness   Functional limitations due to impairments gait, stairs, transfers   Clinical  "Presentation Stable/Uncomplicated   Clinical Presentation Rationale clinical judgement   Clinical Decision Making (Complexity) Low complexity   Therapy Frequency` daily   Predicted Duration of Therapy Intervention (days/wks) 1 visit   Anticipated Discharge Disposition Home with Assist   Risk & Benefits of therapy have been explained Yes   Patient, Family & other staff in agreement with plan of care Yes   [facility] TM \"6 Clicks\"   2016, Trustees of [facility], under license to [email or web address].  All rights reserved.   6 Clicks Short Forms Basic Mobility Inpatient Short Form   [facility]  \"6 Clicks\" V.2 Basic Mobility Inpatient Short Form   1. Turning from your back to your side while in a flat bed without using bedrails? 4 - None   2. Moving from lying on your back to sitting on the side of a flat bed without using bedrails? 4 - None   3. Moving to and from a bed to a chair (including a wheelchair)? 3 - A Little   4. Standing up from a chair using your arms (e.g., wheelchair, or bedside chair)? 4 - None   5. To walk in hospital room? 3 - A Little   6. Climbing 3-5 steps with a railing? 3 - A Little   Basic Mobility Raw Score (Score out of 24.Lower scores equate to lower levels of function) 21   Total Evaluation Time   Total Evaluation Time (Minutes) 15     See Care Plan for Goals.    Macarena López PT      "

## 2017-09-30 NOTE — PROGRESS NOTES
[facility] DISCHARGE NOTE    Patient discharged to home at 7:08 PM via wheel chair. Accompanied by sister and staff. Discharge instructions reviewed with patient, opportunity offered to ask questions. Prescriptions filled and sent with patient upon discharge. All belongings sent with patient.    Celia Wadsworth

## 2017-10-02 ENCOUNTER — TELEPHONE (OUTPATIENT)
Dept: FAMILY MEDICINE | Facility: CLINIC | Age: 57
End: 2017-10-02

## 2017-10-02 NOTE — TELEPHONE ENCOUNTER
"ED/Discharge Protocol    \"Hi, my name is Leonora Monteiro, a registered nurse, and I am calling on behalf of Tresa Best PA-C's office at [facility].  I am calling to follow up and see how things are going for you after your recent visit.\"    \"I see that you were in the (ER/UC/IP) on 9-28-17.    How are you doing now that you are home?\" in pain, but, OK     Is patient experiencing symptoms that may require a hospital visit?  no    Discharge Instructions    \"Let's review your discharge instructions.  What is/are the follow-up recommendations?  Pt. Response: no    \"Were you instructed to make a follow-up appointment?\"  Pt. Response: Yes    \"When you see the provider, I would recommend that you bring your discharge instructions with you.    Medications    \"How many new medications are you on since your hospitalization/ED visit?\"    0-1  \"How many of your current medicines changed (dose, timing, name, etc.) while you were in the hospital/ED visit?\"   0-1  \"Do you have questions about your medications?\"   No  \"Were you newly diagnosed with heart failure, COPD, diabetes or did you have a heart attack?\"   No  For patients on insulin: \"Did you start on insulin in the hospital or did you have your insulin dose changed?\"   No    Medication reconciliation completed? No    Was MTM referral placed (*Make sure to put transitions as reason for referral)?   No    Call Summary    \"Do you have any questions or concerns about your condition or care plan at the moment?\"    No  Triage nurse advice given: call if questions    Patient was in ER 0 in the past year (assess appropriateness of ER visits.)      \"If you have questions or things don't continue to improve, we encourage you contact us through the main clinic number,  [phone].  Even if the clinic is not open, triage nurses are available 24/7 to help you.     We would like you to know that our clinic has extended hours (provide information).  We also have urgent care " "(provide details on closest location and hours/contact info)\"      \"Thank you for your time and take care!\"        "

## 2017-10-02 NOTE — TELEPHONE ENCOUNTER
ED/UC/IP follow up phone call: S/P Ankle Ligament Repair - Post Op Pain - 09/29/17    RN please call to follow up.    Number of ED visits in past 12 months = 0

## 2017-10-09 PROBLEM — I25.2 OLD MI (MYOCARDIAL INFARCTION): Status: ACTIVE | Noted: 2017-10-09

## 2017-10-19 ENCOUNTER — TRANSFERRED RECORDS (OUTPATIENT)
Dept: HEALTH INFORMATION MANAGEMENT | Facility: CLINIC | Age: 57
End: 2017-10-19

## 2017-10-19 NOTE — DISCHARGE SUMMARY
Outpatient Physical Therapy Discharge Note     Patient: Patricia Smith  : 1960    Beginning/End Dates of Reporting Period:  17 to 2017    Referring Provider: Patrick Muhammad MD ([facility])    Therapy Diagnosis: Low back pain; Global myotomal weakness of the L LE, impaired gait and endurance     Client Self Report: Feeling more tired today from less sleep. Not doing     Objective Measurements:  Objective Measure: TUG  Details: 8.9sec         Outcome Measures (most recent score):  Patient did not attend follow up visit, status unknown at this time.      Goals:  Goal Identifier easier to perform car transfers   Goal Description Following PT interventions, pt will increase lubmar L Side bend AROM to fingertips to sup patella for make it easier for pt to perform car transfers   Target Date 17   Date Met      Progress:     Goal Identifier standing >30min   Goal Description Following PT interventions, pt will amb with full up right posture to be able to standing >30min   Target Date 17   Date Met      Progress:     Goal Identifier walk 1 mile   Goal Description Following PT interventions, pt will increase B hip ER to 4/5 MMT to be able to walk 1 mile   Target Date 10/18/17   Date Met   (increased reps)   Progress:     Goal Identifier lifting medium wt   Goal Description Following PT interventions, pt will score 20% on ОЛЕГ to be able to lift medium weight at work.   Target Date 17   Date Met      Progress:     Goal Identifier     Goal Description     Target Date     Date Met      Progress:     Goal Identifier     Goal Description     Target Date     Date Met      Progress:     Goal Identifier     Goal Description     Target Date     Date Met      Progress:     Goal Identifier     Goal Description     Target Date     Date Met      Progress:     Progress Toward Goals:   Progress this reporting period: Patient participated in 2 skilled PT treatment sessions over 2 weeks with progress made towards   goals, progressed resistance training, decreased fall risk per TUG.    Pt recently had ankle surgery on 9/28/17, please see EPIC op notes for details    Patient did not return for follow up treatments as directed. Last skilled PT treatment visit was more than 30 days ago. Goal status and current objective information is therefore unknown.  Discharge from PT services at this time for this episode of treatment. Please see attached documentation under this episode of care for further information including dates of service, start of care date, referring physician, diagnosis, treatment plan, treatments, etc.    Please contact me with any questions or concerns.    Thank you for your referral.    Helder Oh, PT, DPT  Doctor of Physical Therapy  [facility]  [phone]              Plan:  Discharge from therapy.    Discharge:    Reason for Discharge: Patient has failed to schedule further appointments.    Equipment Issued: na    Discharge Plan: Discharge plan not established prior to last skilled PT visit.

## 2017-10-26 DIAGNOSIS — M54.5 CHRONIC LOW BACK PAIN, UNSPECIFIED BACK PAIN LATERALITY, WITH SCIATICA PRESENCE UNSPECIFIED: ICD-10-CM

## 2017-10-26 DIAGNOSIS — G89.29 CHRONIC LOW BACK PAIN, UNSPECIFIED BACK PAIN LATERALITY, WITH SCIATICA PRESENCE UNSPECIFIED: ICD-10-CM

## 2017-10-27 RX ORDER — MELOXICAM 15 MG/1
TABLET ORAL
Qty: 90 TABLET | Refills: 0 | Status: SHIPPED | OUTPATIENT
Start: 2017-10-27 | End: 2018-01-24

## 2017-10-30 DIAGNOSIS — M54.5 CHRONIC LOW BACK PAIN, UNSPECIFIED BACK PAIN LATERALITY, WITH SCIATICA PRESENCE UNSPECIFIED: ICD-10-CM

## 2017-10-30 DIAGNOSIS — R25.3 MUSCLE TWITCHING: ICD-10-CM

## 2017-10-30 DIAGNOSIS — G89.29 CHRONIC LOW BACK PAIN, UNSPECIFIED BACK PAIN LATERALITY, WITH SCIATICA PRESENCE UNSPECIFIED: ICD-10-CM

## 2017-10-30 DIAGNOSIS — K21.9 GASTROESOPHAGEAL REFLUX DISEASE, ESOPHAGITIS PRESENCE NOT SPECIFIED: ICD-10-CM

## 2017-10-31 RX ORDER — CLONAZEPAM 1 MG/1
TABLET ORAL
Refills: 0 | OUTPATIENT
Start: 2017-10-31

## 2017-10-31 NOTE — TELEPHONE ENCOUNTER
"Per June appt, \"I agree with weaning off of the Klonopin. I recommend you decrease to 1mg nightly for 1 week, then further cut doses in half: 1mg/0.5mg every other night the following week, then 0.5mg nightly for a week, then every other night, then stop).    Per patient,  \"Is down to taking one nightly\".    Has not tapered further.    Advised I would let Dr Alaniz know to determine request.    Thelma Ahumada RN  [facility]      "

## 2017-11-02 ENCOUNTER — MYC REFILL (OUTPATIENT)
Dept: NEUROLOGY | Facility: CLINIC | Age: 57
End: 2017-11-02

## 2017-11-02 ENCOUNTER — OFFICE VISIT (OUTPATIENT)
Dept: FAMILY MEDICINE | Facility: CLINIC | Age: 57
End: 2017-11-02
Payer: MEDICARE

## 2017-11-02 ENCOUNTER — MYC MEDICAL ADVICE (OUTPATIENT)
Dept: FAMILY MEDICINE | Facility: CLINIC | Age: 57
End: 2017-11-02

## 2017-11-02 ENCOUNTER — MEDICAL CORRESPONDENCE (OUTPATIENT)
Dept: HEALTH INFORMATION MANAGEMENT | Facility: CLINIC | Age: 57
End: 2017-11-02

## 2017-11-02 VITALS
WEIGHT: 185 LBS | BODY MASS INDEX: 34.93 KG/M2 | SYSTOLIC BLOOD PRESSURE: 125 MMHG | HEIGHT: 61 IN | TEMPERATURE: 98.1 F | HEART RATE: 83 BPM | DIASTOLIC BLOOD PRESSURE: 78 MMHG

## 2017-11-02 DIAGNOSIS — M79.645 THUMB PAIN, LEFT: Primary | ICD-10-CM

## 2017-11-02 DIAGNOSIS — R09.02 POSTOPERATIVE HYPOXIA: ICD-10-CM

## 2017-11-02 DIAGNOSIS — M54.5 CHRONIC LOW BACK PAIN, UNSPECIFIED BACK PAIN LATERALITY, WITH SCIATICA PRESENCE UNSPECIFIED: ICD-10-CM

## 2017-11-02 DIAGNOSIS — R25.3 MUSCLE TWITCHING: ICD-10-CM

## 2017-11-02 DIAGNOSIS — Z11.59 NEED FOR HEPATITIS C SCREENING TEST: ICD-10-CM

## 2017-11-02 DIAGNOSIS — Z98.890 POSTOPERATIVE HYPOXIA: ICD-10-CM

## 2017-11-02 DIAGNOSIS — G89.29 CHRONIC LOW BACK PAIN, UNSPECIFIED BACK PAIN LATERALITY, WITH SCIATICA PRESENCE UNSPECIFIED: ICD-10-CM

## 2017-11-02 DIAGNOSIS — F41.9 ANXIETY: ICD-10-CM

## 2017-11-02 DIAGNOSIS — L65.8 FEMALE PATTERN HAIR LOSS: ICD-10-CM

## 2017-11-02 PROCEDURE — G0472 HEP C SCREEN HIGH RISK/OTHER: HCPCS | Performed by: PHYSICIAN ASSISTANT

## 2017-11-02 PROCEDURE — 84443 ASSAY THYROID STIM HORMONE: CPT | Performed by: PHYSICIAN ASSISTANT

## 2017-11-02 PROCEDURE — 99214 OFFICE O/P EST MOD 30 MIN: CPT | Performed by: PHYSICIAN ASSISTANT

## 2017-11-02 PROCEDURE — 36415 COLL VENOUS BLD VENIPUNCTURE: CPT | Performed by: PHYSICIAN ASSISTANT

## 2017-11-02 RX ORDER — CLONAZEPAM 1 MG/1
1-2 TABLET ORAL
Qty: 45 TABLET | Refills: 1 | Status: CANCELLED | OUTPATIENT
Start: 2017-11-02

## 2017-11-02 NOTE — MR AVS SNAPSHOT
After Visit Summary   11/2/2017    Patricia Smith    MRN: [id number]           Patient Information     Date Of Birth          1960        Visit Information        Provider Department      11/2/2017 2:00 PM Tresa Best PA-C [facility]        Today's Diagnoses     Thumb pain, left    -  1    Postoperative hypoxia        Female pattern hair loss        Anxiety        Need for hepatitis C screening test          Care Instructions    See ortho hand specialist if hand bothers too much.  May need MRI.  Decided you wanted to hold off for now.      Hair loss - decided to check thyroid but didn't want to consider treatment with rogaine or similar    Anxiety - discussed could add buspar to your zoloft, or try switching from zoloft.  Gabapentin should be helping anxiety as well.  AllegraD can contribute to anxiety.  Consider counseling.    Low oxygen after surgery - no further work up needed at this time, be aware of it for future surgeries and make sure all surgeries are at a hospital not a freestanding surgical center            Follow-ups after your visit        Your next 10 appointments already scheduled     Dec 28, 2017 11:00 AM CST   [facility]    [address] [address] [phone]              Who to contact     If you have questions or need follow up information about today's clinic visit or your schedule please contact [facility] directly at [phone].  Normal or non-critical lab and imaging results will be communicated to you by MyChart, letter or phone within 4 business days after the clinic has received the results. If you do not hear from us within 7 days, please contact the clinic through [facility] or phone. If you have a critical or abnormal lab result, we will notify you by phone as soon as possible.  Submit refill requests through [email or web address]  "or call your pharmacy and they will forward the refill request to us. Please allow 3 business days for your refill to be completed.          Additional Information About Your Visit        [employer] Information     [employer] gives you secure access to your electronic health record. If you see a primary care provider, you can also send messages to your care team and make appointments. If you have questions, please call your primary care clinic.  If you do not have a primary care provider, please call [phone] and they will assist you.        Care EveryWhere ID     This is your Care EveryWhere ID. This could be used by other organizations to access your [place] medical records  [id number]        Your Vitals Were     Pulse Temperature Height Last Period BMI (Body Mass Index)       83 98.1  F (36.7  C) (Tympanic) 5' 1\" (1.549 m) 02/24/1993 34.96 kg/m2        Blood Pressure from Last 3 Encounters:   11/02/17 125/78   09/29/17 114/69   09/14/17 100/62    Weight from Last 3 Encounters:   11/02/17 185 lb (83.9 kg)   09/29/17 185 lb 3 oz (84 kg)   09/14/17 178 lb 6.4 oz (80.9 kg)              We Performed the Following     Hepatitis C Screen Reflex to HCV RNA Quant and Genotype     TSH with free T4 reflex          Today's Medication Changes          These changes are accurate as of: 11/2/17  2:43 PM.  If you have any questions, ask your nurse or doctor.               These medicines have changed or have updated prescriptions.        Dose/Directions    fluticasone-salmeterol 45-21 MCG/ACT inhaler   Commonly known as:  ADVAIR-HFA   This may have changed:    - when to take this  - reasons to take this   Used for:  Mild intermittent asthma without complication        Dose:  2 puff   Inhale 2 puffs into the lungs 2 times daily   Quantity:  12 g   Refills:  1         Stop taking these medicines if you haven't already. Please contact your care team if you have questions.     hydrOXYzine 25 MG tablet   Commonly known as:  ATARAX "   Stopped by:  Tresa Best PA-C           oxyCODONE-acetaminophen 5-325 MG per tablet   Commonly known as:  PERCOCET   Stopped by:  Tresa Best PA-C                    Primary Care Provider Office Phone # Fax #    Tresa Best PA-C [phone] [address] [facility] [address]        Equal Access to Services     KATTY HINTON : Hadii aad ku hadasho Soomaali, waaxda luqadaha, qaybta kaalmada adeegyada, waxay idiin hayaan adeeg gurpreet laselvinn . So [facility] [phone].    ATENCIÓN: Si alexys heredia, tiene a dillon disposición servicios gratuitos de asistencia lingüística. Llame al [phone].    We comply with applicable federal civil rights laws and [place] laws. We do not discriminate on the basis of race, color, national origin, age, disability, sex, sexual orientation, or gender identity.            Thank you!     Thank you for choosing [facility]  for your care. Our goal is always to provide you with excellent care. Hearing back from our patients is one way we can continue to improve our services. Please take a few minutes to complete the written survey that you may receive in the mail after your visit with us. Thank you!             Your Updated Medication List - Protect others around you: Learn how to safely use, store and throw away your medicines at www.disposemymeds.org.          This list is accurate as of: 11/2/17  2:43 PM.  Always use your most recent med list.                   Brand Name Dispense Instructions for use Diagnosis    albuterol 108 (90 BASE) MCG/ACT Inhaler    PROAIR HFA/PROVENTIL HFA/VENTOLIN HFA    1 Inhaler    Inhale 2 puffs into the lungs every 6 hours    Mild intermittent asthma without complication       aspirin  MG EC tablet     20 tablet    Take 1 tablet (325 mg) by mouth daily    Post-op pain       baclofen 20 MG tablet    LIORESAL    60 tablet    Take 1 tablet (20 mg) by mouth 2 times daily    Chronic low  back pain, unspecified back pain laterality, with sciatica presence unspecified, Muscle twitching       clonazePAM 1 MG tablet    klonoPIN    45 tablet    Take 1-2 tablets (1-2 mg) by mouth nightly as needed for anxiety Trying to reduce amount of clonazepam and see if we can get you off it.  Currently taking 1.5 mg per day.  Refill not more than monthly.    Chronic low back pain, unspecified back pain laterality, with sciatica presence unspecified, Muscle twitching       cyanocobalamin 1000 MCG tablet    vitamin  B-12     Take 1,000 mcg by mouth daily        fexofenadine-pseudoePHEDrine  MG per 12 hr tablet    ALLEGRA-D    60 tablet    Take 1 tablet by mouth 2 times daily    Allergic rhinitis due to pollen, unspecified chronicity, unspecified seasonality       fluticasone-salmeterol 45-21 MCG/ACT inhaler    ADVAIR-HFA    12 g    Inhale 2 puffs into the lungs 2 times daily    Mild intermittent asthma without complication       furosemide 40 MG tablet    LASIX    90 tablet    TAKE ONE TABLET BY MOUTH EVERY DAY    Peripheral edema       gabapentin 300 MG capsule    NEURONTIN    270 capsule    3 caps morning and 4 caps evening.    Chronic low back pain, unspecified back pain laterality, with sciatica presence unspecified, Muscle twitching       meloxicam 15 MG tablet    MOBIC    90 tablet    TAKE ONE TABLET BY MOUTH EVERY DAY    Chronic low back pain, unspecified back pain laterality, with sciatica presence unspecified       MULTI FOR HER 50+ Caps           omeprazole 20 MG CR capsule    priLOSEC    90 capsule    Take 1 capsule (20 mg) by mouth daily    Gastroesophageal reflux disease, esophagitis presence not specified       * order for DME     1 Device    Equipment being ordered: ankle, air, stirrup, universal    Sprain of left ankle, unspecified ligament, initial encounter       * order for DME     1 Units    Equipment being ordered: Other: Roll About scooter Treatment Diagnosis: L ankle surgery    S/P ankle  ligament repair       sertraline 100 MG tablet    ZOLOFT    45 tablet    Take 1 tablet (100 mg) by mouth daily    Anxiety, Major depressive disorder, recurrent episode, mild (H)       Vitamin D-3 1000 UNITS Caps      Take 5,000 Units by mouth daily        * Notice:  This list has 2 medication(s) that are the same as other medications prescribed for you. Read the directions carefully, and ask your doctor or other care provider to review them with you.

## 2017-11-02 NOTE — PATIENT INSTRUCTIONS
See ortho hand specialist if hand bothers too much.  May need MRI.  Decided you wanted to hold off for now.      Hair loss - decided to check thyroid but didn't want to consider treatment with rogaine or similar    Anxiety - discussed could add buspar to your zoloft, or try switching from zoloft.  Gabapentin should be helping anxiety as well.  AllegraD can contribute to anxiety.  Consider counseling.    Low oxygen after surgery - no further work up needed at this time, be aware of it for future surgeries and make sure all surgeries are at a hospital not a freestanding surgical center

## 2017-11-02 NOTE — PROGRESS NOTES
"  SUBJECTIVE:   Patricia Smith is a 57 year old female who presents to clinic today for the following health issues:      Musculoskeletal problem/pain      Duration: 7 months    Description  Location: Left thumb    Intensity:  mild    Accompanying signs and symptoms: Pain, thinks she dislocated her thumb.  Sometimes goes \"whacky\" and has to push it back    History  Previous similar problem: no   Previous evaluation:  none    Precipitating or alleviating factors:  Trauma or overuse: no   Aggravating factors include: none    Therapies tried and outcome: nothing  Got thumb caught in shopping care 7 mo ago.  Pain happens with using crutches, sometimes with unspecified grabbing, unsure of positions.  When hurts, she puts pressure on area and it feels better within 30 seconds or so.  R handed.    * Hair loss-  Feels she has been losing a lot of hair lately  Notes a lot of hair loss with combing hair or in shower.  X 1 mo?  Non focal loss.  No itch or dry skin.    * Post-op- did see the ortho surgeon for post op check.    Had hypoxia postop, ? Bronchospasm v CHS v aspiration.  Echo and CXR were ok.  She attributes to having skipped lasix that morning.      Anxiety is a bit high.  Worries about this as she tapers off her clonazepam.  On zoloft 100 for yrs.  On gabapentin 900 morning and 1200 qhs.  On albuterol rarely.  On Allegra (fexofenadine) D regularly.    Problem list and histories reviewed & adjusted, as indicated.  Additional history: as documented    BP Readings from Last 3 Encounters:   11/02/17 125/78   09/29/17 114/69   09/14/17 100/62    Wt Readings from Last 3 Encounters:   11/02/17 185 lb (83.9 kg)   09/29/17 185 lb 3 oz (84 kg)   09/14/17 178 lb 6.4 oz (80.9 kg)        Labs reviewed in EPIC      Reviewed and updated as needed this visit by clinical staffTobacco  Allergies  Meds  Problems  Med Hx  Surg Hx  Fam Hx  Soc Hx        Reviewed and updated as needed this visit by Provider  Tobacco  Allergies  " "Meds  Problems  Med Hx  Surg Hx  Fam Hx  Soc Hx          ROS:  Constitutional, cardiovascular, pulmonary, skin, endocrine and psych systems are negative, except as otherwise noted.    OBJECTIVE:   /78 (BP Location: Right arm, Patient Position: Chair, Cuff Size: Adult Large)  Pulse 83  Temp 98.1  F (36.7  C) (Tympanic)  Ht 5' 1\" (1.549 m)  Wt 185 lb (83.9 kg)  LMP 02/24/1993  BMI 34.96 kg/m2  Body mass index is 34.96 kg/(m^2).  GENERAL: healthy, alert and no distress  SKIN: hair loss is frontal and over top of head, otherwise normal hair without breakage or crusting  MS: L thumb grossly normal, no laxity/gamekeeper thumb, normal ROM, normal strength.  Area in question to her is at carpal-metacarpal joint  ASSESSMENT/PLAN:       ICD-10-CM    1. Thumb pain, left M79.645    2. Postoperative hypoxia R09.02     Z98.890    3. Female pattern hair loss L65.8 TSH with free T4 reflex   4. Anxiety F41.9    5. Need for hepatitis C screening test Z11.59 Hepatitis C Screen Reflex to HCV RNA Quant and Genotype       Patient Instructions   See ortho hand specialist if hand bothers too much.  May need MRI.  Decided you wanted to hold off for now.      Hair loss - decided to check thyroid but didn't want to consider treatment with rogaine or similar    Anxiety - discussed could add buspar to your zoloft, or try switching from zoloft.  Gabapentin should be helping anxiety as well.  AllegraD can contribute to anxiety.  Consider counseling.    Low oxygen after surgery - no further work up needed at this time, be aware of it for future surgeries and make sure all surgeries are at a hospital not a freestanding surgical center        Tresa Best PA-C  [facility]  "

## 2017-11-02 NOTE — NURSING NOTE
"Chief Complaint   Patient presents with     Hair/Scalp Problem     Thumb Discomfort     Surgical Followup     Left ankle       Initial /78 (BP Location: Right arm, Patient Position: Chair, Cuff Size: Adult Large)  Pulse 83  Temp 98.1  F (36.7  C) (Tympanic)  Ht 5' 1\" (1.549 m)  Wt 185 lb (83.9 kg)  LMP 02/24/1993  BMI 34.96 kg/m2 Estimated body mass index is 34.96 kg/(m^2) as calculated from the following:    Height as of this encounter: 5' 1\" (1.549 m).    Weight as of this encounter: 185 lb (83.9 kg).  Medication Reconciliation: complete    Health Maintenance that is potentially due pending provider review:  NONE        Is there anyone who you would like to be able to receive your results? No  If yes have patient fill out GALLO      "

## 2017-11-03 LAB
HCV AB SERPL QL IA: NONREACTIVE
TSH SERPL DL<=0.005 MIU/L-ACNC: 3.88 MU/L (ref 0.4–4)

## 2017-11-03 NOTE — TELEPHONE ENCOUNTER
I spoke to Patricia today and let her know that Dr Alaniz Denied her request for Klonopin. She had asked the patient to wean off the medication. Nancy HERNANDEZ RN

## 2017-11-03 NOTE — TELEPHONE ENCOUNTER
Dr Alaniz Denied the request with statement- pt was to wean off.  (was not routed back to staff and notify patient nor the pharmacy.)    Left message on answering machine for patient to call back.  And let know Dr Alaniz denied the refill request.    Thelma Ahumada RN  [facility]

## 2017-11-03 NOTE — PROGRESS NOTES
Donna Gomez,    Your results were all normal.  Thyroid is not cause of hair loss.  You do not have hepatitis C.    Please call clinic at [phone] if you have questions.    Tresa Best PA-C

## 2017-11-03 NOTE — TELEPHONE ENCOUNTER
Message from Irist:  Original authorizing provider: MD Patricia Mckeon would like a refill of the following medications:  clonazePAM (KLONOPIN) 1 MG tablet [Leonor Alaniz MD]    Preferred pharmacy: [facility], [place] - [address] [place] [address]    Comment:  Could you please let me know whats going on? Thank you

## 2017-11-13 DIAGNOSIS — G89.29 CHRONIC LOW BACK PAIN, UNSPECIFIED BACK PAIN LATERALITY, WITH SCIATICA PRESENCE UNSPECIFIED: ICD-10-CM

## 2017-11-13 DIAGNOSIS — M54.5 CHRONIC LOW BACK PAIN, UNSPECIFIED BACK PAIN LATERALITY, WITH SCIATICA PRESENCE UNSPECIFIED: ICD-10-CM

## 2017-11-13 DIAGNOSIS — R25.3 MUSCLE TWITCHING: ICD-10-CM

## 2017-11-14 RX ORDER — BACLOFEN 20 MG/1
20 TABLET ORAL 2 TIMES DAILY
Qty: 60 TABLET | Refills: 1 | Status: SHIPPED | OUTPATIENT
Start: 2017-11-14 | End: 2018-01-10

## 2017-11-16 ENCOUNTER — TRANSFERRED RECORDS (OUTPATIENT)
Dept: HEALTH INFORMATION MANAGEMENT | Facility: CLINIC | Age: 57
End: 2017-11-16

## 2017-11-20 ENCOUNTER — MYC MEDICAL ADVICE (OUTPATIENT)
Dept: FAMILY MEDICINE | Facility: CLINIC | Age: 57
End: 2017-11-20

## 2017-11-21 ENCOUNTER — ANESTHESIA EVENT (OUTPATIENT)
Dept: GASTROENTEROLOGY | Facility: CLINIC | Age: 57
End: 2017-11-21
Payer: MEDICARE

## 2017-11-21 ASSESSMENT — LIFESTYLE VARIABLES: TOBACCO_USE: 1

## 2017-11-21 NOTE — ANESTHESIA PREPROCEDURE EVALUATION
Anesthesia Evaluation     . Pt has had prior anesthetic. Type: General, MAC and Regional           ROS/MED HX    ENT/Pulmonary:     (+)allergic rhinitis, tobacco use, Past use Intermittent asthma , . .    Neurologic:       Cardiovascular:     (+) --CAD, -past MI,-. : . . . :. . Previous cardiac testing Echodate:9-2017results:Interpretation Summary     Technically difficult study.     Left ventricular systolic function is normal.The visual ejection fraction is  estimated at 55-60%.  The right ventricular systolic function is normal.  Pulmonary hypertension- RVSP 39 mm hg +RA.  _____________________________________________________________________________  __        Left Ventricle  The left ventricle is normal in size. There is normal left ventricular wall  thickness. Left ventricular systolic function is normal. The visual ejection  fraction is estimated at 55-60%. No regional wall motion abnormalities noted.     Right Ventricle  The right ventricle is normal size. The right ventricular systolic function is  normal.     Atria  The left atrium is mildly dilated. Right atrium not well visualized.     Mitral Valve  There is trace mitral regurgitation.        Tricuspid Valve  There is trace to mild tricuspid regurgitation. The right ventricular systolic  pressure is approximated at 38.5 mmHg plus the right atrial pressure.  Pulmonary hypertension.     Aortic Valve  The aortic valve is not well visualized. aortic valve sclerosis. No aortic  stenosis is present.     Pulmonic Valve  The pulmonic valve is not well visualized. There is no pulmonic valvular  stenosis.     Vessels  The aortic root is normal size. Normal size ascending aorta. The IVC is normal  in size and reactivity with respiration, suggesting normal central venous  pressure.     Pericardium  There is no pericardial effusion.        Rhythm  Sinus rhythm was noted.date: results:ECG reviewed date:9-2017 results:Sinus Rhythm   WITHIN NORMAL LIMITS   date:  results:          METS/Exercise Tolerance:     Hematologic:     (+) History of Transfusion Other Hematologic Disorder-leukocytosis      Musculoskeletal:   (+) , , other musculoskeletal- cervical and lumbosacral DJD      GI/Hepatic:     (+) GERD       Renal/Genitourinary:         Endo:     (+) thyroid problem hypothyroidism, Obesity, .      Psychiatric:     (+) psychiatric history depression and anxiety      Infectious Disease:         Malignancy:         Other:                     Physical Exam  Normal systems: cardiovascular, pulmonary and dental    Airway   Mallampati: II  TM distance: >3 FB  Neck ROM: full    Dental     Cardiovascular       Pulmonary                     Anesthesia Plan      History & Physical Review  History and physical reviewed and following examination; no interval change.    ASA Status:  3 .    NPO Status:  > 8 hours    Plan for MAC Reason for MAC:  Other - see comments  PONV prophylaxis:  Ondansetron (or other 5HT-3) and Dexamethasone or Solumedrol       Postoperative Care  Postoperative pain management:  IV analgesics and Oral pain medications.      Consents  Anesthetic plan, risks, benefits and alternatives discussed with:  Patient..                          .

## 2017-11-22 ENCOUNTER — HOSPITAL ENCOUNTER (OUTPATIENT)
Facility: CLINIC | Age: 57
Discharge: HOME | End: 2017-11-22
Attending: SURGERY | Admitting: SURGERY
Payer: MEDICARE

## 2017-11-22 ENCOUNTER — SURGERY (OUTPATIENT)
Age: 57
End: 2017-11-22

## 2017-11-22 ENCOUNTER — MYC MEDICAL ADVICE (OUTPATIENT)
Dept: FAMILY MEDICINE | Facility: CLINIC | Age: 57
End: 2017-11-22

## 2017-11-22 ENCOUNTER — ANESTHESIA (OUTPATIENT)
Dept: GASTROENTEROLOGY | Facility: CLINIC | Age: 57
End: 2017-11-22
Payer: MEDICARE

## 2017-11-22 VITALS
RESPIRATION RATE: 16 BRPM | OXYGEN SATURATION: 97 % | DIASTOLIC BLOOD PRESSURE: 70 MMHG | TEMPERATURE: 98.1 F | SYSTOLIC BLOOD PRESSURE: 124 MMHG | HEIGHT: 61 IN | BODY MASS INDEX: 34.93 KG/M2 | WEIGHT: 185 LBS

## 2017-11-22 LAB — UPPER GI ENDOSCOPY: NORMAL

## 2017-11-22 PROCEDURE — 25000128 H RX IP 250 OP 636: Performed by: NURSE ANESTHETIST, CERTIFIED REGISTERED

## 2017-11-22 PROCEDURE — 43235 EGD DIAGNOSTIC BRUSH WASH: CPT | Performed by: SURGERY

## 2017-11-22 PROCEDURE — 25000125 ZZHC RX 250: Performed by: SURGERY

## 2017-11-22 PROCEDURE — 37000008 ZZH ANESTHESIA TECHNICAL FEE, 1ST 30 MIN: Performed by: SURGERY

## 2017-11-22 PROCEDURE — 25000128 H RX IP 250 OP 636: Performed by: SURGERY

## 2017-11-22 RX ORDER — SODIUM CHLORIDE, SODIUM LACTATE, POTASSIUM CHLORIDE, CALCIUM CHLORIDE 600; 310; 30; 20 MG/100ML; MG/100ML; MG/100ML; MG/100ML
INJECTION, SOLUTION INTRAVENOUS CONTINUOUS
OUTPATIENT
Start: 2017-11-22

## 2017-11-22 RX ORDER — ONDANSETRON 2 MG/ML
4 INJECTION INTRAMUSCULAR; INTRAVENOUS
Status: DISCONTINUED | OUTPATIENT
Start: 2017-11-22 | End: 2017-11-22 | Stop reason: HOSPADM

## 2017-11-22 RX ORDER — PROPOFOL 10 MG/ML
INJECTION, EMULSION INTRAVENOUS PRN
Status: DISCONTINUED | OUTPATIENT
Start: 2017-11-22 | End: 2017-11-22

## 2017-11-22 RX ORDER — PROPOFOL 10 MG/ML
INJECTION, EMULSION INTRAVENOUS CONTINUOUS PRN
Status: DISCONTINUED | OUTPATIENT
Start: 2017-11-22 | End: 2017-11-22

## 2017-11-22 RX ORDER — LIDOCAINE 40 MG/G
CREAM TOPICAL
OUTPATIENT
Start: 2017-11-22

## 2017-11-22 RX ORDER — LIDOCAINE 40 MG/G
CREAM TOPICAL
Status: DISCONTINUED | OUTPATIENT
Start: 2017-11-22 | End: 2017-11-22 | Stop reason: HOSPADM

## 2017-11-22 RX ORDER — SODIUM CHLORIDE, SODIUM LACTATE, POTASSIUM CHLORIDE, CALCIUM CHLORIDE 600; 310; 30; 20 MG/100ML; MG/100ML; MG/100ML; MG/100ML
INJECTION, SOLUTION INTRAVENOUS CONTINUOUS
Status: DISCONTINUED | OUTPATIENT
Start: 2017-11-22 | End: 2017-11-22 | Stop reason: HOSPADM

## 2017-11-22 RX ORDER — ONDANSETRON 2 MG/ML
4 INJECTION INTRAMUSCULAR; INTRAVENOUS
OUTPATIENT
Start: 2017-11-22

## 2017-11-22 RX ADMIN — PROPOFOL 200 MCG/KG/MIN: 10 INJECTION, EMULSION INTRAVENOUS at 08:48

## 2017-11-22 RX ADMIN — LIDOCAINE HYDROCHLORIDE 100 MG: 10 INJECTION, SOLUTION EPIDURAL; INFILTRATION; INTRACAUDAL; PERINEURAL at 08:48

## 2017-11-22 RX ADMIN — SODIUM CHLORIDE, POTASSIUM CHLORIDE, SODIUM LACTATE AND CALCIUM CHLORIDE: 600; 310; 30; 20 INJECTION, SOLUTION INTRAVENOUS at 08:30

## 2017-11-22 RX ADMIN — PROPOFOL 50 MG: 10 INJECTION, EMULSION INTRAVENOUS at 08:48

## 2017-11-22 RX ADMIN — BENZOCAINE 3 SPRAY: 220 SPRAY, METERED PERIODONTAL at 08:49

## 2017-11-22 RX ADMIN — LIDOCAINE HYDROCHLORIDE 1 ML: 10 INJECTION, SOLUTION EPIDURAL; INFILTRATION; INTRACAUDAL; PERINEURAL at 08:30

## 2017-11-22 NOTE — ANESTHESIA CARE TRANSFER NOTE
Patient: Patricia Smith    Procedure(s):  gastroscopy - Wound Class: II-Clean Contaminated    Diagnosis: gastroesophageal reflux disease, esophagitis presence not specified  Diagnosis Additional Information: No value filed.    Anesthesia Type:   MAC     Note:  Airway :Room Air  Patient transferred to:Phase II  Handoff Report: Identifed the Patient, Identified the Reponsible Provider, Reviewed the pertinent medical history, Discussed the surgical course, Reviewed Intra-OP anesthesia mangement and issues during anesthesia, Set expectations for post-procedure period and Allowed opportunity for questions and acknowledgement of understanding      Vitals: (Last set prior to Anesthesia Care Transfer)    CRNA VITALS  11/22/2017 0825 - 11/22/2017 0855      11/22/2017             Pulse: 69    SpO2: 99 %                Electronically Signed By: CANDICE Zarate CRNA  November 22, 2017  8:55 AM

## 2017-11-22 NOTE — H&P
[facility]  GI Pre-Procedure History & Physical      Name: Patricia Smith MRN#: [id number]   Age: 57 year old YOB: 1960     Date of Procedure: 11/22/2017    Primary care provider: Tresa Best      Reason for Procedure:   Screening (V76.51), Esophageal reflux (530.81)    Problem List:     Patient Active Problem List   Diagnosis     Depression with anxiety     DJD (degenerative joint disease) of knee     Hypothyroidism     Esophageal reflux     Chronic back pain     Mild intermittent asthma     Anxiety     CKD (chronic kidney disease) stage 3, GFR 30-59 ml/min     Hypoxia     Leukocytosis     Old MI (myocardial infarction)       Current Outpatient Medications:      No current outpatient prescriptions on file.        Allergies:      Allergies   Allergen Reactions     Bactrim [Sulfamethoxazole W/Trimethoprim] Hives     Codeine Hives     Erythromycin GI Disturbance     Hydrocodone Hives     Lortab [Hydrocodone-Acetaminophen] Hives     Penicillins Hives        History:   Medical:  Past Medical History:   Diagnosis Date     Allergic rhinitis      Anxiety      Asthma      Constipation      Degeneration of cervical intervertebral disc      Degeneration of lumbosacral intervertebral disc      Degenerative disc disease      Depression      Depressive disorder      DJD (degenerative joint disease)      GERD (gastroesophageal reflux disease)      Hemorrhoids      History of blood transfusion     multiple for menorrhagia, last transfusion 1977.  pt reports has had HIV and hep B & C testing     Hypothyroidism      Hypoxia 10/2017    postop hypoxia, ? bronchospasm v CHS v aspiration     Leukocytosis 9/29/2017     Leukocytosis      Old MI (myocardial infarction) 10/9/2017     Pelvic fracture (H)      Tinea corporis      Surgical:  Past Surgical History:   Procedure Laterality Date     ABDOMEN SURGERY      See attached Sheet     APPENDECTOMY  1977     ARTHROSCOPY ANKLE, OPEN REPAIR LIGAMENT, COMBINED Left  "2017    Procedure: COMBINED ARTHROSCOPY ANKLE, OPEN REPAIR LIGAMENT;  Left ankle arthroscopic debridement, medial and lateral ligament repair and tendon evaluations.;  Surgeon: Pedro Mckeon DPM;  Location: [facility] OR     BACK SURGERY  , ,      C/SECTION, CLASSICAL  x3    , Classical     ENT SURGERY       HEAD & NECK SURGERY  x3     HEMORRHOIDECTOMY  2015     HYSTERECTOMY, ELI  1993     LAMINECT/DISCECTOMY, LUMBAR  2006,     Laminectomy/Discectomy Cervical     NECK SURGERY  x3     ORTHOPEDIC SURGERY       SURGICAL HISTORY OF -       bowel obstruction w/adhesions     SURGICAL HISTORY OF -  Left     bone spur shoulder     TONSILLECTOMY & ADENOIDECTOMY  1963     Family:  Family History   Problem Relation Age of Onset     DIABETES Mother      Depression Mother      Anxiety Disorder Mother      Asthma Mother      Hypertension Father      Hyperlipidemia Father      Prostate Cancer Father      Other Cancer Father      Skin     Other Cancer Sister      Ovarian     Coronary Artery Disease Brother      Coronary Artery Disease Brother      in hs 50s     Other Cancer Sister      Ovarian     Depression Sister      Anxiety Disorder Nephew      Anxiety Disorder Nephew      Asthma Sister      Thyroid Disease Sister      Depression Sister      Coronary Artery Disease Brother      Breast Cancer No family hx of      Colon Cancer No family hx of      Social:  Social History   Substance Use Topics     Smoking status: Former Smoker     Packs/day: 1.00     Years: 15.00     Types: Cigarettes     Smokeless tobacco: Never Used      Comment: started at age 19.  Smoked about 15 years     Alcohol use No       Physical Exam:   Vital Signs:  /83 (Cuff Size: Adult Large)  Temp 98.1  F (36.7  C) (Oral)  Resp 16  Ht 1.549 m (5' 1\")  Wt 83.9 kg (185 lb)  LMP 1993  SpO2 96%  BMI 34.96 kg/m2  Airway assessment:  Patient is able to open mouth wide  Patient is able to stick out tongue     " Lungs:  No increased work of breathing, good air exchange, clear to auscultation bilaterally, no crackles or wheezing.   Cardiovascular:  Normal- regular rate and rhythm, normal S1 - S2, no S3 or S4, and no murmur noted.  Gastrointestinal:  Abdomen soft, non-tender.  BS normal. No masses, organomegaly.    Assessment:   Appropriately NPO.  No significant changes since H&P.    Plan:   Moderate (conscious) sedation     General and specific risks of the procedure of the procedure including pain, bleeding, and perforation were discussed. Possibility of missing lesions discussed. Prep and recovery were discussed. She asked appropriate questions and provided consent.      Patient's active problems diagnostically and therapeutically optimized for the planned procedure. Risks, benefits, alternatives to sedation and blood explained and consent obtained.  Risks, benefits, alternatives to procedure explained and consent obtained.    I have reviewed the history and physical, lab finding(s), diagnostic data, medications, and the plan for sedation.  I have determined this patient to be an appropriate candidate for the planned sedation/procedure and have reassessed the patient immediately prior to sedation/procedure.    Les Hartmann MD

## 2017-11-22 NOTE — ANESTHESIA POSTPROCEDURE EVALUATION
Patient: Patricia Smith    Procedure(s):  gastroscopy - Wound Class: II-Clean Contaminated    Diagnosis:gastroesophageal reflux disease, esophagitis presence not specified  Diagnosis Additional Information: No value filed.    Anesthesia Type:  MAC    Note:  Anesthesia Post Evaluation    Patient location during evaluation: Bedside  Patient participation: Able to fully participate in evaluation  Level of consciousness: awake  Pain management: adequate  Airway patency: patent  Cardiovascular status: acceptable  Respiratory status: acceptable  Hydration status: stable  PONV: none     Anesthetic complications: None          Last vitals:  Vitals:    11/22/17 0807   BP: 117/83   Resp: 16   Temp: 36.7  C (98.1  F)   SpO2: 96%         Electronically Signed By: CANDICE Zarate CRNA  November 22, 2017  8:55 AM

## 2017-11-30 ENCOUNTER — MYC MEDICAL ADVICE (OUTPATIENT)
Dept: FAMILY MEDICINE | Facility: CLINIC | Age: 57
End: 2017-11-30

## 2017-12-11 ENCOUNTER — MYC REFILL (OUTPATIENT)
Dept: FAMILY MEDICINE | Facility: CLINIC | Age: 57
End: 2017-12-11

## 2017-12-11 DIAGNOSIS — F33.0 MAJOR DEPRESSIVE DISORDER, RECURRENT EPISODE, MILD (H): ICD-10-CM

## 2017-12-11 DIAGNOSIS — F41.9 ANXIETY: ICD-10-CM

## 2017-12-11 RX ORDER — SERTRALINE HYDROCHLORIDE 100 MG/1
100 TABLET, FILM COATED ORAL DAILY
Qty: 90 TABLET | Refills: 1 | Status: SHIPPED | OUTPATIENT
Start: 2017-12-11 | End: 2018-06-20

## 2017-12-11 NOTE — TELEPHONE ENCOUNTER
Message from Beacon Readert:  Original authorizing provider: RANDELL Corona would like a refill of the following medications:  sertraline (ZOLOFT) 100 MG tablet [Tresa Best PA-C]    Preferred pharmacy: [facility], [place] - [address] [place] [address]    Comment:

## 2017-12-27 ENCOUNTER — MYC MEDICAL ADVICE (OUTPATIENT)
Dept: FAMILY MEDICINE | Facility: CLINIC | Age: 57
End: 2017-12-27

## 2017-12-30 ENCOUNTER — MYC MEDICAL ADVICE (OUTPATIENT)
Dept: NEUROLOGY | Facility: CLINIC | Age: 57
End: 2017-12-30

## 2018-01-01 NOTE — ANESTHESIA POSTPROCEDURE EVALUATION
Patient: Patricia Smtih    Procedure(s):  Left ankle arthroscopic debridement, medial and lateral ligament repair and tendon evaluations. - Wound Class: I-Clean    Diagnosis:Left ankle instability, impingement  Diagnosis Additional Information: No value filed.    Anesthesia Type:  General, Periph. Nerve Block for postop pain, ETT, LMA    Note:  Anesthesia Post Evaluation    Patient location during evaluation: Phase 2  Patient participation: Able to fully participate in evaluation  Level of consciousness: awake and alert  Pain management: adequate  Airway patency: patent  Cardiovascular status: acceptable and hemodynamically stable  Respiratory status: acceptable, room air and spontaneous ventilation  Hydration status: acceptable  PONV: none     Anesthetic complications: None          Last vitals:  Vitals:    09/28/17 1840 09/28/17 1845 09/28/17 1900   BP: (!) 151/94 93/47 154/83   Pulse:      Resp: 8 11 13   Temp: 36.8  C (98.2  F)  36.8  C (98.3  F)   SpO2: 99% 91% 98%         Electronically Signed By: CANDICE Juarez CRNA  September 28, 2017  7:35 PM   yes

## 2018-01-10 ENCOUNTER — OFFICE VISIT (OUTPATIENT)
Dept: FAMILY MEDICINE | Facility: CLINIC | Age: 58
End: 2018-01-10
Payer: MEDICARE

## 2018-01-10 VITALS
SYSTOLIC BLOOD PRESSURE: 120 MMHG | RESPIRATION RATE: 20 BRPM | HEART RATE: 76 BPM | WEIGHT: 175.8 LBS | TEMPERATURE: 97.9 F | HEIGHT: 61 IN | DIASTOLIC BLOOD PRESSURE: 74 MMHG | BODY MASS INDEX: 33.19 KG/M2

## 2018-01-10 DIAGNOSIS — M54.5 CHRONIC LOW BACK PAIN, UNSPECIFIED BACK PAIN LATERALITY, WITH SCIATICA PRESENCE UNSPECIFIED: ICD-10-CM

## 2018-01-10 DIAGNOSIS — J45.20 MILD INTERMITTENT ASTHMA WITHOUT COMPLICATION: Chronic | ICD-10-CM

## 2018-01-10 DIAGNOSIS — R79.89 ELEVATED TSH: ICD-10-CM

## 2018-01-10 DIAGNOSIS — J01.90 ACUTE SINUSITIS WITH SYMPTOMS > 10 DAYS: Primary | ICD-10-CM

## 2018-01-10 DIAGNOSIS — G89.29 CHRONIC LOW BACK PAIN, UNSPECIFIED BACK PAIN LATERALITY, WITH SCIATICA PRESENCE UNSPECIFIED: ICD-10-CM

## 2018-01-10 DIAGNOSIS — G25.81 RESTLESS LEGS SYNDROME (RLS): ICD-10-CM

## 2018-01-10 DIAGNOSIS — N18.30 CKD (CHRONIC KIDNEY DISEASE) STAGE 3, GFR 30-59 ML/MIN (H): Chronic | ICD-10-CM

## 2018-01-10 DIAGNOSIS — F41.8 DEPRESSION WITH ANXIETY: Chronic | ICD-10-CM

## 2018-01-10 DIAGNOSIS — Z86.2 HISTORY OF ANEMIA: ICD-10-CM

## 2018-01-10 DIAGNOSIS — R25.3 MUSCLE TWITCHING: ICD-10-CM

## 2018-01-10 PROCEDURE — 36415 COLL VENOUS BLD VENIPUNCTURE: CPT | Performed by: PHYSICIAN ASSISTANT

## 2018-01-10 PROCEDURE — 84443 ASSAY THYROID STIM HORMONE: CPT | Performed by: PHYSICIAN ASSISTANT

## 2018-01-10 PROCEDURE — 83735 ASSAY OF MAGNESIUM: CPT | Performed by: PHYSICIAN ASSISTANT

## 2018-01-10 PROCEDURE — 82306 VITAMIN D 25 HYDROXY: CPT | Performed by: PHYSICIAN ASSISTANT

## 2018-01-10 PROCEDURE — 83540 ASSAY OF IRON: CPT | Performed by: PHYSICIAN ASSISTANT

## 2018-01-10 PROCEDURE — 83550 IRON BINDING TEST: CPT | Performed by: PHYSICIAN ASSISTANT

## 2018-01-10 PROCEDURE — 84439 ASSAY OF FREE THYROXINE: CPT | Performed by: PHYSICIAN ASSISTANT

## 2018-01-10 PROCEDURE — 80053 COMPREHEN METABOLIC PANEL: CPT | Performed by: PHYSICIAN ASSISTANT

## 2018-01-10 PROCEDURE — 99214 OFFICE O/P EST MOD 30 MIN: CPT | Performed by: PHYSICIAN ASSISTANT

## 2018-01-10 PROCEDURE — 82728 ASSAY OF FERRITIN: CPT | Performed by: PHYSICIAN ASSISTANT

## 2018-01-10 RX ORDER — BACLOFEN 20 MG/1
20 TABLET ORAL 2 TIMES DAILY
Qty: 60 TABLET | Refills: 1 | Status: SHIPPED | OUTPATIENT
Start: 2018-01-10 | End: 2018-02-20

## 2018-01-10 RX ORDER — ROPINIROLE 0.25 MG/1
.25-.5 TABLET, FILM COATED ORAL AT BEDTIME
Qty: 60 TABLET | Refills: 1 | Status: SHIPPED | OUTPATIENT
Start: 2018-01-10 | End: 2018-04-06

## 2018-01-10 RX ORDER — DOXYCYCLINE 100 MG/1
100 CAPSULE ORAL 2 TIMES DAILY
Qty: 20 CAPSULE | Refills: 0 | Status: SHIPPED | OUTPATIENT
Start: 2018-01-10 | End: 2018-04-06

## 2018-01-10 ASSESSMENT — ANXIETY QUESTIONNAIRES
IF YOU CHECKED OFF ANY PROBLEMS ON THIS QUESTIONNAIRE, HOW DIFFICULT HAVE THESE PROBLEMS MADE IT FOR YOU TO DO YOUR WORK, TAKE CARE OF THINGS AT HOME, OR GET ALONG WITH OTHER PEOPLE: NOT DIFFICULT AT ALL
6. BECOMING EASILY ANNOYED OR IRRITABLE: SEVERAL DAYS
5. BEING SO RESTLESS THAT IT IS HARD TO SIT STILL: SEVERAL DAYS
GAD7 TOTAL SCORE: 6
7. FEELING AFRAID AS IF SOMETHING AWFUL MIGHT HAPPEN: NOT AT ALL
1. FEELING NERVOUS, ANXIOUS, OR ON EDGE: SEVERAL DAYS
3. WORRYING TOO MUCH ABOUT DIFFERENT THINGS: SEVERAL DAYS
2. NOT BEING ABLE TO STOP OR CONTROL WORRYING: SEVERAL DAYS

## 2018-01-10 ASSESSMENT — PAIN SCALES - GENERAL: PAINLEVEL: SEVERE PAIN (6)

## 2018-01-10 ASSESSMENT — PATIENT HEALTH QUESTIONNAIRE - PHQ9
5. POOR APPETITE OR OVEREATING: SEVERAL DAYS
SUM OF ALL RESPONSES TO PHQ QUESTIONS 1-9: 10

## 2018-01-10 NOTE — LETTER
My Depression Action Plan  Name: Patricia Smith   Date of Birth 1960  Date: 1/10/2018    My doctor: Tresa Best   My clinic: [address] [id number] [phone]          GREEN    ZONE   Good Control    What it looks like:     Things are going generally well. You have normal up s and down s. You may even feel depressed from time to time, but bad moods usually last less than a day.   What you need to do:  1. Continue to care for yourself (see self care plan)  2. Check your depression survival kit and update it as needed  3. Follow your physician s recommendations including any medication.  4. Do not stop taking medication unless you consult with your physician first.           YELLOW         ZONE Getting Worse    What it looks like:     Depression is starting to interfere with your life.     It may be hard to get out of bed; you may be starting to isolate yourself from others.    Symptoms of depression are starting to last most all day and this has happened for several days.     You may have suicidal thoughts but they are not constant.   What you need to do:     1. Call your care team, your response to treatment will improve if you keep your care team informed of your progress. Yellow periods are signs an adjustment may need to be made.     2. Continue your self-care, even if you have to fake it!    3. Talk to someone in your support network    4. Open up your depression survival kit           RED    ZONE Medical Alert - Get Help    What it looks like:     Depression is seriously interfering with your life.     You may experience these or other symptoms: You can t get out of bed most days, can t work or engage in other necessary activities, you have trouble taking care of basic hygiene, or basic responsibilities, thoughts of suicide or death that will not go away, self-injurious behavior.     What you need to do:  1. Call your care team  and request a same-day appointment. If they are not available (weekends or after hours) call your local crisis line, emergency room or 911.      Electronically signed by: Leonora Duarte, January 10, 2018    Depression Self Care Plan / Survival Kit    Self-Care for Depression  Here s the deal. Your body and mind are really not as separate as most people think.  What you do and think affects how you feel and how you feel influences what you do and think. This means if you do things that people who feel good do, it will help you feel better.  Sometimes this is all it takes.  There is also a place for medication and therapy depending on how severe your depression is, so be sure to consult with your medical provider and/ or Behavioral Health Consultant if your symptoms are worsening or not improving.     In order to better manage my stress, I will:    Exercise  Get some form of exercise, every day. This will help reduce pain and release endorphins, the  feel good  chemicals in your brain. This is almost as good as taking antidepressants!  This is not the same as joining a gym and then never going! (they count on that by the way ) It can be as simple as just going for a walk or doing some gardening, anything that will get you moving.      Hygiene   Maintain good hygiene (Get out of bed in the morning, Make your bed, Brush your teeth, Take a shower, and Get dressed like you were going to work, even if you are unemployed).  If your clothes don't fit try to get ones that do.    Diet  I will strive to eat foods that are good for me, drink plenty of water, and avoid excessive sugar, caffeine, alcohol, and other mood-altering substances.  Some foods that are helpful in depression are: complex carbohydrates, B vitamins, flaxseed, fish or fish oil, fresh fruits and vegetables.    Psychotherapy  I agree to participate in Individual Therapy (if recommended).    Medication  If prescribed medications, I agree to take them.  Missing  doses can result in serious side effects.  I understand that drinking alcohol, or other illicit drug use, may cause potential side effects.  I will not stop my medication abruptly without first discussing it with my provider.    Staying Connected With Others  I will stay in touch with my friends, family members, and my primary care provider/team.    Use your imagination  Be creative.  We all have a creative side; it doesn t matter if it s oil painting, sand castles, or mud pies! This will also kick up the endorphins.    Witness Beauty  (AKA stop and smell the roses) Take a look outside, even in mid-winter. Notice colors, textures. Watch the squirrels and birds.     Service to others  Be of service to others.  There is always someone else in need.  By helping others we can  get out of ourselves  and remember the really important things.  This also provides opportunities for practicing all the other parts of the program.    Humor  Laugh and be silly!  Adjust your TV habits for less news and crime-drama and more comedy.    Control your stress  Try breathing deep, massage therapy, biofeedback, and meditation. Find time to relax each day.     My support system    Clinic Contact:  Phone number:    Contact 1:  Phone number:    Contact 2:  Phone number:    Mandaeism/:  Phone number:    Therapist:  Phone number:    Local crisis center:    Phone number:    Other community support:  Phone number:

## 2018-01-10 NOTE — MR AVS SNAPSHOT
After Visit Summary   1/10/2018    Patricia Smith    MRN: [id number]           Patient Information     Date Of Birth          1960        Visit Information        Provider Department      1/10/2018 11:20 AM Tresa Best PA-C [facility]        Today's Diagnoses     Acute sinusitis with symptoms > 10 days    -  1    Depression with anxiety        Restless legs syndrome (RLS)        CKD (chronic kidney disease) stage 3, GFR 30-59 ml/min        History of anemia        Mild intermittent asthma without complication          Care Instructions    Restless legs -  Try new med  Labs today     Anxiety -  See counselor  If they don't call you, you call!    Sinuses -  Antibiotics prescribed    Recheck as needed          Follow-ups after your visit        Additional Services     MENTAL HEALTH REFERRAL  - Adult; Outpatient Treatment; Individual/Couples/Family/Group Therapy/Health Psychology; Other: [facility] [phone]; We will contact you to schedule the appointment or please call with any questi...       All scheduling is subject to the client's specific insurance plan & benefits, provider/location availability, and provider clinical specialities.  Please arrive 15 minutes early for your first appointment and bring your completed paperwork.    Please be aware that coverage of these services is subject to the terms and limitations of your health insurance plan.  Call member services at your health plan with any benefit or coverage questions.                            Who to contact     If you have questions or need follow up information about today's clinic visit or your schedule please contact [facility] directly at [phone].  Normal or non-critical lab and imaging results will be communicated to you by MyChart, letter or phone within 4 business days after the clinic has received the results. If you do not hear from us within 7 days,  "please contact the clinic through [facility] or phone. If you have a critical or abnormal lab result, we will notify you by phone as soon as possible.  Submit refill requests through [facility] or call your pharmacy and they will forward the refill request to us. Please allow 3 business days for your refill to be completed.          Additional Information About Your Visit        [employer] Information     [facility] gives you secure access to your electronic health record. If you see a primary care provider, you can also send messages to your care team and make appointments. If you have questions, please call your primary care clinic.  If you do not have a primary care provider, please call [phone] and they will assist you.        Care EveryWhere ID     This is your Care EveryWhere ID. This could be used by other organizations to access your [facility] medical records  [id number]        Your Vitals Were     Pulse Temperature Respirations Height Last Period Breastfeeding?    76 97.9  F (36.6  C) (Tympanic) 20 5' 1.25\" (1.556 m) 02/24/1993 No    BMI (Body Mass Index)                   32.95 kg/m2            Blood Pressure from Last 3 Encounters:   01/10/18 120/74   11/22/17 124/70   11/02/17 125/78    Weight from Last 3 Encounters:   01/10/18 175 lb 12.8 oz (79.7 kg)   11/22/17 185 lb (83.9 kg)   11/02/17 185 lb (83.9 kg)              We Performed the Following     Asthma Action Plan (AAP)     Comprehensive metabolic panel     DEPRESSION ACTION PLAN (DAP)     Ferritin     Iron and iron binding capacity     Magnesium     MENTAL HEALTH REFERRAL  - Adult; Outpatient Treatment; Individual/Couples/Family/Group Therapy/Health Psychology; Other: [facility] [phone]; We will contact you to schedule the appointment or please call with any questi...     TSH with free T4 reflex     Vitamin D Deficiency          Today's Medication Changes          These changes are accurate as of: 1/10/18 12:02 PM.  If you " have any questions, ask your nurse or doctor.               Start taking these medicines.        Dose/Directions    doxycycline 100 MG capsule   Commonly known as:  VIBRAMYCIN   Used for:  Acute sinusitis with symptoms > 10 days   Started by:  Tresa Best PA-C        Dose:  100 mg   Take 1 capsule (100 mg) by mouth 2 times daily   Quantity:  20 capsule   Refills:  0       rOPINIRole 0.25 MG tablet   Commonly known as:  REQUIP   Used for:  Restless legs syndrome (RLS)   Started by:  Tresa Best PA-C        Dose:  0.25-0.5 mg   Take 1-2 tablets (0.25-0.5 mg) by mouth At Bedtime   Quantity:  60 tablet   Refills:  1         These medicines have changed or have updated prescriptions.        Dose/Directions    fluticasone-salmeterol 45-21 MCG/ACT inhaler   Commonly known as:  ADVAIR-HFA   This may have changed:    - when to take this  - reasons to take this   Used for:  Mild intermittent asthma without complication        Dose:  2 puff   Inhale 2 puffs into the lungs 2 times daily   Quantity:  12 g   Refills:  1            Where to get your medicines      These medications were sent to [facility] [place] [address]     Phone:  [phone]     doxycycline 100 MG capsule    rOPINIRole 0.25 MG tablet                Primary Care Provider Office Phone # Fax #    Tresa Best PA-C [phone]       [address] [place], [place] [address]        Equal Access to Services     KATTY HINTON : Casey damono Soricki, waaxda luqadaha, qaybta kaalmada tonyyaestefany, gay luna. So [facility] [phone].    ATENCIÓN: Si habla giovanna, tiene a dillon disposición servicios gratuitos de asistencia lingüística. Llame al [phone].    We comply with applicable federal civil rights laws and [place] laws. We do not discriminate on the basis of race, color, national origin, age, disability,  sex, sexual orientation, or gender identity.            Thank you!     Thank you for choosing [facility]  for your care. Our goal is always to provide you with excellent care. Hearing back from our patients is one way we can continue to improve our services. Please take a few minutes to complete the written survey that you may receive in the mail after your visit with us. Thank you!             Your Updated Medication List - Protect others around you: Learn how to safely use, store and throw away your medicines at www.disposemymeds.org.          This list is accurate as of: 1/10/18 12:02 PM.  Always use your most recent med list.                   Brand Name Dispense Instructions for use Diagnosis    albuterol 108 (90 BASE) MCG/ACT Inhaler    PROAIR HFA/PROVENTIL HFA/VENTOLIN HFA    1 Inhaler    Inhale 2 puffs into the lungs every 6 hours    Mild intermittent asthma without complication       baclofen 20 MG tablet    LIORESAL    60 tablet    Take 1 tablet (20 mg) by mouth 2 times daily    Chronic low back pain, unspecified back pain laterality, with sciatica presence unspecified, Muscle twitching       cyanocobalamin 1000 MCG tablet    vitamin  B-12     Take 1,000 mcg by mouth daily        doxycycline 100 MG capsule    VIBRAMYCIN    20 capsule    Take 1 capsule (100 mg) by mouth 2 times daily    Acute sinusitis with symptoms > 10 days       fexofenadine-pseudoePHEDrine  MG per 12 hr tablet    ALLEGRA-D    60 tablet    Take 1 tablet by mouth 2 times daily    Allergic rhinitis due to pollen, unspecified chronicity, unspecified seasonality       fluticasone-salmeterol 45-21 MCG/ACT inhaler    ADVAIR-HFA    12 g    Inhale 2 puffs into the lungs 2 times daily    Mild intermittent asthma without complication       furosemide 40 MG tablet    LASIX    90 tablet    TAKE ONE TABLET BY MOUTH EVERY DAY    Peripheral edema       gabapentin 300 MG capsule    NEURONTIN    270 capsule    3 caps morning and  4 caps evening.    Chronic low back pain, unspecified back pain laterality, with sciatica presence unspecified, Muscle twitching       meloxicam 15 MG tablet    MOBIC    90 tablet    TAKE ONE TABLET BY MOUTH EVERY DAY    Chronic low back pain, unspecified back pain laterality, with sciatica presence unspecified       MULTI FOR HER 50+ Caps           omeprazole 20 MG CR capsule    priLOSEC    90 capsule    Take 1 capsule (20 mg) by mouth daily    Gastroesophageal reflux disease, esophagitis presence not specified       rOPINIRole 0.25 MG tablet    REQUIP    60 tablet    Take 1-2 tablets (0.25-0.5 mg) by mouth At Bedtime    Restless legs syndrome (RLS)       sertraline 100 MG tablet    ZOLOFT    90 tablet    Take 1 tablet (100 mg) by mouth daily    Anxiety, Major depressive disorder, recurrent episode, mild (H)       Vitamin D-3 1000 UNITS Caps      Take 5,000 Units by mouth daily

## 2018-01-10 NOTE — PATIENT INSTRUCTIONS
Restless legs -  Try new med  Labs today     Anxiety -  See counselor  If they don't call you, you call!    Sinuses -  Antibiotics prescribed    Recheck as needed

## 2018-01-10 NOTE — NURSING NOTE
"Chief Complaint   Patient presents with     Anxiety     States has had anxiety and legs feels nervous and jumpy since stopping Klonapin        Initial /74 (BP Location: Right arm, Patient Position: Chair, Cuff Size: Adult Large)  Pulse 76  Temp 97.9  F (36.6  C) (Tympanic)  Resp 20  Ht 5' 1.25\" (1.556 m)  Wt 175 lb 12.8 oz (79.7 kg)  LMP 02/24/1993  Breastfeeding? No  BMI 32.95 kg/m2 Estimated body mass index is 32.95 kg/(m^2) as calculated from the following:    Height as of this encounter: 5' 1.25\" (1.556 m).    Weight as of this encounter: 175 lb 12.8 oz (79.7 kg).  Medication Reconciliation: complete    Health Maintenance that is potentially due pending provider review:  Colonoscopy/FIT, PHQ9, GAD7 and ACT    Possibly completing today per provider review.    Is there anyone who you would like to be able to receive your results? No  If yes have patient fill out GALLO  Leonora Duarte CMA    "

## 2018-01-10 NOTE — LETTER
My Asthma Action Plan  Name: Patricia Smith   YOB: 1960  Date: 1/10/2018   My doctor: Tresa Best PA-C   My clinic: [facility]        My Control Medicine: Advair- 2 puffs twice a day as needed  My Rescue Medicine: Albuterol (Proair/Ventolin/Proventil) inhaler 2 puffs every 4-6 hours as needed   My Asthma Severity: intermittent  Avoid your asthma triggers: allergies               GREEN ZONE   Good Control    I feel good    No cough or wheeze    Can work, sleep and play without asthma symptoms       Take your asthma control medicine every day.     1. If exercise triggers your asthma, take your rescue medication    15 minutes before exercise or sports, and    During exercise if you have asthma symptoms  2. Spacer to use with inhaler: If you have a spacer, make sure to use it with your inhaler             YELLOW ZONE Getting Worse  I have ANY of these:    I do not feel good    Cough or wheeze    Chest feels tight    Wake up at night   1. Keep taking your Green Zone medications  2. Start taking your rescue medicine:    every 20 minutes for up to 1 hour. Then every 4 hours for 24-48 hours.  3. If you stay in the Yellow Zone for more than 12-24 hours, contact your doctor.  4. If you do not return to the Green Zone in 12-24 hours or you get worse, start taking your oral steroid medicine if prescribed by your provider.           RED ZONE Medical Alert - Get Help  I have ANY of these:    I feel awful    Medicine is not helping    Breathing getting harder    Trouble walking or talking    Nose opens wide to breathe       1. Take your rescue medicine NOW  2. If your provider has prescribed an oral steroid medicine, start taking it NOW  3. Call your doctor NOW  4. If you are still in the Red Zone after 20 minutes and you have not reached your doctor:    Take your rescue medicine again and    Call 911 or go to the emergency room right away    See your regular doctor within 2 weeks of an  Emergency Room or Urgent Care visit for follow-up treatment.        Electronically signed by: Leonora Duarte, January 10, 2018    Annual Reminders:  Meet with Asthma Educator,  Flu Shot in the Fall, consider Pneumonia Vaccination for patients with asthma (aged 19 and older).    Pharmacy: [facility] [address] [place] [address]                    Asthma Triggers  How To Control Things That Make Your Asthma Worse    Triggers are things that make your asthma worse.  Look at the list below to help you find your triggers and what you can do about them.  You can help prevent asthma flare-ups by staying away from your triggers.      Trigger                                                          What you can do   Cigarette Smoke  Tobacco smoke can make asthma worse. Do not allow smoking in your home, car or around you.  Be sure no one smokes at a child s day care or school.  If you smoke, ask your health care provider for ways to help you quit.  Ask family members to quit too.  Ask your health care provider for a referral to Quit Plan to help you quit smoking, or call 8-526-196-PLAN.     Colds, Flu, Bronchitis  These are common triggers of asthma. Wash your hands often.  Don t touch your eyes, nose or mouth.  Get a flu shot every year.     Dust Mites  These are tiny bugs that live in cloth or carpet. They are too small to see. Wash sheets and blankets in hot water every week.   Encase pillows and mattress in dust mite proof covers.  Avoid having carpet if you can. If you have carpet, vacuum weekly.   Use a dust mask and HEPA vacuum.   Pollen and Outdoor Mold  Some people are allergic to trees, grass, or weed pollen, or molds. Try to keep your windows closed.  Limit time out doors when pollen count is high.   Ask you health care provider about taking medicine during allergy season.     Animal Dander  Some people are allergic to skin flakes, urine or saliva from pets with fur or feathers. Keep  pets with fur or feathers out of your home.    If you can t keep the pet outdoors, then keep the pet out of your bedroom.  Keep the bedroom door closed.  Keep pets off cloth furniture and away from stuffed toys.     Mice, Rats, and Cockroaches  Some people are allergic to the waste from these pests.   Cover food and garbage.  Clean up spills and food crumbs.  Store grease in the refrigerator.   Keep food out of the bedroom.   Indoor Mold  This can be a trigger if your home has high moisture. Fix leaking faucets, pipes, or other sources of water.   Clean moldy surfaces.  Dehumidify basement if it is damp and smelly.   Smoke, Strong Odors, and Sprays  These can reduce air quality. Stay away from strong odors and sprays, such as perfume, powder, hair spray, paints, smoke incense, paint, cleaning products, candles and new carpet.   Exercise or Sports  Some people with asthma have this trigger. Be active!  Ask your doctor about taking medicine before sports or exercise to prevent symptoms.    Warm up for 5-10 minutes before and after sports or exercise.     Other Triggers of Asthma  Cold air:  Cover your nose and mouth with a scarf.  Sometimes laughing or crying can be a trigger.  Some medicines and food can trigger asthma.

## 2018-01-10 NOTE — PROGRESS NOTES
SUBJECTIVE:   Patricia Smith is a 57 year old female who presents to clinic today for the following health issues:    Depression and Anxiety Follow-Up    Status since last visit: Worsened since stopping Klonapin    Other associated symptoms:jumping in legs    Complicating factors:     Significant life event: No     Current substance abuse: None    PHQ-9 Score and MyChart F/U Questions 3/13/2017 9/14/2017 1/10/2018   Total Score 6 9 10   Q9: Suicide Ideation Not at all Not at all Not at all     LOIDA-7 SCORE 3/13/2017 9/14/2017 1/10/2018   Total Score - - -   Total Score 7 5 6     Finds worrying about stuff she hadn't worried about in a long time    RLS - on gababpentin 900-1200 tid  Past meds - clonazepam, none others    PHQ-9  English  PHQ-9   Any Language  GAD7  Suicide Assessment Five-step Evaluation and Treatment (SAFE-T)      Amount of exercise or physical activity: None    Problems taking medications regularly: No    Medication side effects: none    Diet: regular (no restrictions)    ENT Symptoms             Symptoms: cc Present Absent Comment   Fever/Chills  x     Fatigue  x     Muscle Aches  x     Eye Irritation  x     Sneezing   x    Nasal Darshan/Drg  x     Sinus Pressure/Pain  x     Loss of smell   x    Dental pain  x     Sore Throat   x    Swollen Glands   x    Ear Pain/Fullness  x     Cough  x     Wheeze   x    Chest Pain   x    Shortness of breath   x    Rash  x     Other  x  Asthma but under control     Symptom duration:  over 2 weeks   Symptom severity:  moderate   Treatments tried:  nyquil, tylenol pm   Contacts:       Started as severe sudden symptoms 2 wks ago and has not improved, not changed.  No fever.  History sinus infections, 2/winter.  Occasional cough.    Problem list and histories reviewed & adjusted, as indicated.  Additional history: as documented    BP Readings from Last 3 Encounters:   01/10/18 120/74   11/22/17 124/70   11/02/17 125/78    Wt Readings from Last 3 Encounters:   01/10/18 175  "lb 12.8 oz (79.7 kg)   11/22/17 185 lb (83.9 kg)   11/02/17 185 lb (83.9 kg)                  Labs reviewed in EPIC      Reviewed and updated as needed this visit by clinical staffTobacco  Allergies  Meds  Problems  Med Hx  Surg Hx  Fam Hx  Soc Hx        Reviewed and updated as needed this visit by Provider  Tobacco  Allergies  Meds  Problems  Med Hx  Surg Hx  Fam Hx  Soc Hx          ROS:  Constitutional, HEENT, cardiovascular, pulmonary, musculoskeletal, neuro, and psych systems are negative, except as otherwise noted.    OBJECTIVE:   /74 (BP Location: Right arm, Patient Position: Chair, Cuff Size: Adult Large)  Pulse 76  Temp 97.9  F (36.6  C) (Tympanic)  Resp 20  Ht 5' 1.25\" (1.556 m)  Wt 175 lb 12.8 oz (79.7 kg)  LMP 02/24/1993  Breastfeeding? No  BMI 32.95 kg/m2  Body mass index is 32.95 kg/(m^2).  GENERAL: healthy, alert and no distress  EYES: Eyes grossly normal to inspection, conjunctivae and sclerae normal  HENT: ear canals and TM's normal, nose and mouth without ulcers or lesions  NECK: no adenopathy, no asymmetry, masses, or scars  RESP: lungs clear to auscultation - no rales, rhonchi or wheezes  CV: regular rate and rhythm, normal S1 S2, no S3 or S4, no murmur, click or rub  PSYCH: mentation appears normal, affect normal/bright    Diagnostic Test Results:  none     ASSESSMENT/PLAN:       ICD-10-CM    1. Acute sinusitis with symptoms > 10 days J01.90 doxycycline (VIBRAMYCIN) 100 MG capsule   2. Depression with anxiety F41.8 TSH with free T4 reflex     MENTAL HEALTH REFERRAL  - Adult; Outpatient Treatment; Individual/Couples/Family/Group Therapy/Health Psychology; Other: [facility] [phone]; We will contact you to schedule the appointment or please call with any questi...   3. Restless legs syndrome (RLS) G25.81 Vitamin D Deficiency     TSH with free T4 reflex     Magnesium     Ferritin     Iron and iron binding capacity     Comprehensive metabolic " panel     rOPINIRole (REQUIP) 0.25 MG tablet   4. CKD (chronic kidney disease) stage 3, GFR 30-59 ml/min N18.3 Vitamin D Deficiency     TSH with free T4 reflex     Magnesium     Comprehensive metabolic panel   5. History of anemia Z86.2 Ferritin     Iron and iron binding capacity   6. Mild intermittent asthma without complication J45.20        Patient Instructions   Restless legs -  Try new med  Labs today     Anxiety -  See counselor  If they don't call you, you call!    Sinuses -  Antibiotics prescribed    Recheck as needed      Tresa Best PA-C  [facility]

## 2018-01-11 LAB
ALBUMIN SERPL-MCNC: 3.8 G/DL (ref 3.4–5)
ALP SERPL-CCNC: 83 U/L (ref 40–150)
ALT SERPL W P-5'-P-CCNC: 29 U/L (ref 0–50)
ANION GAP SERPL CALCULATED.3IONS-SCNC: 9 MMOL/L (ref 3–14)
AST SERPL W P-5'-P-CCNC: 32 U/L (ref 0–45)
BILIRUB SERPL-MCNC: 0.5 MG/DL (ref 0.2–1.3)
BUN SERPL-MCNC: 15 MG/DL (ref 7–30)
CALCIUM SERPL-MCNC: 8.7 MG/DL (ref 8.5–10.1)
CHLORIDE SERPL-SCNC: 105 MMOL/L (ref 94–109)
CO2 SERPL-SCNC: 24 MMOL/L (ref 20–32)
CREAT SERPL-MCNC: 0.84 MG/DL (ref 0.52–1.04)
DEPRECATED CALCIDIOL+CALCIFEROL SERPL-MC: 82 UG/L (ref 20–75)
FERRITIN SERPL-MCNC: 30 NG/ML (ref 8–252)
GFR SERPL CREATININE-BSD FRML MDRD: 69 ML/MIN/1.7M2
GLUCOSE SERPL-MCNC: 78 MG/DL (ref 70–99)
IRON SATN MFR SERPL: 18 % (ref 15–46)
IRON SERPL-MCNC: 67 UG/DL (ref 35–180)
MAGNESIUM SERPL-MCNC: 2.3 MG/DL (ref 1.6–2.3)
POTASSIUM SERPL-SCNC: 4.5 MMOL/L (ref 3.4–5.3)
PROT SERPL-MCNC: 7.9 G/DL (ref 6.8–8.8)
SODIUM SERPL-SCNC: 138 MMOL/L (ref 133–144)
T4 FREE SERPL-MCNC: 0.81 NG/DL (ref 0.76–1.46)
TIBC SERPL-MCNC: 375 UG/DL (ref 240–430)
TSH SERPL DL<=0.005 MIU/L-ACNC: 5.39 MU/L (ref 0.4–4)

## 2018-01-11 ASSESSMENT — ANXIETY QUESTIONNAIRES: GAD7 TOTAL SCORE: 6

## 2018-01-11 ASSESSMENT — ASTHMA QUESTIONNAIRES: ACT_TOTALSCORE: 25

## 2018-01-11 NOTE — PROGRESS NOTES
Donna Gomez,    Your vitamin D is a bit high.  Decrease your dose somewhat.    Thyroid is slightly off but can vary.  I suggest rechecking it in 2 months.  Other labs normal.    Please call clinic at [phone] if you have questions.    Tresa Best PA-C

## 2018-01-14 ENCOUNTER — APPOINTMENT (OUTPATIENT)
Dept: GENERAL RADIOLOGY | Facility: CLINIC | Age: 58
End: 2018-01-14
Attending: EMERGENCY MEDICINE
Payer: COMMERCIAL

## 2018-01-14 ENCOUNTER — HOSPITAL ENCOUNTER (EMERGENCY)
Facility: CLINIC | Age: 58
Discharge: HOME | End: 2018-01-14
Attending: EMERGENCY MEDICINE | Admitting: EMERGENCY MEDICINE
Payer: COMMERCIAL

## 2018-01-14 VITALS
DIASTOLIC BLOOD PRESSURE: 82 MMHG | SYSTOLIC BLOOD PRESSURE: 149 MMHG | RESPIRATION RATE: 16 BRPM | BODY MASS INDEX: 32.61 KG/M2 | OXYGEN SATURATION: 30 % | TEMPERATURE: 98.2 F | WEIGHT: 174 LBS
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
## 2018-01-14 DIAGNOSIS — M54.50 ACUTE MIDLINE LOW BACK PAIN WITHOUT SCIATICA: ICD-10-CM

## 2018-01-14 DIAGNOSIS — M25.512 ACUTE PAIN OF LEFT SHOULDER: ICD-10-CM

## 2018-01-14 DIAGNOSIS — M54.2 NECK PAIN: ICD-10-CM

## 2018-01-14 PROCEDURE — 99284 EMERGENCY DEPT VISIT MOD MDM: CPT | Performed by: EMERGENCY MEDICINE

## 2018-01-14 PROCEDURE — 73030 X-RAY EXAM OF SHOULDER: CPT | Mod: LT

## 2018-01-14 PROCEDURE — 99284 EMERGENCY DEPT VISIT MOD MDM: CPT | Mod: Z6 | Performed by: EMERGENCY MEDICINE

## 2018-01-14 PROCEDURE — 25000132 ZZH RX MED GY IP 250 OP 250 PS 637: Performed by: EMERGENCY MEDICINE

## 2018-01-14 PROCEDURE — 72040 X-RAY EXAM NECK SPINE 2-3 VW: CPT

## 2018-01-14 PROCEDURE — 72100 X-RAY EXAM L-S SPINE 2/3 VWS: CPT

## 2018-01-14 RX ORDER — ACETAMINOPHEN 500 MG
1000 TABLET ORAL ONCE
Status: COMPLETED | OUTPATIENT
Start: 2018-01-14 | End: 2018-01-14

## 2018-01-14 RX ADMIN — ACETAMINOPHEN 1000 MG: 500 TABLET ORAL at 18:27

## 2018-01-14 ASSESSMENT — ENCOUNTER SYMPTOMS
RESPIRATORY NEGATIVE: 1
ALLERGIC/IMMUNOLOGIC NEGATIVE: 1
PSYCHIATRIC NEGATIVE: 1
GASTROINTESTINAL NEGATIVE: 1
BACK PAIN: 1
EYES NEGATIVE: 1
CONSTITUTIONAL NEGATIVE: 1
ENDOCRINE NEGATIVE: 1
CARDIOVASCULAR NEGATIVE: 1
NEUROLOGICAL NEGATIVE: 1
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
## 2018-01-14 NOTE — ED NOTES
Pt ambulates to ED bed.  Pt denies any numbness or tingling.  Pt has HA, left sided hip pain, left sided shoulder pain, neck pain and back pain.  Pt has hx of back pain, hx of 2 surgical back procedures.

## 2018-01-14 NOTE — ED AVS SNAPSHOT
[facility]    [address] [facility] [address]    Phone:  [phone]    Fax:  [phone]                                       Patricia Smith   MRN: [id number]    Department:  [facility]   Date of Visit:  1/14/2018           After Visit Summary Signature Page     I have received my discharge instructions, and my questions have been answered. I have discussed any challenges I see with this plan with the nurse or doctor.    ..........................................................................................................................................  Patient/Patient Representative Signature      ..........................................................................................................................................  Patient Representative Print Name and Relationship to Patient    ..................................................               ................................................  Date                                            Time    ..........................................................................................................................................  Reviewed by Signature/Title    ...................................................              ..............................................  Date                                                            Time
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
## 2018-01-14 NOTE — LETTER
January 14, 2018      To Whom It May Concern:      Patricia Smith was seen in our Emergency Department today, 01/14/18.  I expect her condition to improve over the next 3-5 days.  She may return to work/school when improved.        Cordially,         Ousmane Christianson MD, FACEP

## 2018-01-15 NOTE — ED NOTES
First contact with PT.   All D/C home info given and all questions answered. PT verbalized understanding.

## 2018-01-24 DIAGNOSIS — G89.29 CHRONIC LOW BACK PAIN, UNSPECIFIED BACK PAIN LATERALITY, WITH SCIATICA PRESENCE UNSPECIFIED: ICD-10-CM

## 2018-01-24 DIAGNOSIS — M54.5 CHRONIC LOW BACK PAIN, UNSPECIFIED BACK PAIN LATERALITY, WITH SCIATICA PRESENCE UNSPECIFIED: ICD-10-CM

## 2018-01-25 NOTE — TELEPHONE ENCOUNTER
"Requested Prescriptions   Pending Prescriptions Disp Refills     meloxicam (MOBIC) 15 MG tablet [Pharmacy Med Name: MELOXICAM 15MG TABS] 90 tablet 0     Sig: TAKE ONE TABLET BY MOUTH EVERY DAY    NSAID Medications Failed    1/24/2018 10:43 PM       Failed - Blood pressure under 140/90    BP Readings from Last 3 Encounters:   01/14/18 149/82   01/10/18 120/74   11/22/17 124/70                Failed - Normal CBC on file in past 12 months    Recent Labs   Lab Test  09/29/17   1212   WBC  14.5*   RBC  4.31   HGB  12.0   HCT  37.4   PLT  238            Passed - Normal ALT on file in past 12 months    Recent Labs   Lab Test  01/10/18   1205   ALT  29            Passed - Normal AST on file in past 12 months    Recent Labs   Lab Test  01/10/18   1205   AST  32            Passed - Recent or future visit with authorizing provider's specialty    Patient had office visit in the last year or has a visit in the next 30 days with authorizing provider.  See \"Patient Info\" tab in inbasket, or \"Choose Columns\" in Meds & Orders section of the refill encounter.            Passed - Patient is age 6-64 years       Passed - No active pregnancy on record       Passed - Normal serum creatinine on file in past 12 months    Recent Labs   Lab Test  01/10/18   1205   CR  0.84            Passed - No positive pregnancy test in past 12 months        Last Written Prescription Date:  10/27/2017  Last Fill Quantity: 90,  # refills: 0   Last Office Visit with [facility] or [facility] prescribing provider:  1/10/2018   Future Office Visit:       "

## 2018-01-26 RX ORDER — MELOXICAM 15 MG/1
TABLET ORAL
Qty: 90 TABLET | Refills: 0 | Status: SHIPPED | OUTPATIENT
Start: 2018-01-26 | End: 2018-03-19

## 2018-02-12 ENCOUNTER — TRANSFERRED RECORDS (OUTPATIENT)
Dept: HEALTH INFORMATION MANAGEMENT | Facility: CLINIC | Age: 58
End: 2018-02-12

## 2018-02-20 DIAGNOSIS — R25.3 MUSCLE TWITCHING: ICD-10-CM

## 2018-02-20 DIAGNOSIS — M54.5 CHRONIC LOW BACK PAIN, UNSPECIFIED BACK PAIN LATERALITY, WITH SCIATICA PRESENCE UNSPECIFIED: ICD-10-CM

## 2018-02-20 DIAGNOSIS — G89.29 CHRONIC LOW BACK PAIN, UNSPECIFIED BACK PAIN LATERALITY, WITH SCIATICA PRESENCE UNSPECIFIED: ICD-10-CM

## 2018-02-20 RX ORDER — BACLOFEN 20 MG/1
TABLET ORAL
Qty: 60 TABLET | Refills: 1 | Status: SHIPPED | OUTPATIENT
Start: 2018-02-20 | End: 2018-04-06

## 2018-03-01 PROCEDURE — 82274 ASSAY TEST FOR BLOOD FECAL: CPT | Performed by: FAMILY MEDICINE

## 2018-03-02 ENCOUNTER — HOSPITAL ENCOUNTER (OUTPATIENT)
Dept: PHYSICAL THERAPY | Facility: CLINIC | Age: 58
Setting detail: THERAPIES SERIES
End: 2018-03-02
Attending: PODIATRIST
Payer: MEDICARE

## 2018-03-02 PROCEDURE — 40000718 ZZHC STATISTIC PT DEPARTMENT ORTHO VISIT: Performed by: PHYSICAL THERAPIST

## 2018-03-02 PROCEDURE — G8979 MOBILITY GOAL STATUS: HCPCS | Mod: GP,CI | Performed by: PHYSICAL THERAPIST

## 2018-03-02 PROCEDURE — G8978 MOBILITY CURRENT STATUS: HCPCS | Mod: GP,CK | Performed by: PHYSICAL THERAPIST

## 2018-03-02 PROCEDURE — 97110 THERAPEUTIC EXERCISES: CPT | Mod: GP | Performed by: PHYSICAL THERAPIST

## 2018-03-02 PROCEDURE — 97140 MANUAL THERAPY 1/> REGIONS: CPT | Mod: GP | Performed by: PHYSICAL THERAPIST

## 2018-03-02 PROCEDURE — 97161 PT EVAL LOW COMPLEX 20 MIN: CPT | Mod: GP | Performed by: PHYSICAL THERAPIST

## 2018-03-02 NOTE — PROGRESS NOTES
[facility]          OUTPATIENT PHYSICAL THERAPY ORTHOPEDIC EVALUATION  PLAN OF TREATMENT FOR OUTPATIENT REHABILITATION  (COMPLETE FOR INITIAL CLAIMS ONLY)  Patient's Last Name, First Name, M.I.  YOB: 1960  Patricia Smith       Provider s Name:  [facility]   Medical Record No.  [id number]   Start of Care Date:  03/02/18   Onset Date:  09/28/17   Type:     _X__PT   ___OT   ___SLP Medical Diagnosis:  Post op L Ankle Surgery     PT Diagnosis:  Decreased ROM and strength secondary to post op L ankle surgery   Visits from SOC:  1      _________________________________________________________________________________  Plan of Treatment/Functional Goals:  IADL retraining, balance training, joint mobilization, manual therapy, motor coordination training, neuromuscular re-education, ROM, strengthening, stretching, bed mobility training     Electrical stimulation     Goals  Goal Identifier: ROM  Goal Description: Pt will demonstrate atleast 0* ankle dorsiflexion AROM in order to participate with standing > 1 hour without increased ankle pain.  Target Date: 04/27/18    Goal Identifier: Walking  Goal Description: pt will demonstrate increased dorsiflexion strength of atleast 4-/5 in order to participate with walking >1 mile.  Target Date: 05/11/18    Goal Identifier: Stairs  Goal Description: pt will demonstrate increased gastroc flexibility in order to participate with descending the stairs without increased ankle pain.  Target Date: 04/27/18          Therapy Frequency:  2 times/Week  Predicted Duration of Therapy Intervention:  10 weeks    Shantell Oh, PT                 I CERTIFY THE NEED FOR THESE SERVICES FURNISHED UNDER        THIS PLAN OF TREATMENT AND WHILE UNDER MY CARE     (Physician co-signature of this document indicates review and certification of the therapy plan).                       Certification Date From:  03/02/18   Certification Date To:   05/11/18    Referring Provider:  Linda    Initial Assessment        See Epic Evaluation Start of Care Date: 03/02/18

## 2018-03-03 DIAGNOSIS — Z12.11 COLON CANCER SCREENING: ICD-10-CM

## 2018-03-03 LAB — HEMOCCULT STL QL IA: NEGATIVE

## 2018-03-03 NOTE — LETTER
March 5, 2018      Patricia Smith  [address]  [place] [address]        Dear ,    We are writing to inform you of your test results.    Your recent FIT test (colon cancer screening) was normal. Please repeat this test yearly.    Resulted Orders   Fecal colorectal cancer screen (FIT)   Result Value Ref Range    Occult Blood Scn FIT Negative NEG^Negative       If you have any questions or concerns, please call the clinic at the number listed above.       Sincerely,    Coy Conley MD/ ss

## 2018-03-12 ENCOUNTER — HOSPITAL ENCOUNTER (OUTPATIENT)
Dept: PHYSICAL THERAPY | Facility: CLINIC | Age: 58
Setting detail: THERAPIES SERIES
End: 2018-03-12
Attending: PODIATRIST
Payer: MEDICARE

## 2018-03-12 PROCEDURE — 97140 MANUAL THERAPY 1/> REGIONS: CPT | Mod: GP | Performed by: PHYSICAL THERAPIST

## 2018-03-12 PROCEDURE — 40000718 ZZHC STATISTIC PT DEPARTMENT ORTHO VISIT: Performed by: PHYSICAL THERAPIST

## 2018-03-12 PROCEDURE — 97110 THERAPEUTIC EXERCISES: CPT | Mod: GP | Performed by: PHYSICAL THERAPIST

## 2018-03-15 ENCOUNTER — HOSPITAL ENCOUNTER (OUTPATIENT)
Dept: PHYSICAL THERAPY | Facility: CLINIC | Age: 58
Setting detail: THERAPIES SERIES
End: 2018-03-15
Attending: PODIATRIST
Payer: MEDICARE

## 2018-03-15 PROCEDURE — 97110 THERAPEUTIC EXERCISES: CPT | Mod: GP | Performed by: PHYSICAL THERAPIST

## 2018-03-15 PROCEDURE — 97140 MANUAL THERAPY 1/> REGIONS: CPT | Mod: GP | Performed by: PHYSICAL THERAPIST

## 2018-03-15 PROCEDURE — 40000718 ZZHC STATISTIC PT DEPARTMENT ORTHO VISIT: Performed by: PHYSICAL THERAPIST

## 2018-03-21 ENCOUNTER — HOSPITAL ENCOUNTER (OUTPATIENT)
Dept: PHYSICAL THERAPY | Facility: CLINIC | Age: 58
Setting detail: THERAPIES SERIES
End: 2018-03-21
Attending: PODIATRIST
Payer: MEDICARE

## 2018-03-21 PROCEDURE — 97110 THERAPEUTIC EXERCISES: CPT | Mod: GP | Performed by: PHYSICAL THERAPIST

## 2018-03-21 PROCEDURE — 97140 MANUAL THERAPY 1/> REGIONS: CPT | Mod: GP | Performed by: PHYSICAL THERAPIST

## 2018-03-21 PROCEDURE — 40000718 ZZHC STATISTIC PT DEPARTMENT ORTHO VISIT: Performed by: PHYSICAL THERAPIST

## 2018-03-23 ENCOUNTER — HOSPITAL ENCOUNTER (OUTPATIENT)
Dept: PHYSICAL THERAPY | Facility: CLINIC | Age: 58
Setting detail: THERAPIES SERIES
End: 2018-03-23
Attending: PODIATRIST
Payer: MEDICARE

## 2018-03-23 PROCEDURE — 97110 THERAPEUTIC EXERCISES: CPT | Mod: GP | Performed by: PHYSICAL THERAPIST

## 2018-03-23 PROCEDURE — 40000718 ZZHC STATISTIC PT DEPARTMENT ORTHO VISIT: Performed by: PHYSICAL THERAPIST

## 2018-03-23 PROCEDURE — 97140 MANUAL THERAPY 1/> REGIONS: CPT | Mod: GP | Performed by: PHYSICAL THERAPIST

## 2018-03-24 ENCOUNTER — MYC MEDICAL ADVICE (OUTPATIENT)
Dept: FAMILY MEDICINE | Facility: CLINIC | Age: 58
End: 2018-03-24

## 2018-03-30 ENCOUNTER — HOSPITAL ENCOUNTER (OUTPATIENT)
Dept: PHYSICAL THERAPY | Facility: CLINIC | Age: 58
Setting detail: THERAPIES SERIES
End: 2018-03-30
Attending: PODIATRIST
Payer: MEDICARE

## 2018-03-30 PROCEDURE — 97110 THERAPEUTIC EXERCISES: CPT | Mod: GP | Performed by: PHYSICAL THERAPIST

## 2018-03-30 PROCEDURE — 40000718 ZZHC STATISTIC PT DEPARTMENT ORTHO VISIT: Performed by: PHYSICAL THERAPIST

## 2018-03-30 PROCEDURE — 97140 MANUAL THERAPY 1/> REGIONS: CPT | Mod: GP | Performed by: PHYSICAL THERAPIST

## 2018-04-04 ENCOUNTER — HOSPITAL ENCOUNTER (OUTPATIENT)
Dept: PHYSICAL THERAPY | Facility: CLINIC | Age: 58
Setting detail: THERAPIES SERIES
End: 2018-04-04
Attending: PODIATRIST
Payer: MEDICARE

## 2018-04-04 PROCEDURE — 40000718 ZZHC STATISTIC PT DEPARTMENT ORTHO VISIT: Performed by: PHYSICAL THERAPIST

## 2018-04-04 PROCEDURE — 97110 THERAPEUTIC EXERCISES: CPT | Mod: GP | Performed by: PHYSICAL THERAPIST

## 2018-04-04 PROCEDURE — 97140 MANUAL THERAPY 1/> REGIONS: CPT | Mod: GP | Performed by: PHYSICAL THERAPIST

## 2018-04-06 ENCOUNTER — OFFICE VISIT (OUTPATIENT)
Dept: FAMILY MEDICINE | Facility: CLINIC | Age: 58
End: 2018-04-06
Payer: MEDICARE

## 2018-04-06 VITALS
HEART RATE: 67 BPM | SYSTOLIC BLOOD PRESSURE: 113 MMHG | TEMPERATURE: 97.3 F | BODY MASS INDEX: 32.89 KG/M2 | HEIGHT: 61 IN | RESPIRATION RATE: 16 BRPM | WEIGHT: 174.2 LBS | DIASTOLIC BLOOD PRESSURE: 65 MMHG

## 2018-04-06 DIAGNOSIS — R25.3 MUSCLE TWITCHING: ICD-10-CM

## 2018-04-06 DIAGNOSIS — J45.30 MILD PERSISTENT ASTHMA WITHOUT COMPLICATION: ICD-10-CM

## 2018-04-06 DIAGNOSIS — G89.29 CHRONIC LOW BACK PAIN, UNSPECIFIED BACK PAIN LATERALITY, WITH SCIATICA PRESENCE UNSPECIFIED: ICD-10-CM

## 2018-04-06 DIAGNOSIS — N18.30 CKD (CHRONIC KIDNEY DISEASE) STAGE 3, GFR 30-59 ML/MIN (H): ICD-10-CM

## 2018-04-06 DIAGNOSIS — M54.5 CHRONIC LOW BACK PAIN, UNSPECIFIED BACK PAIN LATERALITY, WITH SCIATICA PRESENCE UNSPECIFIED: ICD-10-CM

## 2018-04-06 DIAGNOSIS — F41.9 ANXIETY: ICD-10-CM

## 2018-04-06 DIAGNOSIS — K21.9 GASTROESOPHAGEAL REFLUX DISEASE, ESOPHAGITIS PRESENCE NOT SPECIFIED: ICD-10-CM

## 2018-04-06 DIAGNOSIS — G25.81 RESTLESS LEGS SYNDROME (RLS): ICD-10-CM

## 2018-04-06 DIAGNOSIS — F33.0 MAJOR DEPRESSIVE DISORDER, RECURRENT EPISODE, MILD (H): ICD-10-CM

## 2018-04-06 DIAGNOSIS — K08.89 PAIN, DENTAL: Primary | ICD-10-CM

## 2018-04-06 PROCEDURE — 99214 OFFICE O/P EST MOD 30 MIN: CPT | Performed by: PHYSICIAN ASSISTANT

## 2018-04-06 RX ORDER — FLUTICASONE PROPIONATE AND SALMETEROL XINAFOATE 45; 21 UG/1; UG/1
2 AEROSOL, METERED RESPIRATORY (INHALATION) 2 TIMES DAILY PRN
Qty: 1 INHALER | Refills: 11 | Status: SHIPPED | OUTPATIENT
Start: 2018-04-06 | End: 2018-10-05

## 2018-04-06 RX ORDER — MELOXICAM 15 MG/1
15 TABLET ORAL DAILY
Qty: 90 TABLET | Refills: 1 | Status: SHIPPED | OUTPATIENT
Start: 2018-04-06 | End: 2018-06-20

## 2018-04-06 RX ORDER — GABAPENTIN 300 MG/1
CAPSULE ORAL
Qty: 270 CAPSULE | Refills: 5 | Status: SHIPPED | OUTPATIENT
Start: 2018-04-06 | End: 2018-06-22

## 2018-04-06 RX ORDER — ROPINIROLE 0.25 MG/1
.25-.5 TABLET, FILM COATED ORAL AT BEDTIME
Qty: 60 TABLET | Refills: 11 | Status: SHIPPED | OUTPATIENT
Start: 2018-04-06 | End: 2018-10-05

## 2018-04-06 RX ORDER — CLINDAMYCIN HCL 300 MG
300 CAPSULE ORAL 3 TIMES DAILY
Qty: 21 CAPSULE | Refills: 0 | Status: SHIPPED | OUTPATIENT
Start: 2018-04-06 | End: 2018-04-13

## 2018-04-06 RX ORDER — ALBUTEROL SULFATE 90 UG/1
2 AEROSOL, METERED RESPIRATORY (INHALATION) EVERY 6 HOURS
Qty: 1 INHALER | Refills: 3 | Status: SHIPPED | OUTPATIENT
Start: 2018-04-06 | End: 2018-10-05

## 2018-04-06 RX ORDER — BACLOFEN 20 MG/1
20 TABLET ORAL 2 TIMES DAILY
Qty: 60 TABLET | Refills: 11 | Status: SHIPPED | OUTPATIENT
Start: 2018-04-06 | End: 2018-10-05

## 2018-04-06 RX ORDER — SERTRALINE HYDROCHLORIDE 100 MG/1
100 TABLET, FILM COATED ORAL DAILY
Qty: 90 TABLET | Refills: 1 | Status: CANCELLED | OUTPATIENT
Start: 2018-04-06

## 2018-04-06 ASSESSMENT — ANXIETY QUESTIONNAIRES
4. TROUBLE RELAXING: SEVERAL DAYS
GAD7 TOTAL SCORE: 7
3. WORRYING TOO MUCH ABOUT DIFFERENT THINGS: MORE THAN HALF THE DAYS
6. BECOMING EASILY ANNOYED OR IRRITABLE: NOT AT ALL
7. FEELING AFRAID AS IF SOMETHING AWFUL MIGHT HAPPEN: NOT AT ALL
GAD7 TOTAL SCORE: 7
7. FEELING AFRAID AS IF SOMETHING AWFUL MIGHT HAPPEN: NOT AT ALL
1. FEELING NERVOUS, ANXIOUS, OR ON EDGE: MORE THAN HALF THE DAYS
5. BEING SO RESTLESS THAT IT IS HARD TO SIT STILL: NOT AT ALL
GAD7 TOTAL SCORE: 7
2. NOT BEING ABLE TO STOP OR CONTROL WORRYING: MORE THAN HALF THE DAYS

## 2018-04-06 ASSESSMENT — PATIENT HEALTH QUESTIONNAIRE - PHQ9
SUM OF ALL RESPONSES TO PHQ QUESTIONS 1-9: 15
10. IF YOU CHECKED OFF ANY PROBLEMS, HOW DIFFICULT HAVE THESE PROBLEMS MADE IT FOR YOU TO DO YOUR WORK, TAKE CARE OF THINGS AT HOME, OR GET ALONG WITH OTHER PEOPLE: VERY DIFFICULT
SUM OF ALL RESPONSES TO PHQ QUESTIONS 1-9: 15

## 2018-04-06 NOTE — PATIENT INSTRUCTIONS
Treat for possible tooth infection  If worsening, this is dental or emergency room concern.  Ultimately need tooth pulled.    Perhaps start savings account or switch to better dental insurance.    Heartburn - discussed in future perhaps again try reducing omeprazole, perhaps alternating days with ranitidine    Asthma - doing well    Mood - will try switching to cymbalta which could also help pain  Discussed with kidneys, prefer to reduce or get rid of meloxicam if possible  Currently on sertraline 100.  To switch: decrease sertraline to 50 mg x 1 wk, then can stop sertraline and start cymbalta (duloxetine) 30 mg.  If not feeling well with switch, call me.  Otherwise recheck 4 wks.      [facility] ~ [phone]  One Day Weekly- Alternating Days    [place] ~ [phone]  Every other Monday or Wednesday   & one Saturday morning a month    [place] ~ [phone]  Every Other Monday Morning    [place] ~ [phone]  Every Other Monday Afternoon    [place] ~ [phone]  Every Monday morning  Every Tuesday afternoon       Wed, Thurs, Friday morning & afternoon

## 2018-04-06 NOTE — MR AVS SNAPSHOT
After Visit Summary   4/6/2018    Patricia Smith    MRN: [id number]           Patient Information     Date Of Birth          1960        Visit Information        Provider Department      4/6/2018 9:00 AM Tresa Best PA-C [facility]        Today's Diagnoses     Pain, dental    -  1    Major depressive disorder, recurrent episode, mild (H)        Chronic low back pain, unspecified back pain laterality, with sciatica presence unspecified        CKD (chronic kidney disease) stage 3, GFR 30-59 ml/min        Muscle twitching        Gastroesophageal reflux disease, esophagitis presence not specified        Anxiety        Restless legs syndrome (RLS)        Mild intermittent asthma without complication          Care Instructions    Treat for possible tooth infection  If worsening, this is dental or emergency room concern.  Ultimately need tooth pulled.    Perhaps start savings account or switch to better dental insurance.    Heartburn - discussed in future perhaps again try reducing omeprazole, perhaps alternating days with ranitidine    Asthma - doing well    Mood - will try switching to cymbalta which could also help pain  Discussed with kidneys, prefer to reduce or get rid of meloxicam if possible  Currently on sertraline 100.  To switch: decrease sertraline to 50 mg x 1 wk, then can stop sertraline and start cymbalta (duloxetine) 30 mg.  If not feeling well with switch, call me.  Otherwise recheck 4 wks.      [facility] ~ [phone]  One Day Weekly- Alternating Days    [place] ~ [phone]  Every other Monday or Wednesday   & one Saturday morning a month    [place] ~ [phone]  Every Other Monday Morning    [place] ~ [phone]  Every Other Monday Afternoon    [place] ~ [phone]  Every Monday morning  Every Tuesday afternoon       Wed, Thurs, Friday morning & afternoon            Follow-ups after your visit        Your next  "10 appointments already scheduled     Apr 11, 2018  1:00 PM CDT   Ortho Treatment with Shantell Oh PT   [facility] ([facility])    [address] [place] [id number] [phone]              Who to contact     If you have questions or need follow up information about today's clinic visit or your schedule please contact [facility] directly at [phone].  Normal or non-critical lab and imaging results will be communicated to you by [employer], letter or phone within 4 business days after the clinic has received the results. If you do not hear from us within 7 days, please contact the clinic through [employer] or phone. If you have a critical or abnormal lab result, we will notify you by phone as soon as possible.  Submit refill requests through evly or call your pharmacy and they will forward the refill request to us. Please allow 3 business days for your refill to be completed.          Additional Information About Your Visit        evly Information     evly gives you secure access to your electronic health record. If you see a primary care provider, you can also send messages to your care team and make appointments. If you have questions, please call your primary care clinic.  If you do not have a primary care provider, please call [phone] and they will assist you.        Care EveryWhere ID     This is your Care EveryWhere ID. This could be used by other organizations to access your [facility] medical records  [id number]        Your Vitals Were     Pulse Temperature Respirations Height Last Period BMI (Body Mass Index)    67 97.3  F (36.3  C) (Tympanic) 16 5' 1.25\" (1.556 m) 02/24/1993 32.65 kg/m2       Blood Pressure from Last 3 Encounters:   04/06/18 113/65   01/14/18 149/82   01/10/18 120/74    Weight from Last 3 Encounters:   04/06/18 174 lb 3.2 oz (79 kg)   01/14/18 174 lb (78.9 kg)   01/10/18 175 lb 12.8 oz (79.7 kg)    "           Today, you had the following     No orders found for display         Today's Medication Changes          These changes are accurate as of 4/6/18  9:57 AM.  If you have any questions, ask your nurse or doctor.               Start taking these medicines.        Dose/Directions    clindamycin 300 MG capsule   Commonly known as:  CLEOCIN   Used for:  Pain, dental   Started by:  Tresa Best PA-C        Dose:  300 mg   Take 1 capsule (300 mg) by mouth 3 times daily for 7 days   Quantity:  21 capsule   Refills:  0         These medicines have changed or have updated prescriptions.        Dose/Directions    baclofen 20 MG tablet   Commonly known as:  LIORESAL   This may have changed:  See the new instructions.   Used for:  Chronic low back pain, unspecified back pain laterality, with sciatica presence unspecified, Muscle twitching   Changed by:  Tresa Best PA-C        Dose:  20 mg   Take 1 tablet (20 mg) by mouth 2 times daily   Quantity:  60 tablet   Refills:  11       meloxicam 15 MG tablet   Commonly known as:  MOBIC   This may have changed:  See the new instructions.   Used for:  Chronic low back pain, unspecified back pain laterality, with sciatica presence unspecified   Changed by:  Tresa Best PA-C        Dose:  15 mg   Take 1 tablet (15 mg) by mouth daily   Quantity:  90 tablet   Refills:  1         Stop taking these medicines if you haven't already. Please contact your care team if you have questions.     doxycycline 100 MG capsule   Commonly known as:  VIBRAMYCIN   Stopped by:  Tresa Best PA-C                Where to get your medicines      These medications were sent to [facility] [address] [place] [address] [address] [facility]  [address] [facility] [address]     Phone:  [phone]     albuterol 108 (90 BASE) MCG/ACT Inhaler    baclofen 20 MG tablet    clindamycin 300 MG capsule    fluticasone-salmeterol 45-21 MCG/ACT inhaler    gabapentin  300 MG capsule    meloxicam 15 MG tablet    omeprazole 20 MG CR capsule    rOPINIRole 0.25 MG tablet                Primary Care Provider Office Phone # Fax #    Tresa Best PA-C [phone] [address] [place] [id number]        Equal Access to Services     KATTY HINTON : Hadii aad ku hadasho Soomaali, waaxda luqadaha, qaybta kaalmada adeegyada, waxmykel fosterin yeen aderobby vázquez marco antonio luna. So [facility] [phone].    ATENCIÓN: Si habla español, tiene a dillon disposición servicios gratuitos de asistencia lingüística. [facility] [phone].    We comply with applicable federal civil rights laws and [place] laws. We do not discriminate on the basis of race, color, national origin, age, disability, sex, sexual orientation, or gender identity.            Thank you!     Thank you for choosing [facility]  for your care. Our goal is always to provide you with excellent care. Hearing back from our patients is one way we can continue to improve our services. Please take a few minutes to complete the written survey that you may receive in the mail after your visit with us. Thank you!             Your Updated Medication List - Protect others around you: Learn how to safely use, store and throw away your medicines at www.disposemymeds.org.          This list is accurate as of 4/6/18  9:57 AM.  Always use your most recent med list.                   Brand Name Dispense Instructions for use Diagnosis    albuterol 108 (90 BASE) MCG/ACT Inhaler    PROAIR HFA/PROVENTIL HFA/VENTOLIN HFA    1 Inhaler    Inhale 2 puffs into the lungs every 6 hours    Mild intermittent asthma without complication       baclofen 20 MG tablet    LIORESAL    60 tablet    Take 1 tablet (20 mg) by mouth 2 times daily    Chronic low back pain, unspecified back pain laterality, with sciatica presence unspecified, Muscle twitching       clindamycin 300 MG capsule    CLEOCIN    21 capsule    Take 1 capsule (300 mg)  by mouth 3 times daily for 7 days    Pain, dental       cyanocobalamin 1000 MCG tablet    vitamin  B-12     Take 1,000 mcg by mouth daily        fexofenadine-pseudoePHEDrine  MG per 12 hr tablet    ALLEGRA-D    60 tablet    Take 1 tablet by mouth 2 times daily    Allergic rhinitis due to pollen, unspecified chronicity, unspecified seasonality       fluticasone-salmeterol 45-21 MCG/ACT inhaler    ADVAIR-HFA    1 Inhaler    Inhale 2 puffs into the lungs 2 times daily as needed    Mild intermittent asthma without complication       furosemide 40 MG tablet    LASIX    90 tablet    TAKE ONE TABLET BY MOUTH EVERY DAY    Peripheral edema       gabapentin 300 MG capsule    NEURONTIN    270 capsule    3 caps morning and 4 caps evening.    Chronic low back pain, unspecified back pain laterality, with sciatica presence unspecified, Muscle twitching       meloxicam 15 MG tablet    MOBIC    90 tablet    Take 1 tablet (15 mg) by mouth daily    Chronic low back pain, unspecified back pain laterality, with sciatica presence unspecified       MULTI FOR HER 50+ Caps           omeprazole 20 MG CR capsule    priLOSEC    90 capsule    Take 1 capsule (20 mg) by mouth daily    Gastroesophageal reflux disease, esophagitis presence not specified       rOPINIRole 0.25 MG tablet    REQUIP    60 tablet    Take 1-2 tablets (0.25-0.5 mg) by mouth At Bedtime    Restless legs syndrome (RLS)       sertraline 100 MG tablet    ZOLOFT    90 tablet    Take 1 tablet (100 mg) by mouth daily    Anxiety, Major depressive disorder, recurrent episode, mild (H)       Vitamin D-3 1000 UNITS Caps      Take 5,000 Units by mouth daily

## 2018-04-06 NOTE — PROGRESS NOTES
"  SUBJECTIVE:   Patricia Smith is a 58 year old female who presents to clinic today for the following health issues:      * Tooth Pain-  Has a possibly infected tooth that has been bothering her.  Lower right. Would like an antibiotic. Has not seen the dentist and doesn't have a future appointment made    Tooth broken for years  No insurance coverage   Was told by dentist needs to be done by oral surgeon due to how low off it is broken  No fevers.  Symptoms since before 3/24.    Asthma - doing great.  Pavan ACT 25.  Uses advair spring and fall.     Depression - struggling a bit, has to move again - lives with father but he will be transitioning to assisted living.  [place] moving to [place] or [place].   Past meds - wellbutrin, prozac, paxil.   Chronic insomnia.  Since clonazepam discontinued.  Mind races at night.    RLS - gabapentin, requip - doing well.    GERD - doing well on omeprazole 20.  Chronic.  Has tried ranitidine in past.    Problem list and histories reviewed & adjusted, as indicated.  Additional history: as documented    BP Readings from Last 3 Encounters:   04/06/18 113/65   01/14/18 149/82   01/10/18 120/74    Wt Readings from Last 3 Encounters:   04/06/18 174 lb 3.2 oz (79 kg)   01/14/18 174 lb (78.9 kg)   01/10/18 175 lb 12.8 oz (79.7 kg)        Labs reviewed in EPIC    Reviewed and updated as needed this visit by clinical staff  Tobacco  Allergies  Meds  Problems  Med Hx  Surg Hx  Fam Hx  Soc Hx        Reviewed and updated as needed this visit by Provider  Tobacco  Allergies  Meds  Problems  Med Hx  Surg Hx  Fam Hx  Soc Hx          ROS:  Constitutional, HEENT, cardiovascular, pulmonary, GI, musculoskeletal, neuro, and psych systems are negative, except as otherwise noted.    OBJECTIVE:     /65 (BP Location: Right arm, Patient Position: Chair, Cuff Size: Adult Large)  Pulse 67  Temp 97.3  F (36.3  C) (Tympanic)  Resp 16  Ht 5' 1.25\" (1.556 m)  Wt 174 lb 3.2 oz (79 kg)  LMP 02/24/1993  " BMI 32.65 kg/m2  Body mass index is 32.65 kg/(m^2).  GENERAL: healthy, alert and no distress  HENT: R lower jaw with broken tooth, no swelling, discharge, tenderness, no tenderness on oral floor or under chin, no facial swelling  NECK: no adenopathy, no asymmetry, masses, or scars  PSYCH: mentation appears normal, affect normal/bright    ASSESSMENT/PLAN:       ICD-10-CM    1. Pain, dental K08.89 clindamycin (CLEOCIN) 300 MG capsule   2. Major depressive disorder, recurrent episode, mild (H) F33.0    3. Chronic low back pain, unspecified back pain laterality, with sciatica presence unspecified M54.5 gabapentin (NEURONTIN) 300 MG capsule    G89.29 baclofen (LIORESAL) 20 MG tablet     meloxicam (MOBIC) 15 MG tablet   4. CKD (chronic kidney disease) stage 3, GFR 30-59 ml/min N18.3    5. Gastroesophageal reflux disease, esophagitis presence not specified K21.9 omeprazole (PRILOSEC) 20 MG CR capsule   6. Muscle twitching R25.3 gabapentin (NEURONTIN) 300 MG capsule     baclofen (LIORESAL) 20 MG tablet   7. Anxiety F41.9    8. Restless legs syndrome (RLS) G25.81 rOPINIRole (REQUIP) 0.25 MG tablet   9. Mild persistent asthma without complication J45.30 fluticasone-salmeterol (ADVAIR-HFA) 45-21 MCG/ACT inhaler     albuterol (PROAIR HFA/PROVENTIL HFA/VENTOLIN HFA) 108 (90 BASE) MCG/ACT Inhaler     Patient Instructions   Treat for possible tooth infection  If worsening, this is dental or emergency room concern.  Ultimately need tooth pulled.    Perhaps start savings account or switch to better dental insurance.    Heartburn - discussed in future perhaps again try reducing omeprazole, perhaps alternating days with ranitidine    Asthma - doing well    Mood - will try switching to cymbalta which could also help pain  Discussed with kidneys, prefer to reduce or get rid of meloxicam if possible  Currently on sertraline 100.  To switch: decrease sertraline to 50 mg x 1 wk, then can stop sertraline and start cymbalta (duloxetine) 30  mg.  If not feeling well with switch, call me.  Otherwise recheck 4 wks.      [facility] Schedule  [facility] ~ [phone]  One Day Weekly- Alternating Days    [place] ~ [phone]  Every other Monday or Wednesday   & one Saturday morning a month    [place] ~ [phone]  Every Other Monday Morning    [place] ~ [phone]  Every Other Monday Afternoon    [place] ~ [phone]  Every Monday morning  Every Tuesday afternoon       Wed, Thurs, Friday morning & afternoon        Tresa Best PA-C  [facility]

## 2018-04-06 NOTE — NURSING NOTE
"Chief Complaint   Patient presents with     Dental Pain       Initial /65 (BP Location: Right arm, Patient Position: Chair, Cuff Size: Adult Large)  Pulse 67  Temp 97.3  F (36.3  C) (Tympanic)  Resp 16  Ht 5' 1.25\" (1.556 m)  Wt 174 lb 3.2 oz (79 kg)  LMP 02/24/1993  BMI 32.65 kg/m2 Estimated body mass index is 32.65 kg/(m^2) as calculated from the following:    Height as of this encounter: 5' 1.25\" (1.556 m).    Weight as of this encounter: 174 lb 3.2 oz (79 kg).      Health Maintenance that is potentially due pending provider review:  Mammogram        Is there anyone who you would like to be able to receive your results? No  If yes have patient fill out GALLO      "

## 2018-04-07 ASSESSMENT — PATIENT HEALTH QUESTIONNAIRE - PHQ9: SUM OF ALL RESPONSES TO PHQ QUESTIONS 1-9: 15

## 2018-04-07 ASSESSMENT — ANXIETY QUESTIONNAIRES: GAD7 TOTAL SCORE: 7

## 2018-04-18 ENCOUNTER — HOSPITAL ENCOUNTER (OUTPATIENT)
Dept: PHYSICAL THERAPY | Facility: CLINIC | Age: 58
Setting detail: THERAPIES SERIES
End: 2018-04-18
Attending: PODIATRIST
Payer: MEDICARE

## 2018-04-18 PROCEDURE — 97032 APPL MODALITY 1+ESTIM EA 15: CPT | Mod: GP | Performed by: PHYSICAL THERAPIST

## 2018-04-18 PROCEDURE — 40000718 ZZHC STATISTIC PT DEPARTMENT ORTHO VISIT: Performed by: PHYSICAL THERAPIST

## 2018-04-18 PROCEDURE — 97140 MANUAL THERAPY 1/> REGIONS: CPT | Mod: GP | Performed by: PHYSICAL THERAPIST

## 2018-04-23 ENCOUNTER — HOSPITAL ENCOUNTER (OUTPATIENT)
Dept: PHYSICAL THERAPY | Facility: CLINIC | Age: 58
Setting detail: THERAPIES SERIES
End: 2018-04-23
Attending: PODIATRIST
Payer: MEDICARE

## 2018-04-23 PROCEDURE — 97032 APPL MODALITY 1+ESTIM EA 15: CPT | Mod: GP | Performed by: PHYSICAL THERAPIST

## 2018-04-23 PROCEDURE — 40000718 ZZHC STATISTIC PT DEPARTMENT ORTHO VISIT: Performed by: PHYSICAL THERAPIST

## 2018-04-23 PROCEDURE — 97140 MANUAL THERAPY 1/> REGIONS: CPT | Mod: GP | Performed by: PHYSICAL THERAPIST

## 2018-04-26 ENCOUNTER — MYC MEDICAL ADVICE (OUTPATIENT)
Dept: FAMILY MEDICINE | Facility: CLINIC | Age: 58
End: 2018-04-26

## 2018-04-26 ENCOUNTER — HOSPITAL ENCOUNTER (OUTPATIENT)
Dept: PHYSICAL THERAPY | Facility: CLINIC | Age: 58
Setting detail: THERAPIES SERIES
End: 2018-04-26
Attending: PODIATRIST
Payer: MEDICARE

## 2018-04-26 PROCEDURE — 40000718 ZZHC STATISTIC PT DEPARTMENT ORTHO VISIT: Performed by: PHYSICAL THERAPIST

## 2018-04-26 PROCEDURE — 97140 MANUAL THERAPY 1/> REGIONS: CPT | Mod: GP | Performed by: PHYSICAL THERAPIST

## 2018-04-26 PROCEDURE — G8978 MOBILITY CURRENT STATUS: HCPCS | Mod: GP,CJ | Performed by: PHYSICAL THERAPIST

## 2018-04-26 PROCEDURE — 97032 APPL MODALITY 1+ESTIM EA 15: CPT | Mod: GP | Performed by: PHYSICAL THERAPIST

## 2018-04-26 PROCEDURE — G8979 MOBILITY GOAL STATUS: HCPCS | Mod: GP,CH | Performed by: PHYSICAL THERAPIST

## 2018-04-30 ENCOUNTER — HOSPITAL ENCOUNTER (OUTPATIENT)
Dept: PHYSICAL THERAPY | Facility: CLINIC | Age: 58
Setting detail: THERAPIES SERIES
End: 2018-04-30
Attending: PODIATRIST
Payer: MEDICARE

## 2018-04-30 PROCEDURE — 97110 THERAPEUTIC EXERCISES: CPT | Mod: GP | Performed by: PHYSICAL THERAPIST

## 2018-04-30 PROCEDURE — 97140 MANUAL THERAPY 1/> REGIONS: CPT | Mod: GP | Performed by: PHYSICAL THERAPIST

## 2018-04-30 PROCEDURE — 40000718 ZZHC STATISTIC PT DEPARTMENT ORTHO VISIT: Performed by: PHYSICAL THERAPIST

## 2018-04-30 NOTE — PROGRESS NOTES
Outpatient Physical Therapy Progress Note     Patient: Patricia Smith  : 1960    Beginning/End Dates of Reporting Period:  3/2/2018 to 2018    Referring Provider: Linda Cook Diagnosis: Impaired ROM and strength secondary to post op L Ankle     Client Self Report: pt states she could not do her ankle exercise, cannot tie the band to any objects.    Objective Measurements:  Objective Measure: ROM LLE  Details: 30* lacking dorsiflexion. AROM       Goals:  Goal Identifier ROM   Goal Description Pt will demonstrate atleast 0* ankle dorsiflexion AROM in order to participate with standing > 1 hour without increased ankle pain.   Target Date 18   Date Met   (lacking 30* extension this visit.)   Progress:     Goal Identifier Walking   Goal Description pt will demonstrate increased dorsiflexion strength of atleast 4-/5 in order to participate with walking >1 mile.   Target Date 18   Date Met   (can stand 45 minutes at work.)   Progress:     Goal Identifier Stairs   Goal Description pt will demonstrate increased gastroc flexibility in order to participate with descending the stairs without increased ankle pain.   Target Date 18   Date Met   (impaired flexibilty as indicated by dorisflexion)   Progress:     Goal Identifier     Goal Description     Target Date     Date Met      Progress:     Goal Identifier     Goal Description     Target Date     Date Met      Progress:     Goal Identifier     Goal Description     Target Date     Date Met      Progress:     Goal Identifier     Goal Description     Target Date     Date Met      Progress:     Goal Identifier     Goal Description     Target Date     Date Met      Progress:     Progress Toward Goals:   Progress this reporting period: pt continues to lack AROM into dorsiflexion and states that electronic stimulation is uncomfortable. Pt demonstrates some improvement with activation of dorsiflexors and improved AAROM and PROM. Pt therefore requires  increased PT to work on progression of AROM and dorsiflexion strength through functional exercise.          Plan:  Continue therapy per current plan of care.    Discharge:  No

## 2018-05-02 ENCOUNTER — HOSPITAL ENCOUNTER (OUTPATIENT)
Dept: PHYSICAL THERAPY | Facility: CLINIC | Age: 58
Setting detail: THERAPIES SERIES
End: 2018-05-02
Attending: PODIATRIST
Payer: MEDICARE

## 2018-05-02 PROCEDURE — 40000718 ZZHC STATISTIC PT DEPARTMENT ORTHO VISIT: Performed by: PHYSICAL THERAPIST

## 2018-05-02 PROCEDURE — 97110 THERAPEUTIC EXERCISES: CPT | Mod: GP | Performed by: PHYSICAL THERAPIST

## 2018-05-02 PROCEDURE — 97140 MANUAL THERAPY 1/> REGIONS: CPT | Mod: GP | Performed by: PHYSICAL THERAPIST

## 2018-05-09 ENCOUNTER — HOSPITAL ENCOUNTER (OUTPATIENT)
Dept: PHYSICAL THERAPY | Facility: CLINIC | Age: 58
Setting detail: THERAPIES SERIES
End: 2018-05-09
Attending: PODIATRIST
Payer: MEDICARE

## 2018-05-09 PROCEDURE — 40000718 ZZHC STATISTIC PT DEPARTMENT ORTHO VISIT: Performed by: PHYSICAL THERAPIST

## 2018-05-09 PROCEDURE — 97110 THERAPEUTIC EXERCISES: CPT | Mod: GP | Performed by: PHYSICAL THERAPIST

## 2018-05-09 PROCEDURE — 97140 MANUAL THERAPY 1/> REGIONS: CPT | Mod: GP | Performed by: PHYSICAL THERAPIST

## 2018-05-09 NOTE — PROGRESS NOTES
RECERTIFICATION    Patricia Smith  1960    Session Number: 13/20, 2x per week x10 weeks Medicare since start of care.    Reasons for Continuing Treatment:   To increase ROM and maintain strength for ambulation.    Frequency/Duration  2 times per week for 6 weeks for a total of 12 visits.    Recertification Period  5/9/2018 - 6/20/2018    Physician Signature:    Date:    X_______________________________________________________    Physician Name: Linda    I certify the need for these services furnished under this plan of treatment and while under my care. Physician co-signature of this document indicates review and certification of the therapy plan.  This signature may be written on paper, or electronically signed within EPIC.

## 2018-05-14 ENCOUNTER — HOSPITAL ENCOUNTER (OUTPATIENT)
Dept: PHYSICAL THERAPY | Facility: CLINIC | Age: 58
Setting detail: THERAPIES SERIES
End: 2018-05-14
Attending: PODIATRIST
Payer: MEDICARE

## 2018-05-14 PROCEDURE — 40000718 ZZHC STATISTIC PT DEPARTMENT ORTHO VISIT: Performed by: PHYSICAL THERAPIST

## 2018-05-14 PROCEDURE — 97140 MANUAL THERAPY 1/> REGIONS: CPT | Mod: GP | Performed by: PHYSICAL THERAPIST

## 2018-05-14 PROCEDURE — 97110 THERAPEUTIC EXERCISES: CPT | Mod: GP | Performed by: PHYSICAL THERAPIST

## 2018-05-21 ENCOUNTER — TRANSFERRED RECORDS (OUTPATIENT)
Dept: HEALTH INFORMATION MANAGEMENT | Facility: CLINIC | Age: 58
End: 2018-05-21

## 2018-05-31 ENCOUNTER — HOSPITAL ENCOUNTER (OUTPATIENT)
Dept: PHYSICAL THERAPY | Facility: CLINIC | Age: 58
Setting detail: THERAPIES SERIES
End: 2018-05-31
Attending: PODIATRIST
Payer: MEDICARE

## 2018-05-31 DIAGNOSIS — N18.30 CKD (CHRONIC KIDNEY DISEASE) STAGE 3, GFR 30-59 ML/MIN (H): ICD-10-CM

## 2018-05-31 DIAGNOSIS — E03.9 HYPOTHYROIDISM, UNSPECIFIED TYPE: Primary | Chronic | ICD-10-CM

## 2018-05-31 DIAGNOSIS — R79.89 ELEVATED TSH: ICD-10-CM

## 2018-05-31 LAB
CREAT UR-MCNC: 10 MG/DL
MICROALBUMIN UR-MCNC: <5 MG/L
MICROALBUMIN/CREAT UR: NORMAL MG/G CR (ref 0–25)
T4 FREE SERPL-MCNC: 0.93 NG/DL (ref 0.76–1.46)
TSH SERPL DL<=0.005 MIU/L-ACNC: 7.09 MU/L (ref 0.4–4)

## 2018-05-31 PROCEDURE — 84439 ASSAY OF FREE THYROXINE: CPT | Performed by: PHYSICIAN ASSISTANT

## 2018-05-31 PROCEDURE — 36415 COLL VENOUS BLD VENIPUNCTURE: CPT | Performed by: PHYSICIAN ASSISTANT

## 2018-05-31 PROCEDURE — 97110 THERAPEUTIC EXERCISES: CPT | Mod: GP | Performed by: PHYSICAL THERAPIST

## 2018-05-31 PROCEDURE — 40000718 ZZHC STATISTIC PT DEPARTMENT ORTHO VISIT: Performed by: PHYSICAL THERAPIST

## 2018-05-31 PROCEDURE — 82043 UR ALBUMIN QUANTITATIVE: CPT | Performed by: PHYSICIAN ASSISTANT

## 2018-05-31 PROCEDURE — 97140 MANUAL THERAPY 1/> REGIONS: CPT | Mod: GP | Performed by: PHYSICAL THERAPIST

## 2018-05-31 PROCEDURE — 84443 ASSAY THYROID STIM HORMONE: CPT | Performed by: PHYSICIAN ASSISTANT

## 2018-06-01 ENCOUNTER — MYC MEDICAL ADVICE (OUTPATIENT)
Dept: FAMILY MEDICINE | Facility: CLINIC | Age: 58
End: 2018-06-01

## 2018-06-01 RX ORDER — LEVOTHYROXINE SODIUM 25 UG/1
25 TABLET ORAL DAILY
Qty: 60 TABLET | Refills: 0 | Status: SHIPPED | OUTPATIENT
Start: 2018-06-01 | End: 2018-07-24

## 2018-06-01 NOTE — PROGRESS NOTES
Donna Gomez,    Thyroid lab looks like you need to be on medication.  I've sent a prescription.  Repeat lab in 6-8 weeks.    Urine protein test is normal.    Please contact me if you have questions.    Tresa Best PA-C

## 2018-06-04 ENCOUNTER — HOSPITAL ENCOUNTER (OUTPATIENT)
Dept: PHYSICAL THERAPY | Facility: CLINIC | Age: 58
Setting detail: THERAPIES SERIES
End: 2018-06-04
Attending: PODIATRIST
Payer: MEDICARE

## 2018-06-04 PROCEDURE — 40000718 ZZHC STATISTIC PT DEPARTMENT ORTHO VISIT: Performed by: PHYSICAL THERAPIST

## 2018-06-04 PROCEDURE — 97140 MANUAL THERAPY 1/> REGIONS: CPT | Mod: GP | Performed by: PHYSICAL THERAPIST

## 2018-06-04 PROCEDURE — 97110 THERAPEUTIC EXERCISES: CPT | Mod: GP | Performed by: PHYSICAL THERAPIST

## 2018-06-11 ENCOUNTER — OFFICE VISIT (OUTPATIENT)
Dept: URGENT CARE | Facility: URGENT CARE | Age: 58
End: 2018-06-11
Payer: MEDICARE

## 2018-06-11 VITALS
SYSTOLIC BLOOD PRESSURE: 124 MMHG | TEMPERATURE: 97.7 F | WEIGHT: 170.8 LBS | BODY MASS INDEX: 32.01 KG/M2 | DIASTOLIC BLOOD PRESSURE: 74 MMHG | RESPIRATION RATE: 16 BRPM | OXYGEN SATURATION: 100 % | HEART RATE: 79 BPM

## 2018-06-11 DIAGNOSIS — B37.2 YEAST INFECTION OF THE SKIN: Primary | ICD-10-CM

## 2018-06-11 PROCEDURE — 99213 OFFICE O/P EST LOW 20 MIN: CPT | Performed by: NURSE PRACTITIONER

## 2018-06-11 RX ORDER — NYSTATIN 100000 [USP'U]/G
POWDER TOPICAL 3 TIMES DAILY PRN
Qty: 30 G | Refills: 1 | Status: SHIPPED | OUTPATIENT
Start: 2018-06-11

## 2018-06-11 NOTE — PATIENT INSTRUCTIONS
Candida Skin Infection (Adult)  Candida is type of yeast. It grows naturally on the skin and in the mouth. If it grows out of control, it can cause an infection. Candida can cause infections in the genital area, skin folds, in the mouth, and under the breasts. Anyone can get this infection. It is more common in a person with a weak immune system, such as from diabetes, HIV, or cancer. It s also more common in someone who has been on antibiotic therapy. And it s more common people who are overweight or who have incontinence. Wearing tight-fitting clothing and taking part in activities with lots of skin-to-skin contact can also put you at risk.  Candida causes the skin to become bright red and inflamed. The border of the infected part of the skin is often raised. The infection causes pain and itching. Sometimes the skin peels and bleeds. In the mouth, candida is called thrush, and may cause white thickened areas.  A Candida rash is most often treated with an antifungal cream or ointment. The rash will clear a few days after starting the medicine. Infections that don t go away may need a prescription medicine. In rare cases, a bacterial infection can also occur.  Home care  Your healthcare provider will recommend an antifungal cream or ointment for the rash. He or she may also prescribe a medicine for the itch. Follow all instructions for using these medicines. Don t use cornstarch powder. Cornstarch can cause the Candida infection to get worse.  General care:    Keep your skin clean by washing the area twice a day.    Use the cream as directed until your rash is gone. Once the skin has healed, keep it dry to prevent another infection.     If you are overweight, talk with your healthcare provider about a plan to lose excess weight.    Avoid clothes that fit tightly.  Follow-up care  Follow up with your healthcare provider, or as advised. Your rash will clear in 7 to 14 days. Call your healthcare provider if the rash  is not gone after 14 days.  When to seek medical advice  Call your healthcare provider right away if any of these occur:    Pain or redness that gets worse or spreads    Fluid coming from the skin    Yellow crusts on the skin    Fever of 100.4 F (38 C) or higher, or as directed by your healthcare provider  Date Last Reviewed: 9/1/2016 2000-2017 [employer]. [address] [place] [address]. All rights reserved. This information is not intended as a substitute for professional medical care. Always follow your healthcare professional's instructions.

## 2018-06-11 NOTE — MR AVS SNAPSHOT
After Visit Summary   6/11/2018    Patricia Smith    MRN: [id number]           Patient Information     Date Of Birth          1960        Visit Information        Provider Department      6/11/2018 6:00 PM Marla Garner APRN [facility]        Today's Diagnoses     Yeast infection of the skin    -  1      Care Instructions      Candida Skin Infection (Adult)  Candida is type of yeast. It grows naturally on the skin and in the mouth. If it grows out of control, it can cause an infection. Candida can cause infections in the genital area, skin folds, in the mouth, and under the breasts. Anyone can get this infection. It is more common in a person with a weak immune system, such as from diabetes, HIV, or cancer. It s also more common in someone who has been on antibiotic therapy. And it s more common people who are overweight or who have incontinence. Wearing tight-fitting clothing and taking part in activities with lots of skin-to-skin contact can also put you at risk.  Candida causes the skin to become bright red and inflamed. The border of the infected part of the skin is often raised. The infection causes pain and itching. Sometimes the skin peels and bleeds. In the mouth, candida is called thrush, and may cause white thickened areas.  A Candida rash is most often treated with an antifungal cream or ointment. The rash will clear a few days after starting the medicine. Infections that don t go away may need a prescription medicine. In rare cases, a bacterial infection can also occur.  Home care  Your healthcare provider will recommend an antifungal cream or ointment for the rash. He or she may also prescribe a medicine for the itch. Follow all instructions for using these medicines. Don t use cornstarch powder. Cornstarch can cause the Candida infection to get worse.  General care:    Keep your skin clean by washing the area twice a day.    Use the cream as  directed until your rash is gone. Once the skin has healed, keep it dry to prevent another infection.     If you are overweight, talk with your healthcare provider about a plan to lose excess weight.    Avoid clothes that fit tightly.  Follow-up care  Follow up with your healthcare provider, or as advised. Your rash will clear in 7 to 14 days. Call your healthcare provider if the rash is not gone after 14 days.  When to seek medical advice  Call your healthcare provider right away if any of these occur:    Pain or redness that gets worse or spreads    Fluid coming from the skin    Yellow crusts on the skin    Fever of 100.4 F (38 C) or higher, or as directed by your healthcare provider  Date Last Reviewed: 9/1/2016 2000-2017 [employer]. [address] [place] [address]. All rights reserved. This information is not intended as a substitute for professional medical care. Always follow your healthcare professional's instructions.                Follow-ups after your visit        Your next 10 appointments already scheduled     Aug 14, 2018  9:30 AM CDT   [facility]    [address] [place] [id number] [phone]              Who to contact     If you have questions or need follow up information about today's clinic visit or your schedule please contact [facility] directly at [phone].  Normal or non-critical lab and imaging results will be communicated to you by MyChart, letter or phone within 4 business days after the clinic has received the results. If you do not hear from us within 7 days, please contact the clinic through MyChart or phone. If you have a critical or abnormal lab result, we will notify you by phone as soon as possible.  Submit refill requests through [employer] or call your pharmacy and they will forward the refill request to us. Please allow 3  business days for your refill to be completed.          Additional Information About Your Visit        MyChart Information     [email or web address] gives you secure access to your electronic health record. If you see a primary care provider, you can also send messages to your care team and make appointments. If you have questions, please call your primary care clinic.  If you do not have a primary care provider, please call [phone] and they will assist you.        Care EveryWhere ID     This is your Care EveryWhere ID. This could be used by other organizations to access your [place] medical records  [id number]        Your Vitals Were     Pulse Temperature Respirations Last Period Pulse Oximetry BMI (Body Mass Index)    79 97.7  F (36.5  C) (Tympanic) 16 02/24/1993 100% 32.01 kg/m2       Blood Pressure from Last 3 Encounters:   06/11/18 124/74   04/06/18 113/65   01/14/18 149/82    Weight from Last 3 Encounters:   06/11/18 170 lb 12.8 oz (77.5 kg)   04/06/18 174 lb 3.2 oz (79 kg)   01/14/18 174 lb (78.9 kg)              Today, you had the following     No orders found for display         Today's Medication Changes          These changes are accurate as of 6/11/18  6:44 PM.  If you have any questions, ask your nurse or doctor.               Start taking these medicines.        Dose/Directions    nystatin 238006 UNIT/GM Powd   Commonly known as:  MYCOSTATIN   Used for:  Yeast infection of the skin   Started by:  Marla Garner APRN CNP        Apply topically 3 times daily as needed   Quantity:  30 g   Refills:  1            Where to get your medicines      These medications were sent to [facility] [address] [address]     Phone:  [phone]     nystatin [id number] UNIT/GM Powd                Primary Care Provider Office Phone # Fax #    Tresa Best PA-C [phone]       [address] [place] [id number]        Equal  Access to Services     [facility]: Hadii aad ku hadrodrigo Davis, waelinda luqadaha, qaybta kacjgay faulnker. So [facility] [phone].    ATENCIÓN: Si alexys heredia, tiene a dillon disposición servicios gratuitos de asistencia lingüística. Valentinoame al [phone].    We comply with applicable federal civil rights laws and [place] laws. We do not discriminate on the basis of race, color, national origin, age, disability, sex, sexual orientation, or gender identity.            Thank you!     Thank you for choosing [facility]  for your care. Our goal is always to provide you with excellent care. Hearing back from our patients is one way we can continue to improve our services. Please take a few minutes to complete the written survey that you may receive in the mail after your visit with us. Thank you!             Your Updated Medication List - Protect others around you: Learn how to safely use, store and throw away your medicines at www.disposemymeds.org.          This list is accurate as of 6/11/18  6:44 PM.  Always use your most recent med list.                   Brand Name Dispense Instructions for use Diagnosis    albuterol 108 (90 Base) MCG/ACT Inhaler    PROAIR HFA/PROVENTIL HFA/VENTOLIN HFA    1 Inhaler    Inhale 2 puffs into the lungs every 6 hours    Mild persistent asthma without complication       baclofen 20 MG tablet    LIORESAL    60 tablet    Take 1 tablet (20 mg) by mouth 2 times daily    Chronic low back pain, unspecified back pain laterality, with sciatica presence unspecified, Muscle twitching       cyanocobalamin 1000 MCG tablet    vitamin  B-12     Take 1,000 mcg by mouth daily        fexofenadine-pseudoePHEDrine  MG per 12 hr tablet    ALLEGRA-D    60 tablet    Take 1 tablet by mouth 2 times daily    Allergic rhinitis due to pollen, unspecified chronicity, unspecified seasonality       fluticasone-salmeterol 45-21  MCG/ACT inhaler    ADVAIR-HFA    1 Inhaler    Inhale 2 puffs into the lungs 2 times daily as needed    Mild persistent asthma without complication       furosemide 40 MG tablet    LASIX    90 tablet    TAKE ONE TABLET BY MOUTH EVERY DAY    Peripheral edema       gabapentin 300 MG capsule    NEURONTIN    270 capsule    3 caps morning and 4 caps evening.    Chronic low back pain, unspecified back pain laterality, with sciatica presence unspecified, Muscle twitching       levothyroxine 25 MCG tablet    SYNTHROID/LEVOTHROID    60 tablet    Take 1 tablet (25 mcg) by mouth daily Lab in 6-8 weeks.    Hypothyroidism, unspecified type       meloxicam 15 MG tablet    MOBIC    90 tablet    Take 1 tablet (15 mg) by mouth daily    Chronic low back pain, unspecified back pain laterality, with sciatica presence unspecified       MULTI FOR HER 50+ Caps           nystatin [id number] UNIT/GM Powd    MYCOSTATIN    30 g    Apply topically 3 times daily as needed    Yeast infection of the skin       omeprazole 20 MG CR capsule    priLOSEC    90 capsule    Take 1 capsule (20 mg) by mouth daily    Gastroesophageal reflux disease, esophagitis presence not specified       rOPINIRole 0.25 MG tablet    REQUIP    60 tablet    Take 1-2 tablets (0.25-0.5 mg) by mouth At Bedtime    Restless legs syndrome (RLS)       sertraline 100 MG tablet    ZOLOFT    90 tablet    Take 1 tablet (100 mg) by mouth daily    Anxiety, Major depressive disorder, recurrent episode, mild (H)       Vitamin D-3 1000 units Caps      Take 5,000 Units by mouth daily

## 2018-06-11 NOTE — PROGRESS NOTES
SUBJECTIVE:  Patricia Smith is a 58 year old female who presents to the clinic today for a rash.  Onset of rash was 2 day(s) ago.   Rash is gradual onset.  Location of the rash: breast.  Quality/symptoms of rash: burning   Symptoms are moderate and rash seems to be worsening.  Previous history of a similar rash? No  Recent exposure history: none known    Associated symptoms include: nothing.    Past Medical History:   Diagnosis Date     Allergic rhinitis      Anxiety      Asthma      Constipation      Degeneration of cervical intervertebral disc      Degeneration of lumbosacral intervertebral disc      Degenerative disc disease      Depression      Depressive disorder      DJD (degenerative joint disease)      GERD (gastroesophageal reflux disease)      Hemorrhoids      History of blood transfusion     multiple for menorrhagia, last transfusion 1977.  pt reports has had HIV and hep B & C testing     Hypothyroidism      Hypoxia 10/2017    postop hypoxia, ? bronchospasm v CHS v aspiration     Leukocytosis 9/29/2017     Leukocytosis      Leukocytosis      Old MI (myocardial infarction) 10/9/2017     Pelvic fracture (H)      Tinea corporis      Current Outpatient Prescriptions   Medication Sig Dispense Refill     albuterol (PROAIR HFA/PROVENTIL HFA/VENTOLIN HFA) 108 (90 BASE) MCG/ACT Inhaler Inhale 2 puffs into the lungs every 6 hours 1 Inhaler 3     baclofen (LIORESAL) 20 MG tablet Take 1 tablet (20 mg) by mouth 2 times daily 60 tablet 11     cyanocobalamin (VITAMIN  B-12) 1000 MCG tablet Take 1,000 mcg by mouth daily       fexofenadine-pseudoePHEDrine (ALLEGRA-D)  MG per 12 hr tablet Take 1 tablet by mouth 2 times daily 60 tablet 11     fluticasone-salmeterol (ADVAIR-HFA) 45-21 MCG/ACT inhaler Inhale 2 puffs into the lungs 2 times daily as needed 1 Inhaler 11     furosemide (LASIX) 40 MG tablet TAKE ONE TABLET BY MOUTH EVERY DAY 90 tablet 3     gabapentin (NEURONTIN) 300 MG capsule 3 caps morning and 4 caps  evening. 270 capsule 5     levothyroxine (SYNTHROID/LEVOTHROID) 25 MCG tablet Take 1 tablet (25 mcg) by mouth daily Lab in 6-8 weeks. 60 tablet 0     meloxicam (MOBIC) 15 MG tablet Take 1 tablet (15 mg) by mouth daily 90 tablet 1     omeprazole (PRILOSEC) 20 MG CR capsule Take 1 capsule (20 mg) by mouth daily 90 capsule 3     rOPINIRole (REQUIP) 0.25 MG tablet Take 1-2 tablets (0.25-0.5 mg) by mouth At Bedtime 60 tablet 11     sertraline (ZOLOFT) 100 MG tablet Take 1 tablet (100 mg) by mouth daily 90 tablet 1     Cholecalciferol (VITAMIN D-3) 1000 UNITS CAPS Take 5,000 Units by mouth daily        Multiple Vitamins-Minerals (MULTI FOR HER 50+) CAPS        Social History   Substance Use Topics     Smoking status: Former Smoker     Packs/day: 1.00     Years: 10.00     Types: Cigarettes     Smokeless tobacco: Never Used      Comment: started at age 19.  Smoked about 15 years     Alcohol use No       ROS:  CONSTITUTIONAL:NEGATIVE for fever, chills, change in weight  INTEGUMENTARY/SKIN: NEGATIVE for worrisome rashes, moles or lesions  EYES: NEGATIVE for vision changes or irritation  ENT/MOUTH: NEGATIVE for ear, mouth and throat problems  RESP:NEGATIVE for significant cough or SOB  CV: See above   MUSCULOSKELETAL: NEGATIVE for significant arthralgias or myalgia  NEURO: NEGATIVE for weakness, dizziness or paresthesias    EXAM:   /74  Pulse 79  Temp 97.7  F (36.5  C) (Tympanic)  Resp 16  Wt 170 lb 12.8 oz (77.5 kg)  LMP 02/24/1993  SpO2 100%  BMI 32.01 kg/m2  GENERAL: alert, no acute distress.  SKIN: Rash description:    Distribution: localized  Location: under breast  Examination of the rash reveals: Candida: flat, intensely inflamed, well-defined, clustered bright red macules    GENERAL APPEARANCE: healthy, alert and no distress  EYES: EOMI,  PERRL, conjunctiva clear  NECK: supple, non-tender to palpation, no adenopathy noted  RESP: lungs clear to auscultation - no rales, rhonchi or wheezes  CV: regular rates  and rhythm, normal S1 S2, no murmur noted    ASSESSMENT:    ICD-10-CM    1. Yeast infection of the skin B37.2 nystatin (MYCOSTATIN) [id number] UNIT/GM POWD         PLAN:  Patient Instructions     Candida Skin Infection (Adult)  Candida is type of yeast. It grows naturally on the skin and in the mouth. If it grows out of control, it can cause an infection. Candida can cause infections in the genital area, skin folds, in the mouth, and under the breasts. Anyone can get this infection. It is more common in a person with a weak immune system, such as from diabetes, HIV, or cancer. It s also more common in someone who has been on antibiotic therapy. And it s more common people who are overweight or who have incontinence. Wearing tight-fitting clothing and taking part in activities with lots of skin-to-skin contact can also put you at risk.  Candida causes the skin to become bright red and inflamed. The border of the infected part of the skin is often raised. The infection causes pain and itching. Sometimes the skin peels and bleeds. In the mouth, candida is called thrush, and may cause white thickened areas.  A Candida rash is most often treated with an antifungal cream or ointment. The rash will clear a few days after starting the medicine. Infections that don t go away may need a prescription medicine. In rare cases, a bacterial infection can also occur.  Home care  Your healthcare provider will recommend an antifungal cream or ointment for the rash. He or she may also prescribe a medicine for the itch. Follow all instructions for using these medicines. Don t use cornstarch powder. Cornstarch can cause the Candida infection to get worse.  General care:    Keep your skin clean by washing the area twice a day.    Use the cream as directed until your rash is gone. Once the skin has healed, keep it dry to prevent another infection.     If you are overweight, talk with your healthcare provider about a plan to lose excess  weight.    Avoid clothes that fit tightly.  Follow-up care  Follow up with your healthcare provider, or as advised. Your rash will clear in 7 to 14 days. Call your healthcare provider if the rash is not gone after 14 days.  When to seek medical advice  Call your healthcare provider right away if any of these occur:    Pain or redness that gets worse or spreads    Fluid coming from the skin    Yellow crusts on the skin    Fever of 100.4 F (38 C) or higher, or as directed by your healthcare provider  Date Last Reviewed: 9/1/2016 2000-2017 [employer]. [address] [place] [address]. All rights reserved. This information is not intended as a substitute for professional medical care. Always follow your healthcare professional's instructions.            CANDICE Lipscomb CNP

## 2018-06-20 ENCOUNTER — OFFICE VISIT (OUTPATIENT)
Dept: FAMILY MEDICINE | Facility: CLINIC | Age: 58
End: 2018-06-20
Payer: MEDICARE

## 2018-06-20 VITALS
HEART RATE: 88 BPM | BODY MASS INDEX: 31.86 KG/M2 | RESPIRATION RATE: 16 BRPM | OXYGEN SATURATION: 97 % | TEMPERATURE: 97.3 F | WEIGHT: 170 LBS | DIASTOLIC BLOOD PRESSURE: 80 MMHG | SYSTOLIC BLOOD PRESSURE: 132 MMHG

## 2018-06-20 DIAGNOSIS — N18.30 CKD (CHRONIC KIDNEY DISEASE) STAGE 3, GFR 30-59 ML/MIN (H): Chronic | ICD-10-CM

## 2018-06-20 DIAGNOSIS — R60.0 PERIPHERAL EDEMA: ICD-10-CM

## 2018-06-20 DIAGNOSIS — Z12.31 ENCOUNTER FOR SCREENING MAMMOGRAM FOR BREAST CANCER: ICD-10-CM

## 2018-06-20 DIAGNOSIS — F33.0 MAJOR DEPRESSIVE DISORDER, RECURRENT EPISODE, MILD (H): Primary | ICD-10-CM

## 2018-06-20 DIAGNOSIS — M54.5 CHRONIC LOW BACK PAIN, UNSPECIFIED BACK PAIN LATERALITY, WITH SCIATICA PRESENCE UNSPECIFIED: ICD-10-CM

## 2018-06-20 DIAGNOSIS — R25.3 MUSCLE TWITCHING: ICD-10-CM

## 2018-06-20 DIAGNOSIS — G89.29 CHRONIC LOW BACK PAIN, UNSPECIFIED BACK PAIN LATERALITY, WITH SCIATICA PRESENCE UNSPECIFIED: ICD-10-CM

## 2018-06-20 DIAGNOSIS — F41.9 ANXIETY: ICD-10-CM

## 2018-06-20 PROCEDURE — 99214 OFFICE O/P EST MOD 30 MIN: CPT | Performed by: PHYSICIAN ASSISTANT

## 2018-06-20 RX ORDER — SERTRALINE HYDROCHLORIDE 100 MG/1
TABLET, FILM COATED ORAL
Qty: 7 TABLET | Refills: 0 | Status: SHIPPED | OUTPATIENT
Start: 2018-06-20 | End: 2018-07-20

## 2018-06-20 RX ORDER — DULOXETIN HYDROCHLORIDE 30 MG/1
CAPSULE, DELAYED RELEASE ORAL
Qty: 30 CAPSULE | Refills: 0 | Status: SHIPPED | OUTPATIENT
Start: 2018-06-20 | End: 2018-07-16

## 2018-06-20 RX ORDER — FUROSEMIDE 40 MG
TABLET ORAL
Qty: 90 TABLET | Refills: 1 | Status: SHIPPED | OUTPATIENT
Start: 2018-06-20 | End: 2018-10-05

## 2018-06-20 RX ORDER — MELOXICAM 15 MG/1
15 TABLET ORAL DAILY
Qty: 30 TABLET | Refills: 1 | Status: SHIPPED | OUTPATIENT
Start: 2018-06-20 | End: 2018-10-05

## 2018-06-20 NOTE — PROGRESS NOTES
SUBJECTIVE:   Patricia Smith is a 58 year old female who presents to clinic today for the following health issues:    Depression and Anxiety Follow-Up    Status since last visit: starting to get crabby    Other associated symptoms:None    Complicating factors:     Significant life event: No     Current substance abuse: None    PHQ-9 9/14/2017 1/10/2018 4/6/2018   Total Score 9 10 15   Q9: Suicide Ideation Not at all Not at all Not at all     LOIDA-7 SCORE 9/14/2017 1/10/2018 4/6/2018   Total Score - - 7 (mild anxiety)   Total Score 5 6 7     PHQ-9  English  PHQ-9   Any Language  LOIDA-7  Suicide Assessment Five-step Evaluation and Treatment (SAFE-T)    Amount of exercise or physical activity: None    Problems taking medications regularly: No    Medication side effects: none    Diet: regular (no restrictions)    Some worsening.  Didn't end up trying cymbalta due to cost concerns - no drug coverage this yr.  Still figuring out if/where she will move.  Most likely [place] but expensive.    Medication Followup of back pain    Taking Medication as prescribed: yes    Side Effects:  None    Medication Helping Symptoms:  yes   Gabapentin, baclofen, mobic.      CKD3.    Edema - stable.    Feeling hot lately, sweaty at times.  Has been in menopause for years without symptoms.  Has hot uniform at work.    Problem list and histories reviewed & adjusted, as indicated.  Additional history: as documented    BP Readings from Last 3 Encounters:   06/20/18 132/80   06/11/18 124/74   04/06/18 113/65    Wt Readings from Last 3 Encounters:   06/20/18 170 lb (77.1 kg)   06/11/18 170 lb 12.8 oz (77.5 kg)   04/06/18 174 lb 3.2 oz (79 kg)         Labs reviewed in EPIC    Reviewed and updated as needed this visit by clinical staff  Tobacco  Allergies  Meds  Problems  Med Hx  Surg Hx  Fam Hx  Soc Hx        Reviewed and updated as needed this visit by Provider  Tobacco  Allergies  Meds  Problems  Med Hx  Surg Hx  Fam Hx  Soc Hx           ROS:  Constitutional, musculoskeletal, neuro, and psych systems are negative, except as otherwise noted.    OBJECTIVE:     /80  Pulse 88  Temp 97.3  F (36.3  C) (Tympanic)  Resp 16  Wt 170 lb (77.1 kg)  LMP 02/24/1993  SpO2 97%  BMI 31.86 kg/m2  Body mass index is 31.86 kg/(m^2).  GENERAL: healthy, alert and no distress  PSYCH: mentation appears normal, affect normal/bright    ASSESSMENT/PLAN:       ICD-10-CM    1. Major depressive disorder, recurrent episode, mild (H) F33.0 sertraline (ZOLOFT) 100 MG tablet     DULoxetine (CYMBALTA) 30 MG EC capsule   2. Anxiety F41.9 sertraline (ZOLOFT) 100 MG tablet     DULoxetine (CYMBALTA) 30 MG EC capsule   3. Chronic low back pain, unspecified back pain laterality, with sciatica presence unspecified M54.5 meloxicam (MOBIC) 15 MG tablet    G89.29 DULoxetine (CYMBALTA) 30 MG EC capsule   4. CKD (chronic kidney disease) stage 3, GFR 30-59 ml/min N18.3    5. Peripheral edema R60.9 furosemide (LASIX) 40 MG tablet   6. Muscle twitching R25.3    7. Encounter for screening mammogram for breast cancer Z12.31 *MA Screening Digital Bilateral   will see if diaphoresis continues or changes with change from SSRI to SNRI    Patient Instructions   [employer] for some price comparisons, sometimes need coupon    Will retry sending duloxetine (cymbalta) to see if helps mood more, but also pain - discussed trying to get off meloxicam (mobic) since is a bit tougher on tired kidneys.  Price of meloxicam could go towards duloxetine if duloxetine works...    See directions on prescriptions for how to switch.  If not feeling well with switch, call me.  Otherwise recheck 4 wks.      Tresa Best PA-C  [facility]

## 2018-06-20 NOTE — NURSING NOTE
"Chief Complaint   Patient presents with     Depression     refill meds     Back Pain     refill meds       Initial /80  Pulse 88  Temp 97.3  F (36.3  C) (Tympanic)  Resp 16  Wt 170 lb (77.1 kg)  LMP 02/24/1993  SpO2 97%  BMI 31.86 kg/m2 Estimated body mass index is 31.86 kg/(m^2) as calculated from the following:    Height as of 4/6/18: 5' 1.25\" (1.556 m).    Weight as of this encounter: 170 lb (77.1 kg).      Health Maintenance that is potentially due pending provider review:  Mammogram    Gave pt phone number/pended order to schedule mammo and/or colonoscopy(or FIT)    Is there anyone who you would like to be able to receive your results? No  If yes have patient fill out GALLO    "

## 2018-06-20 NOTE — MR AVS SNAPSHOT
After Visit Summary   6/20/2018    Patricia Smith    MRN: [id number]           Patient Information     Date Of Birth          1960        Visit Information        Provider Department      6/20/2018 10:00 AM Tresa Best PA-C [facility]        Today's Diagnoses     Anxiety        Major depressive disorder, recurrent episode, mild (H)        Chronic low back pain, unspecified back pain laterality, with sciatica presence unspecified        Peripheral edema        Muscle twitching          Care Instructions    [employer] for some price comparisons, sometimes need coupon    Will retry sending duloxetine (cymbalta) to see if helps mood more, but also pain - discussed trying to get off meloxicam (mobic) since is a bit tougher on tired kidneys.  Price of meloxicam could go towards duloxetine if duloxetine works...    See directions on prescriptions for how to switch.  If not feeling well with switch, call me.  Otherwise recheck 4 wks.          Follow-ups after your visit        Your next 10 appointments already scheduled     Jun 27, 2018  1:15 PM CDT   MA SCREENING DIGITAL BILATERAL with NBMA1   [facility] ([facility])    [address] [place] [address] [phone]           Do not use any powder, lotion or deodorant under your arms or on your breast. If you do, we will ask you to remove it before your exam.  Wear comfortable, two-piece clothing.  If you have any allergies, tell your care team.  Bring any previous mammograms from other facilities or have them mailed to the breast center.            Aug 14, 2018  9:30 AM CDT   Return Visit with Leonor Alaniz MD   [facility]    [address] [facility] [address] [phone]              Who to contact     If you have questions or need follow up information about today's clinic visit or your schedule please contact  [facility] directly at [phone].  Normal or non-critical lab and imaging results will be communicated to you by MyChart, letter or phone within 4 business days after the clinic has received the results. If you do not hear from us within 7 days, please contact the clinic through [employer] or phone. If you have a critical or abnormal lab result, we will notify you by phone as soon as possible.  Submit refill requests through [employer] or call your pharmacy and they will forward the refill request to us. Please allow 3 business days for your refill to be completed.          Additional Information About Your Visit        [employer] Information     [employer] gives you secure access to your electronic health record. If you see a primary care provider, you can also send messages to your care team and make appointments. If you have questions, please call your primary care clinic.  If you do not have a primary care provider, please call [phone] and they will assist you.        Care EveryWhere ID     This is your Care EveryWhere ID. This could be used by other organizations to access your [place] medical records  [id number]        Your Vitals Were     Pulse Temperature Respirations Last Period Pulse Oximetry BMI (Body Mass Index)    88 97.3  F (36.3  C) (Tympanic) 16 02/24/1993 97% 31.86 kg/m2       Blood Pressure from Last 3 Encounters:   06/20/18 132/80   06/11/18 124/74   04/06/18 113/65    Weight from Last 3 Encounters:   06/20/18 170 lb (77.1 kg)   06/11/18 170 lb 12.8 oz (77.5 kg)   04/06/18 174 lb 3.2 oz (79 kg)              Today, you had the following     No orders found for display         Today's Medication Changes          These changes are accurate as of 6/20/18 11:00 AM.  If you have any questions, ask your nurse or doctor.               Start taking these medicines.        Dose/Directions    DULoxetine 30 MG EC capsule   Commonly known as:  CYMBALTA   Used for:  Major depressive disorder,  recurrent episode, mild (H), Anxiety, Chronic low back pain, unspecified back pain laterality, with sciatica presence unspecified   Started by:  Tresa Best PA-C        1 tab daily - plus 0.5 tab sertraline daily x 1 week while starting duloxetine, then stop sertraline and continue duloxetine.   Quantity:  30 capsule   Refills:  0         These medicines have changed or have updated prescriptions.        Dose/Directions    sertraline 100 MG tablet   Commonly known as:  ZOLOFT   This may have changed:    - how much to take  - how to take this  - when to take this  - additional instructions   Used for:  Anxiety, Major depressive disorder, recurrent episode, mild (H)   Changed by:  Tresa Best PA-C        0.5 tab daily x 1 week while starting duloxetine, then stop sertraline and continue duloxetine.   Quantity:  7 tablet   Refills:  0         Stop taking these medicines if you haven't already. Please contact your care team if you have questions.     Vitamin D-3 1000 units Caps   Stopped by:  Tresa Best PA-C                Where to get your medicines      These medications were sent to [facility] [address] [place], [place] [address] [facility]  [address] [facility] [address]     Phone:  [phone]     DULoxetine 30 MG EC capsule    furosemide 40 MG tablet    meloxicam 15 MG tablet    sertraline 100 MG tablet                Primary Care Provider Office Phone # Fax #    Tresa Best PA-C [phone] [address] [place] [address]        Equal Access to Services     REGULO HINTON AH: Hadii aad ku hadasho Soomaali, waaxda luqadaha, qaybta kaalmada adeegyada, waxay idiin hayaan tony bernard . So [facility] [phone].    ATENCIÓN: Si habla español, tiene a dillon disposición servicios gratuitos de asistencia lingüística. Lljuaquin al [phone].    We comply with applicable federal civil rights laws and [place] laws. We do not discriminate on  the basis of race, color, national origin, age, disability, sex, sexual orientation, or gender identity.            Thank you!     Thank you for choosing [facility]  for your care. Our goal is always to provide you with excellent care. Hearing back from our patients is one way we can continue to improve our services. Please take a few minutes to complete the written survey that you may receive in the mail after your visit with us. Thank you!             Your Updated Medication List - Protect others around you: Learn how to safely use, store and throw away your medicines at www.disposemymeds.org.          This list is accurate as of 6/20/18 11:00 AM.  Always use your most recent med list.                   Brand Name Dispense Instructions for use Diagnosis    albuterol 108 (90 Base) MCG/ACT Inhaler    PROAIR HFA/PROVENTIL HFA/VENTOLIN HFA    1 Inhaler    Inhale 2 puffs into the lungs every 6 hours    Mild persistent asthma without complication       baclofen 20 MG tablet    LIORESAL    60 tablet    Take 1 tablet (20 mg) by mouth 2 times daily    Chronic low back pain, unspecified back pain laterality, with sciatica presence unspecified, Muscle twitching       cyanocobalamin 1000 MCG tablet    vitamin  B-12     Take 1,000 mcg by mouth daily        DULoxetine 30 MG EC capsule    CYMBALTA    30 capsule    1 tab daily - plus 0.5 tab sertraline daily x 1 week while starting duloxetine, then stop sertraline and continue duloxetine.    Major depressive disorder, recurrent episode, mild (H), Anxiety, Chronic low back pain, unspecified back pain laterality, with sciatica presence unspecified       fexofenadine-pseudoePHEDrine  MG per 12 hr tablet    ALLEGRA-D    60 tablet    Take 1 tablet by mouth 2 times daily    Allergic rhinitis due to pollen, unspecified chronicity, unspecified seasonality       fluticasone-salmeterol 45-21 MCG/ACT inhaler    ADVAIR-HFA    1 Inhaler    Inhale 2 puffs into the  lungs 2 times daily as needed    Mild persistent asthma without complication       furosemide 40 MG tablet    LASIX    90 tablet    TAKE ONE TABLET BY MOUTH EVERY DAY    Peripheral edema       gabapentin 300 MG capsule    NEURONTIN    270 capsule    3 caps morning and 4 caps evening.    Chronic low back pain, unspecified back pain laterality, with sciatica presence unspecified, Muscle twitching       levothyroxine 25 MCG tablet    SYNTHROID/LEVOTHROID    60 tablet    Take 1 tablet (25 mcg) by mouth daily Lab in 6-8 weeks.    Hypothyroidism, unspecified type       meloxicam 15 MG tablet    MOBIC    30 tablet    Take 1 tablet (15 mg) by mouth daily    Chronic low back pain, unspecified back pain laterality, with sciatica presence unspecified       MULTI FOR HER 50+ Caps           nystatin 163706 UNIT/GM Powd    MYCOSTATIN    30 g    Apply topically 3 times daily as needed    Yeast infection of the skin       omeprazole 20 MG CR capsule    priLOSEC    90 capsule    Take 1 capsule (20 mg) by mouth daily    Gastroesophageal reflux disease, esophagitis presence not specified       rOPINIRole 0.25 MG tablet    REQUIP    60 tablet    Take 1-2 tablets (0.25-0.5 mg) by mouth At Bedtime    Restless legs syndrome (RLS)       sertraline 100 MG tablet    ZOLOFT    7 tablet    0.5 tab daily x 1 week while starting duloxetine, then stop sertraline and continue duloxetine.    Anxiety, Major depressive disorder, recurrent episode, mild (H)

## 2018-06-20 NOTE — PATIENT INSTRUCTIONS
[employer] for some price comparisons, sometimes need coupon    Will retry sending duloxetine (cymbalta) to see if helps mood more, but also pain - discussed trying to get off meloxicam (mobic) since is a bit tougher on tired kidneys.  Price of meloxicam could go towards duloxetine if duloxetine works...    See directions on prescriptions for how to switch.  If not feeling well with switch, call me.  Otherwise recheck 4 wks.

## 2018-06-21 ASSESSMENT — ASTHMA QUESTIONNAIRES: ACT_TOTALSCORE: 22

## 2018-06-22 ENCOUNTER — MYC REFILL (OUTPATIENT)
Dept: FAMILY MEDICINE | Facility: CLINIC | Age: 58
End: 2018-06-22

## 2018-06-22 DIAGNOSIS — R25.3 MUSCLE TWITCHING: ICD-10-CM

## 2018-06-22 DIAGNOSIS — G89.29 CHRONIC LOW BACK PAIN, UNSPECIFIED BACK PAIN LATERALITY, WITH SCIATICA PRESENCE UNSPECIFIED: ICD-10-CM

## 2018-06-22 DIAGNOSIS — M54.5 CHRONIC LOW BACK PAIN, UNSPECIFIED BACK PAIN LATERALITY, WITH SCIATICA PRESENCE UNSPECIFIED: ICD-10-CM

## 2018-06-22 RX ORDER — GABAPENTIN 300 MG/1
CAPSULE ORAL
Qty: 270 CAPSULE | Refills: 5 | Status: SHIPPED | OUTPATIENT
Start: 2018-06-22 | End: 2018-10-05

## 2018-06-22 NOTE — TELEPHONE ENCOUNTER
Message from Mitesh:  Leonora Monteiro RN Fri Jun 22, 2018 10:27 AM        ----- Message -----   From: Patricia Smith   Sent: 6/22/2018 10:14 AM   To: Ben Best Care Team [place]  Subject: Medication Renewal Request     Original authorizing provider: RANDELL Corona would like a refill of the following medications:  gabapentin (NEURONTIN) 300 MG capsule [Tresa Best PA-C]    Preferred pharmacy: [facility] [address] [place] [address]    Comment:  Please send it to [facility] in [place]. I think you forgot this one. Thanks very much!!! (I'll need this one today) :)

## 2018-07-16 DIAGNOSIS — F41.9 ANXIETY: ICD-10-CM

## 2018-07-16 DIAGNOSIS — G89.29 CHRONIC LOW BACK PAIN, UNSPECIFIED BACK PAIN LATERALITY, WITH SCIATICA PRESENCE UNSPECIFIED: ICD-10-CM

## 2018-07-16 DIAGNOSIS — M54.5 CHRONIC LOW BACK PAIN, UNSPECIFIED BACK PAIN LATERALITY, WITH SCIATICA PRESENCE UNSPECIFIED: ICD-10-CM

## 2018-07-16 DIAGNOSIS — F33.0 MAJOR DEPRESSIVE DISORDER, RECURRENT EPISODE, MILD (H): ICD-10-CM

## 2018-07-16 RX ORDER — DULOXETIN HYDROCHLORIDE 30 MG/1
CAPSULE, DELAYED RELEASE ORAL
Qty: 30 CAPSULE | Refills: 0 | Status: SHIPPED | OUTPATIENT
Start: 2018-07-16 | End: 2018-07-20

## 2018-07-16 NOTE — TELEPHONE ENCOUNTER
"Requested Prescriptions   Pending Prescriptions Disp Refills     DULoxetine (CYMBALTA) 30 MG EC capsule 30 capsule 0     Si tab daily - plus 0.5 tab sertraline daily x 1 week while starting duloxetine, then stop sertraline and continue duloxetine.    Serotonin-Norepinephrine Reuptake Inhibitors  Failed    2018 10:50 AM       Failed - PHQ-9 score of less than 5 in past 6 months    Please review last PHQ-9 score.          Passed - Blood pressure under 140/90 in past 12 months    BP Readings from Last 3 Encounters:   18 132/80   18 124/74   18 113/65                Passed - Patient is age 18 or older       Passed - No active pregnancy on record       Passed - No positive pregnancy test in past 12 months       Passed - Recent (6 mo) or future (30 days) visit within the authorizing provider's specialty    Patient had office visit in the last 6 months or has a visit in the next 30 days with authorizing provider or within the authorizing provider's specialty.  See \"Patient Info\" tab in inbasket, or \"Choose Columns\" in Meds & Orders section of the refill encounter.            Last Written Prescription Date:  18  Last Fill Quantity: 30,  # refills: 0   Last office visit: 2018 with prescribing provider:  18   Future Office Visit:   Next 5 appointments (look out 90 days)     2018 10:00 AM CDT   MyChart Long with Tresa Best PA-C   [facility]    [address] [place] [id number] [phone]            Aug 14, 2018  9:30 AM CDT   Return Visit with Leonor Alaniz MD   [facility]    [address] [facility] [address] [phone]                   "

## 2018-07-18 NOTE — PROGRESS NOTES
Patient did not return for follow up treatments, however pt was making progress towards goals.  Goal status and current objective information is therefore unknown.  Discharge from PT services at this time for this episode of treatment. Please see attached documentation under this episode of care for further information including dates of service, start of care date, referring physician, Dx, treatment plan, treatments, etc.    Please contact me with any questions or concerns.    Thank you for your referral.    Shantell Oh, PT, DPT

## 2018-07-20 ENCOUNTER — OFFICE VISIT (OUTPATIENT)
Dept: FAMILY MEDICINE | Facility: CLINIC | Age: 58
End: 2018-07-20
Payer: MEDICARE

## 2018-07-20 VITALS
HEART RATE: 84 BPM | DIASTOLIC BLOOD PRESSURE: 64 MMHG | SYSTOLIC BLOOD PRESSURE: 122 MMHG | OXYGEN SATURATION: 97 % | BODY MASS INDEX: 32.35 KG/M2 | TEMPERATURE: 97.8 F | RESPIRATION RATE: 18 BRPM | WEIGHT: 172.6 LBS

## 2018-07-20 DIAGNOSIS — E03.9 HYPOTHYROIDISM, UNSPECIFIED TYPE: Chronic | ICD-10-CM

## 2018-07-20 DIAGNOSIS — M54.5 CHRONIC LOW BACK PAIN, UNSPECIFIED BACK PAIN LATERALITY, WITH SCIATICA PRESENCE UNSPECIFIED: ICD-10-CM

## 2018-07-20 DIAGNOSIS — Z11.4 ENCOUNTER FOR SCREENING FOR HIV: ICD-10-CM

## 2018-07-20 DIAGNOSIS — R22.41 LOCALIZED SWELLING, MASS, OR LUMP OF RIGHT LOWER EXTREMITY: ICD-10-CM

## 2018-07-20 DIAGNOSIS — R61 DIAPHORESIS: ICD-10-CM

## 2018-07-20 DIAGNOSIS — F33.0 MAJOR DEPRESSIVE DISORDER, RECURRENT EPISODE, MILD (H): Primary | ICD-10-CM

## 2018-07-20 DIAGNOSIS — G89.29 CHRONIC LOW BACK PAIN, UNSPECIFIED BACK PAIN LATERALITY, WITH SCIATICA PRESENCE UNSPECIFIED: ICD-10-CM

## 2018-07-20 DIAGNOSIS — F41.9 ANXIETY: ICD-10-CM

## 2018-07-20 LAB
ALBUMIN SERPL-MCNC: 3.7 G/DL (ref 3.4–5)
ALP SERPL-CCNC: 77 U/L (ref 40–150)
ALT SERPL W P-5'-P-CCNC: 32 U/L (ref 0–50)
ANION GAP SERPL CALCULATED.3IONS-SCNC: 7 MMOL/L (ref 3–14)
AST SERPL W P-5'-P-CCNC: 22 U/L (ref 0–45)
BASOPHILS # BLD AUTO: 0 10E9/L (ref 0–0.2)
BASOPHILS NFR BLD AUTO: 0.4 %
BILIRUB SERPL-MCNC: 0.4 MG/DL (ref 0.2–1.3)
BUN SERPL-MCNC: 13 MG/DL (ref 7–30)
CALCIUM SERPL-MCNC: 8.5 MG/DL (ref 8.5–10.1)
CHLORIDE SERPL-SCNC: 103 MMOL/L (ref 94–109)
CO2 SERPL-SCNC: 26 MMOL/L (ref 20–32)
CREAT SERPL-MCNC: 0.88 MG/DL (ref 0.52–1.04)
DIFFERENTIAL METHOD BLD: NORMAL
EOSINOPHIL # BLD AUTO: 0.1 10E9/L (ref 0–0.7)
EOSINOPHIL NFR BLD AUTO: 2.2 %
ERYTHROCYTE [DISTWIDTH] IN BLOOD BY AUTOMATED COUNT: 12.9 % (ref 10–15)
ERYTHROCYTE [SEDIMENTATION RATE] IN BLOOD BY WESTERGREN METHOD: 24 MM/H (ref 0–30)
GFR SERPL CREATININE-BSD FRML MDRD: 66 ML/MIN/1.7M2
GLUCOSE SERPL-MCNC: 83 MG/DL (ref 70–99)
HCT VFR BLD AUTO: 40.5 % (ref 35–47)
HGB BLD-MCNC: 13.3 G/DL (ref 11.7–15.7)
LYMPHOCYTES # BLD AUTO: 1.5 10E9/L (ref 0.8–5.3)
LYMPHOCYTES NFR BLD AUTO: 27 %
MCH RBC QN AUTO: 29.1 PG (ref 26.5–33)
MCHC RBC AUTO-ENTMCNC: 32.8 G/DL (ref 31.5–36.5)
MCV RBC AUTO: 89 FL (ref 78–100)
MONOCYTES # BLD AUTO: 0.4 10E9/L (ref 0–1.3)
MONOCYTES NFR BLD AUTO: 7.4 %
NEUTROPHILS # BLD AUTO: 3.5 10E9/L (ref 1.6–8.3)
NEUTROPHILS NFR BLD AUTO: 63 %
PLATELET # BLD AUTO: 234 10E9/L (ref 150–450)
POTASSIUM SERPL-SCNC: 4 MMOL/L (ref 3.4–5.3)
PROT SERPL-MCNC: 7.7 G/DL (ref 6.8–8.8)
RBC # BLD AUTO: 4.57 10E12/L (ref 3.8–5.2)
SODIUM SERPL-SCNC: 136 MMOL/L (ref 133–144)
TSH SERPL DL<=0.005 MIU/L-ACNC: 1.56 MU/L (ref 0.4–4)
WBC # BLD AUTO: 5.6 10E9/L (ref 4–11)

## 2018-07-20 PROCEDURE — 85025 COMPLETE CBC W/AUTO DIFF WBC: CPT | Performed by: PHYSICIAN ASSISTANT

## 2018-07-20 PROCEDURE — 85652 RBC SED RATE AUTOMATED: CPT | Performed by: PHYSICIAN ASSISTANT

## 2018-07-20 PROCEDURE — 84443 ASSAY THYROID STIM HORMONE: CPT | Performed by: PHYSICIAN ASSISTANT

## 2018-07-20 PROCEDURE — 36415 COLL VENOUS BLD VENIPUNCTURE: CPT | Performed by: PHYSICIAN ASSISTANT

## 2018-07-20 PROCEDURE — 99214 OFFICE O/P EST MOD 30 MIN: CPT | Performed by: PHYSICIAN ASSISTANT

## 2018-07-20 PROCEDURE — 80053 COMPREHEN METABOLIC PANEL: CPT | Performed by: PHYSICIAN ASSISTANT

## 2018-07-20 PROCEDURE — 87389 HIV-1 AG W/HIV-1&-2 AB AG IA: CPT | Performed by: PHYSICIAN ASSISTANT

## 2018-07-20 RX ORDER — SERTRALINE HYDROCHLORIDE 100 MG/1
TABLET, FILM COATED ORAL
Qty: 30 TABLET | Refills: 5 | Status: SHIPPED | OUTPATIENT
Start: 2018-07-20 | End: 2018-10-05

## 2018-07-20 RX ORDER — DULOXETIN HYDROCHLORIDE 30 MG/1
CAPSULE, DELAYED RELEASE ORAL
Qty: 30 CAPSULE | Refills: 5 | Status: SHIPPED | OUTPATIENT
Start: 2018-07-20

## 2018-07-20 ASSESSMENT — PATIENT HEALTH QUESTIONNAIRE - PHQ9: 5. POOR APPETITE OR OVEREATING: NOT AT ALL

## 2018-07-20 ASSESSMENT — ANXIETY QUESTIONNAIRES
3. WORRYING TOO MUCH ABOUT DIFFERENT THINGS: SEVERAL DAYS
GAD7 TOTAL SCORE: 9
6. BECOMING EASILY ANNOYED OR IRRITABLE: NEARLY EVERY DAY
2. NOT BEING ABLE TO STOP OR CONTROL WORRYING: SEVERAL DAYS
1. FEELING NERVOUS, ANXIOUS, OR ON EDGE: MORE THAN HALF THE DAYS
5. BEING SO RESTLESS THAT IT IS HARD TO SIT STILL: SEVERAL DAYS
7. FEELING AFRAID AS IF SOMETHING AWFUL MIGHT HAPPEN: SEVERAL DAYS

## 2018-07-20 ASSESSMENT — PAIN SCALES - GENERAL: PAINLEVEL: NO PAIN (0)

## 2018-07-20 NOTE — PROGRESS NOTES
"  SUBJECTIVE:   Patricia Smith is a 58 year old female who presents to clinic today for the following health issues:  Chief Complaint   Patient presents with     Depression     Medication Reconciliation     off Zoloft for 1 month         Depression Followup    Status since last visit: Worsened seems worse since switched to the Cymbalta, is more irritable     Is sleeping better    See PHQ-9 for current symptoms.  Other associated symptoms: None    Complicating factors:   Significant life event:  Yes-  moving   Current substance abuse:  None  Anxiety or Panic symptoms:  No    PHQ-9 1/10/2018 4/6/2018 7/20/2018   Total Score 10 15 11   Q9: Suicide Ideation Not at all Not at all Several days     PHQ-9  English  PHQ-9   Any Language  Suicide Assessment Five-step Evaluation and Treatment (SAFE-T)    Amount of exercise or physical activity: None    Problems taking medications regularly: No    Medication side effects: irritability, mores restless leg sx    Sweating more    Diet: regular (no restrictions)    At last visit switched from zoloft to cymbalta due to mood and due to pain issues.  Since med switch, feels depression is better and pain \"felt really good\" but finds she is crabby, easily irritated, finds herself having to hold her tongue to not get in trouble at work.  Also sweating a lot.  When asked of thoughts of harm, she states would never hurt self, sometimes just wonders about if she weren't here.    Past meds - zoloft, wellbutrin, buspar, paxil, prozac.  On zoloft for many years.    Still getting \"hot flashes\", despite menopause for years, no recent med changes before last month.  Heavy uniforms at work.  Episodes 30-45 min, mostly in daytime, 2-3 x per day.  Recent thyroid med adjustment needing lab.    Full ROS neg except as below.      Problem list and histories reviewed & adjusted, as indicated.  Additional history: as documented    BP Readings from Last 3 Encounters:   07/20/18 122/64   06/20/18 132/80 "   06/11/18 124/74    Wt Readings from Last 3 Encounters:   07/20/18 172 lb 9.6 oz (78.3 kg)   06/20/18 170 lb (77.1 kg)   06/11/18 170 lb 12.8 oz (77.5 kg)         Labs reviewed in EPIC    Reviewed and updated as needed this visit by clinical staff  Tobacco  Allergies  Meds  Problems  Med Hx  Surg Hx  Fam Hx  Soc Hx        Reviewed and updated as needed this visit by Provider  Tobacco  Allergies  Meds  Problems  Med Hx  Surg Hx  Fam Hx  Soc Hx          ROS:  Constitutional, HEENT, cardiovascular, pulmonary, GI, , musculoskeletal, neuro, skin, endocrine and psych systems are negative, except as otherwise noted.  Dry mouth.  Allergies.  Soft tissue lump on R thigh - just noted.    OBJECTIVE:     /64  Pulse 84  Temp 97.8  F (36.6  C) (Tympanic)  Resp 18  Wt 172 lb 9.6 oz (78.3 kg)  LMP 02/24/1993  SpO2 97%  Breastfeeding? No  BMI 32.35 kg/m2  Body mass index is 32.35 kg/(m^2).  GENERAL: healthy, alert and no distress  EYES: Eyes grossly normal to inspection, PERRL and conjunctivae and sclerae normal  HENT: ear canals and TM's normal, nose and mouth without ulcers or lesions  NECK: no adenopathy, no asymmetry, masses, or scars and thyroid normal to palpation  RESP: lungs clear to auscultation - no rales, rhonchi or wheezes  CV: regular rate and rhythm, normal S1 S2, no S3 or S4, no murmur, click or rub,  ABDOMEN: soft, nontender, no hepatosplenomegaly, no masses and bowel sounds normal  MS: no gross musculoskeletal defects noted, no edema  SKIN: no suspicious lesions or rashes  NEURO: Normal strength and tone, mentation intact and speech normal  PSYCH: mentation appears normal, affect normal/bright  ASSESSMENT/PLAN:       ICD-10-CM    1. Major depressive disorder, recurrent episode, mild (H) F33.0 sertraline (ZOLOFT) 100 MG tablet     DULoxetine (CYMBALTA) 30 MG EC capsule   2. Anxiety F41.9 sertraline (ZOLOFT) 100 MG tablet     DULoxetine (CYMBALTA) 30 MG EC capsule   3. Chronic low back  pain, unspecified back pain laterality, with sciatica presence unspecified M54.5 DULoxetine (CYMBALTA) 30 MG EC capsule    G89.29    4. Diaphoresis R61 CBC with platelets and differential     TSH     Comprehensive metabolic panel     ESR: Erythrocyte sedimentation rate   5. Hypothyroidism, unspecified type E03.9    6. Localized swelling, mass, or lump of right lower extremity R22.41 US Extremity Non Vascular Right   7. Encounter for screening for HIV Z11.4 HIV Antigen Antibody Combo         Patient Instructions   Mood -  Go back to the cymbalta + zoloft stage that seemed to work for all issues   Recheck if not doing well, otherwise in 3 wks     Dry mouth-  Try decreasing gabapentin now that pain is better with having started cymbalta  Perhaps try gabapentin 2 caps morning and 3 caps evening  See me if still issues or if pain worse    Leg lump -  Call [phone] to set up your imaging testing.    Sweaty -  Could still be from meds  Labs today   Mammogram      [place] Mammo Schedule      [facility] Mammo Schedule  [facility] ~ [phone]  One Day Weekly- Alternating Days    [place] ~ [phone]  Every other Monday or Wednesday   & one Saturday morning a month    [place] ~ [phone]  Every Other Monday Morning    [place] ~ [phone]  Every Other Monday Afternoon    [place] ~ [phone]  Every Monday morning  Every Tuesday afternoon     Wed, Thurs, Friday morning & afternoon    Mammogram walk-in hours in [place]: Monday-Friday, 8 a.m. - 4 p.m.  Questions? Call [phone].        Tresa Best PA-C  [facility]

## 2018-07-20 NOTE — MR AVS SNAPSHOT
After Visit Summary   7/20/2018    Patricia Smith    MRN: [id number]           Patient Information     Date Of Birth          1960        Visit Information        Provider Department      7/20/2018 10:00 AM Tresa Best PA-C [facility]        Today's Diagnoses     Major depressive disorder, recurrent episode, mild (H)    -  1    Anxiety        Chronic low back pain, unspecified back pain laterality, with sciatica presence unspecified        Diaphoresis        Hypothyroidism, unspecified type          Care Instructions    Mood -  Go back to the cymbalta + zoloft stage that seemed to work for all issues   Recheck if not doing well, otherwise in 3 wks     Dry mouth-  Try decreasing gabapentin now that pain is better with having started cymbalta  Perhaps try gabapentin 2 caps morning and 3 caps evening  See me if still issues or if pain worse    Sweaty -  Could still be from meds  Labs today   Mammogram      [place] Mammo Schedule      [facility] Mammo Schedule  [facility] ~ [phone]  One Day Weekly- Alternating Days    [place] ~ [phone]  Every other Monday or Wednesday   & one Saturday morning a month    [place] ~ [phone]  Every Other Monday Morning    [place] ~ [phone]  Every Other Monday Afternoon    [place] ~ [phone]  Every Monday morning  Every Tuesday afternoon     Wed, Thurs, Friday morning & afternoon    Mammogram walk-in hours in [place]: Monday-Friday, 8 a.m. - 4 p.m.  Questions? Call [phone].            Follow-ups after your visit        Your next 10 appointments already scheduled     Aug 14, 2018  9:30 AM CDT   Return Visit with Leonor London MD   [facility]    [address] [place] [id number] [phone]              Who to contact     If you have questions or need follow up information about today's clinic visit or your schedule please contact  [facility] directly at [phone].  Normal or non-critical lab and imaging results will be communicated to you by MyChart, letter or phone within 4 business days after the clinic has received the results. If you do not hear from us within 7 days, please contact the clinic through [employer] or phone. If you have a critical or abnormal lab result, we will notify you by phone as soon as possible.  Submit refill requests through [employer] or call your pharmacy and they will forward the refill request to us. Please allow 3 business days for your refill to be completed.          Additional Information About Your Visit        [employer] Information     [employer] gives you secure access to your electronic health record. If you see a primary care provider, you can also send messages to your care team and make appointments. If you have questions, please call your primary care clinic.  If you do not have a primary care provider, please call [phone] and they will assist you.        Care EveryWhere ID     This is your Care EveryWhere ID. This could be used by other organizations to access your [facility] medical records  [id number]        Your Vitals Were     Pulse Temperature Respirations Last Period Pulse Oximetry Breastfeeding?    84 97.8  F (36.6  C) (Tympanic) 18 02/24/1993 97% No    BMI (Body Mass Index)                   32.35 kg/m2            Blood Pressure from Last 3 Encounters:   07/20/18 122/64   06/20/18 132/80   06/11/18 124/74    Weight from Last 3 Encounters:   07/20/18 172 lb 9.6 oz (78.3 kg)   06/20/18 170 lb (77.1 kg)   06/11/18 170 lb 12.8 oz (77.5 kg)              We Performed the Following     CBC with platelets and differential     Comprehensive metabolic panel     ESR: Erythrocyte sedimentation rate     TSH          Today's Medication Changes          These changes are accurate as of 7/20/18 10:55 AM.  If you have any questions, ask your nurse or doctor.               These medicines have  changed or have updated prescriptions.        Dose/Directions    DULoxetine 30 MG EC capsule   Commonly known as:  CYMBALTA   This may have changed:  additional instructions   Used for:  Major depressive disorder, recurrent episode, mild (H), Anxiety, Chronic low back pain, unspecified back pain laterality, with sciatica presence unspecified   Changed by:  Tresa Best PA-C        1 tab cymbalta (duloxetine) daily plus 1 tab zoloft (sertaline) 50 mg.   Quantity:  30 capsule   Refills:  5       sertraline 100 MG tablet   Commonly known as:  ZOLOFT   This may have changed:  additional instructions   Used for:  Anxiety, Major depressive disorder, recurrent episode, mild (H)   Changed by:  Tresa Best PA-C        1 tab tab zoloft (sertaline) 50 mg plus 1 tab cymbalta (duloxetine) daily.   Quantity:  30 tablet   Refills:  5            Where to get your medicines      These medications were sent to [facility] [address] [place] [address]  [address] [facility] [address]     Phone:  [phone]     DULoxetine 30 MG EC capsule    sertraline 100 MG tablet                Primary Care Provider Office Phone # Fax #    Tresa Best PA-C [phone] [address] [place] [address]        Equal Access to Services     KATTY HINTON AH: Hadii karly ku hadasho Soomaali, waaxda luqadaha, qaybta kaalmada adeegyada, gay nguyen hayteja bernard . So [facility] [phone].    ATENCIÓN: Si habla español, tiene a dillon disposición servicios gratuitos de asistencia lingüística. [facility] [phone].    We comply with applicable federal civil rights laws and [place] laws. We do not discriminate on the basis of race, color, national origin, age, disability, sex, sexual orientation, or gender identity.            Thank you!     Thank you for choosing [facility]  for your care. Our goal is always to provide you with excellent care.  Hearing back from our patients is one way we can continue to improve our services. Please take a few minutes to complete the written survey that you may receive in the mail after your visit with us. Thank you!             Your Updated Medication List - Protect others around you: Learn how to safely use, store and throw away your medicines at www.disposemymeds.org.          This list is accurate as of 7/20/18 10:55 AM.  Always use your most recent med list.                   Brand Name Dispense Instructions for use Diagnosis    albuterol 108 (90 Base) MCG/ACT Inhaler    PROAIR HFA/PROVENTIL HFA/VENTOLIN HFA    1 Inhaler    Inhale 2 puffs into the lungs every 6 hours    Mild persistent asthma without complication       baclofen 20 MG tablet    LIORESAL    60 tablet    Take 1 tablet (20 mg) by mouth 2 times daily    Chronic low back pain, unspecified back pain laterality, with sciatica presence unspecified, Muscle twitching       cyanocobalamin 1000 MCG tablet    vitamin  B-12     Take 1,000 mcg by mouth daily        DULoxetine 30 MG EC capsule    CYMBALTA    30 capsule    1 tab cymbalta (duloxetine) daily plus 1 tab zoloft (sertaline) 50 mg.    Major depressive disorder, recurrent episode, mild (H), Anxiety, Chronic low back pain, unspecified back pain laterality, with sciatica presence unspecified       fexofenadine-pseudoePHEDrine  MG per 12 hr tablet    ALLEGRA-D    60 tablet    Take 1 tablet by mouth 2 times daily    Allergic rhinitis due to pollen, unspecified chronicity, unspecified seasonality       fluticasone-salmeterol 45-21 MCG/ACT inhaler    ADVAIR-HFA    1 Inhaler    Inhale 2 puffs into the lungs 2 times daily as needed    Mild persistent asthma without complication       furosemide 40 MG tablet    LASIX    90 tablet    TAKE ONE TABLET BY MOUTH EVERY DAY    Peripheral edema       gabapentin 300 MG capsule    NEURONTIN    270 capsule    3 caps morning and 4 caps evening.    Chronic low back pain,  unspecified back pain laterality, with sciatica presence unspecified, Muscle twitching       levothyroxine 25 MCG tablet    SYNTHROID/LEVOTHROID    60 tablet    Take 1 tablet (25 mcg) by mouth daily Lab in 6-8 weeks.    Hypothyroidism, unspecified type       meloxicam 15 MG tablet    MOBIC    30 tablet    Take 1 tablet (15 mg) by mouth daily    Chronic low back pain, unspecified back pain laterality, with sciatica presence unspecified       MULTI FOR HER 50+ Caps           nystatin 965538 UNIT/GM Powd    MYCOSTATIN    30 g    Apply topically 3 times daily as needed    Yeast infection of the skin       omeprazole 20 MG CR capsule    priLOSEC    90 capsule    Take 1 capsule (20 mg) by mouth daily    Gastroesophageal reflux disease, esophagitis presence not specified       rOPINIRole 0.25 MG tablet    REQUIP    60 tablet    Take 1-2 tablets (0.25-0.5 mg) by mouth At Bedtime    Restless legs syndrome (RLS)       sertraline 100 MG tablet    ZOLOFT    30 tablet    1 tab tab zoloft (sertaline) 50 mg plus 1 tab cymbalta (duloxetine) daily.    Anxiety, Major depressive disorder, recurrent episode, mild (H)

## 2018-07-20 NOTE — PATIENT INSTRUCTIONS
Mood -  Go back to the cymbalta + zoloft stage that seemed to work for all issues   Recheck if not doing well, otherwise in 3 wks     Dry mouth-  Try decreasing gabapentin now that pain is better with having started cymbalta  Perhaps try gabapentin 2 caps morning and 3 caps evening  See me if still issues or if pain worse    Leg lump -  Call [phone] to set up your imaging testing.    Sweaty -  Could still be from meds  Labs today   Mammogram      [place] Mammo Schedule      [facility] Mammo Schedule  [facility] ~ [phone]  One Day Weekly- Alternating Days    [place] ~ [phone]  Every other Monday or Wednesday   & one Saturday morning a month    [place] ~ [phone]  Every Other Monday Morning    [place] ~ [phone]  Every Other Monday Afternoon    [place] ~ [phone]  Every Monday morning  Every Tuesday afternoon     Wed, Thurs, Friday morning & afternoon    Mammogram walk-in hours in [place]: Monday Friday, 8 a.m. - 4 p.m.  Questions? Call [phone].

## 2018-07-20 NOTE — NURSING NOTE
"Chief Complaint   Patient presents with     Depression     Refill Request       Initial LMP 02/24/1993  Breastfeeding? No Estimated body mass index is 31.86 kg/(m^2) as calculated from the following:    Height as of 4/6/18: 5' 1.25\" (1.556 m).    Weight as of 6/20/18: 170 lb (77.1 kg).      Health Maintenance that is potentially due pending provider review:  Mammogram    Gave pt phone number/pended order to schedule mammo and/or colonoscopy(or FIT)        "

## 2018-07-21 ASSESSMENT — PATIENT HEALTH QUESTIONNAIRE - PHQ9: SUM OF ALL RESPONSES TO PHQ QUESTIONS 1-9: 11

## 2018-07-21 ASSESSMENT — ANXIETY QUESTIONNAIRES: GAD7 TOTAL SCORE: 9

## 2018-07-23 LAB — HIV 1+2 AB+HIV1 P24 AG SERPL QL IA: NONREACTIVE

## 2018-07-24 RX ORDER — LEVOTHYROXINE SODIUM 25 UG/1
25 TABLET ORAL DAILY
Qty: 90 TABLET | Refills: 3 | Status: SHIPPED | OUTPATIENT
Start: 2018-07-24 | End: 2018-10-05

## 2018-07-24 NOTE — PROGRESS NOTES
Donna Gomez,    Your results were all normal.  No cause found for the sweats.  If these continue or if you get weight loss or new symptoms, we need to re-evaluate.  Otherwise I sent refills of your current (unchanged) thyroid dose.    Please contact me if you have questions.    Tresa Best PA-C

## 2018-08-09 ENCOUNTER — MYC MEDICAL ADVICE (OUTPATIENT)
Dept: FAMILY MEDICINE | Facility: CLINIC | Age: 58
End: 2018-08-09

## 2018-08-28 ENCOUNTER — OFFICE VISIT (OUTPATIENT)
Dept: FAMILY MEDICINE | Facility: CLINIC | Age: 58
End: 2018-08-28
Payer: MEDICARE

## 2018-08-28 ENCOUNTER — RADIANT APPOINTMENT (OUTPATIENT)
Dept: GENERAL RADIOLOGY | Facility: CLINIC | Age: 58
End: 2018-08-28
Attending: NURSE PRACTITIONER
Payer: MEDICARE

## 2018-08-28 VITALS
SYSTOLIC BLOOD PRESSURE: 120 MMHG | HEIGHT: 61 IN | RESPIRATION RATE: 16 BRPM | HEART RATE: 72 BPM | WEIGHT: 171 LBS | DIASTOLIC BLOOD PRESSURE: 68 MMHG | BODY MASS INDEX: 32.28 KG/M2 | TEMPERATURE: 97.1 F

## 2018-08-28 DIAGNOSIS — M79.644 PAIN OF FINGER OF RIGHT HAND: Primary | ICD-10-CM

## 2018-08-28 DIAGNOSIS — M79.644 PAIN OF FINGER OF RIGHT HAND: ICD-10-CM

## 2018-08-28 PROCEDURE — 73140 X-RAY EXAM OF FINGER(S): CPT | Mod: RT

## 2018-08-28 PROCEDURE — 99213 OFFICE O/P EST LOW 20 MIN: CPT | Performed by: NURSE PRACTITIONER

## 2018-08-28 NOTE — PATIENT INSTRUCTIONS
Finger Sprain  A sprain is a stretching or tearing of the ligaments that hold a joint together. There are no broken bones. Sprains take 3 to 6 weeks to heal.  A sprained finger may be treated with a splint or buddy tape. This is when you tape the injured finger to the one next to it for support. Minor sprains may require no additional support.  Home care    Keep your hand elevated to reduce pain and swelling. This is very important during the first 48 hours.    Apply an ice pack over the injured area for 15 to 20 minutes every 3 to 6 hours. You should do this for the first 24 to 48 hours. You can make an ice pack by filling a plastic bag that seals at the top with ice cubes and then wrapping it with a thin towel. Continue the use of ice packs for relief of pain and swelling as needed. As the ice melts, be careful to avoid getting any wrap or splint wet. After 48 hours, apply heat (warm shower or warm bath) for 15 to 20 minutes several times a day, or alternate ice and heat.    If buddy tape was applied and it becomes wet or dirty, change it. You may replace it with paper, plastic or cloth tape. Cloth tape and paper tapes must be kept dry. Apply gauze or cotton padding between the fingers, especially at the webbed space. This will help prevent the skin from getting moist and breaking down. Keep the buddy tape in place for at least 4 weeks, or as instructed by your healthcare provider.    If a splint was applied, wear it for the time advised.    You may use over-the-counter pain medicine to control pain, unless another pain medicine was prescribed. If you have chronic liver or kidney disease or ever had a stomach ulcer or GI bleeding, talk with your healthcare provider before using these medicines.  Follow-up care  Follow up with your healthcare provider as directed. Finger joints will become stiff if immobile for too long. If a splint was applied, ask your healthcare provider when it is safe to begin  range-of-motion exercises.  Sometimes fractures don t show up on the first X-ray. Bruises and sprains can sometimes hurt as much as a fracture. These injuries can take time to heal completely. If your symptoms don t improve or they get worse, talk with your healthcare provider. You may need a repeat X-ray. If X-rays were taken, you will be told of any new findings that may affect your care.  When to seek medical advice  Call your healthcare provider right away if any of these occur:    Pain or swelling increases    Fingers or hand becomes cold, blue, numb, or tingly  Date Last Reviewed: 11/20/2015 2000-2017 [employer]. [address] [place] [address], [place] [address]. All rights reserved. This information is not intended as a substitute for professional medical care. Always follow your healthcare professional's instructions.

## 2018-08-28 NOTE — PROGRESS NOTES
SUBJECTIVE:   Patricia Smith is a 58 year old female who presents to clinic today for the following health issues:      Musculoskeletal problem/pain      Duration: happened yesterday     Description  Location: right hand     Intensity:  moderate    Accompanying signs and symptoms: swelling/ unable to make fist     History  Previous similar problem: no   Previous evaluation:  none    Precipitating or alleviating factors:  Trauma or overuse: YES- was tying to shut window and gregg hand   Aggravating factors include: overuse    Therapies tried and outcome: ice    Problem list and histories reviewed & adjusted, as indicated.  Additional history: as documented    Patient Active Problem List   Diagnosis     Depression with anxiety     DJD (degenerative joint disease) of knee     Hypothyroidism     Esophageal reflux     Chronic back pain     Mild persistent asthma without complication     Anxiety     CKD (chronic kidney disease) stage 3, GFR 30-59 ml/min     Hypoxia     Leukocytosis     Past Surgical History:   Procedure Laterality Date     ABDOMEN SURGERY      See attached Sheet     APPENDECTOMY       ARTHROSCOPY ANKLE, OPEN REPAIR LIGAMENT, COMBINED Left 2017    Procedure: COMBINED ARTHROSCOPY ANKLE, OPEN REPAIR LIGAMENT;  Left ankle arthroscopic debridement, medial and lateral ligament repair and tendon evaluations.;  Surgeon: Pedro Mckeon DPM;  Location: [facility] OR     BACK SURGERY  , ,      BIOPSY       C/SECTION, CLASSICAL  x3    , Classical     ENT SURGERY       ESOPHAGOSCOPY, GASTROSCOPY, DUODENOSCOPY (EGD), COMBINED N/A 2017    Procedure: COMBINED ESOPHAGOSCOPY, GASTROSCOPY, DUODENOSCOPY (EGD);  gastroscopy;  Surgeon: Les Hartmann MD;  Location: [facility]     EYE SURGERY       HEAD & NECK SURGERY  x3     HEMORRHOIDECTOMY  2015     HYSTERECTOMY, ELI       LAMINECT/DISCECTOMY, LUMBAR  ,     Laminectomy/Discectomy Cervical     NECK SURGERY  x3     ORTHOPEDIC  SURGERY       SURGICAL HISTORY OF -   1983    bowel obstruction w/adhesions     SURGICAL HISTORY OF -  Left 2007    bone spur shoulder     TONSILLECTOMY & ADENOIDECTOMY  1963       Social History   Substance Use Topics     Smoking status: Former Smoker     Packs/day: 1.00     Years: 10.00     Types: Cigarettes     Smokeless tobacco: Never Used      Comment: started at age 19.  Smoked about 15 years     Alcohol use No     Family History   Problem Relation Age of Onset     Diabetes Mother      Depression Mother      Anxiety Disorder Mother      Asthma Mother      Obesity Mother      Hypertension Father      Hyperlipidemia Father      Prostate Cancer Father      Other Cancer Father      Skin     Other Cancer Sister      Ovarian     Coronary Artery Disease Brother      Coronary Artery Disease Brother      in hs 50s     Other Cancer Sister      Ovarian     Depression Sister      Anxiety Disorder Nephew      Anxiety Disorder Nephew      Asthma Sister      Thyroid Disease Sister      Depression Sister      Coronary Artery Disease Brother      Breast Cancer No family hx of      Colon Cancer No family hx of          Current Outpatient Prescriptions   Medication Sig Dispense Refill     albuterol (PROAIR HFA/PROVENTIL HFA/VENTOLIN HFA) 108 (90 BASE) MCG/ACT Inhaler Inhale 2 puffs into the lungs every 6 hours 1 Inhaler 3     baclofen (LIORESAL) 20 MG tablet Take 1 tablet (20 mg) by mouth 2 times daily 60 tablet 11     cyanocobalamin (VITAMIN  B-12) 1000 MCG tablet Take 1,000 mcg by mouth daily       fexofenadine-pseudoePHEDrine (ALLEGRA-D)  MG per 12 hr tablet Take 1 tablet by mouth 2 times daily 60 tablet 11     fluticasone-salmeterol (ADVAIR-HFA) 45-21 MCG/ACT inhaler Inhale 2 puffs into the lungs 2 times daily as needed 1 Inhaler 11     furosemide (LASIX) 40 MG tablet TAKE ONE TABLET BY MOUTH EVERY DAY 90 tablet 1     gabapentin (NEURONTIN) 300 MG capsule 3 caps morning and 4 caps evening. 270 capsule 5      "levothyroxine (SYNTHROID/LEVOTHROID) 25 MCG tablet Take 1 tablet (25 mcg) by mouth daily 90 tablet 3     meloxicam (MOBIC) 15 MG tablet Take 1 tablet (15 mg) by mouth daily 30 tablet 1     Multiple Vitamins-Minerals (MULTI FOR HER 50+) CAPS        nystatin (MYCOSTATIN) 912987 UNIT/GM POWD Apply topically 3 times daily as needed 30 g 1     omeprazole (PRILOSEC) 20 MG CR capsule Take 1 capsule (20 mg) by mouth daily 90 capsule 3     rOPINIRole (REQUIP) 0.25 MG tablet Take 1-2 tablets (0.25-0.5 mg) by mouth At Bedtime 60 tablet 11     sertraline (ZOLOFT) 100 MG tablet 1 tab tab zoloft (sertaline) 50 mg plus 1 tab cymbalta (duloxetine) daily. 30 tablet 5     DULoxetine (CYMBALTA) 30 MG EC capsule 1 tab cymbalta (duloxetine) daily plus 1 tab zoloft (sertaline) 50 mg. (Patient not taking: Reported on 8/28/2018) 30 capsule 5     Allergies   Allergen Reactions     Bactrim [Sulfamethoxazole W/Trimethoprim] Hives     Codeine Hives     Erythromycin GI Disturbance     Hydrocodone Hives     Lortab [Hydrocodone-Acetaminophen] Hives     Penicillins Hives     Labs reviewed in EPIC    Reviewed and updated as needed this visit by clinical staff       Reviewed and updated as needed this visit by Provider         ROS:  Constitutional, HEENT, cardiovascular, pulmonary, gi and gu systems are negative, except as otherwise noted.    OBJECTIVE:     /68 (Cuff Size: Adult Large)  Pulse 72  Temp 97.1  F (36.2  C) (Tympanic)  Resp 16  Ht 5' 1.25\" (1.556 m)  Wt 171 lb (77.6 kg)  LMP 02/24/1993  BMI 32.05 kg/m2  Body mass index is 32.05 kg/(m^2).  GENERAL: healthy, alert and no distress  MS: tenderness to palpation right index finger with tenderness and swelling present, decreased range of motion  PSYCH: mentation appears normal, affect normal/bright    Diagnostic Test Results:  Xray -   XR FINGER RT G/E 2 VW 8/28/2018 3:56 PM     HISTORY: ; Pain of finger of right hand    COMPARISON: None             Impression             " IMPRESSION: No evidence of fracture. Joint spaces are maintained.    ANOOP TRIMBLE MD      ASSESSMENT/PLAN:     1. Pain of finger of right hand  X ray without fracture present.  Rest, ice, elevate recommended for swelling and discomfort.  Symptomatic care and follow up discussed.  - XR Finger Right G/E 2 Views; Future    Home care instructions were reviewed with the patient. The risks, benefits and treatment options of prescribed medications or other treatments have been discussed with the patient. The patient verbalized their understanding and should call or follow up if no improvement or if they develop further problems.    Patient Instructions     Finger Sprain  A sprain is a stretching or tearing of the ligaments that hold a joint together. There are no broken bones. Sprains take 3 to 6 weeks to heal.  A sprained finger may be treated with a splint or buddy tape. This is when you tape the injured finger to the one next to it for support. Minor sprains may require no additional support.  Home care    Keep your hand elevated to reduce pain and swelling. This is very important during the first 48 hours.    Apply an ice pack over the injured area for 15 to 20 minutes every 3 to 6 hours. You should do this for the first 24 to 48 hours. You can make an ice pack by filling a plastic bag that seals at the top with ice cubes and then wrapping it with a thin towel. Continue the use of ice packs for relief of pain and swelling as needed. As the ice melts, be careful to avoid getting any wrap or splint wet. After 48 hours, apply heat (warm shower or warm bath) for 15 to 20 minutes several times a day, or alternate ice and heat.    If buddy tape was applied and it becomes wet or dirty, change it. You may replace it with paper, plastic or cloth tape. Cloth tape and paper tapes must be kept dry. Apply gauze or cotton padding between the fingers, especially at the webbed space. This will help prevent the skin from getting  moist and breaking down. Keep the viola tape in place for at least 4 weeks, or as instructed by your healthcare provider.    If a splint was applied, wear it for the time advised.    You may use over-the-counter pain medicine to control pain, unless another pain medicine was prescribed. If you have chronic liver or kidney disease or ever had a stomach ulcer or GI bleeding, talk with your healthcare provider before using these medicines.  Follow-up care  Follow up with your healthcare provider as directed. Finger joints will become stiff if immobile for too long. If a splint was applied, ask your healthcare provider when it is safe to begin range-of-motion exercises.  Sometimes fractures don t show up on the first X-ray. Bruises and sprains can sometimes hurt as much as a fracture. These injuries can take time to heal completely. If your symptoms don t improve or they get worse, talk with your healthcare provider. You may need a repeat X-ray. If X-rays were taken, you will be told of any new findings that may affect your care.  When to seek medical advice  Call your healthcare provider right away if any of these occur:    Pain or swelling increases    Fingers or hand becomes cold, blue, numb, or tingly  Date Last Reviewed: 11/20/2015 2000-2017 [employer]. [address] [place] [address], [place] [address]. All rights reserved. This information is not intended as a substitute for professional medical care. Always follow your healthcare professional's instructions.            CANDICE Hammonds [facility]

## 2018-08-28 NOTE — MR AVS SNAPSHOT
After Visit Summary   8/28/2018    Patricia Smith    MRN: [id number]           Patient Information     Date Of Birth          1960        Visit Information        Provider Department      8/28/2018 3:00 PM Vijaya Foster APRN [facility]        Today's Diagnoses     Pain of finger of right hand    -  1      Care Instructions      Finger Sprain  A sprain is a stretching or tearing of the ligaments that hold a joint together. There are no broken bones. Sprains take 3 to 6 weeks to heal.  A sprained finger may be treated with a splint or buddy tape. This is when you tape the injured finger to the one next to it for support. Minor sprains may require no additional support.  Home care    Keep your hand elevated to reduce pain and swelling. This is very important during the first 48 hours.    Apply an ice pack over the injured area for 15 to 20 minutes every 3 to 6 hours. You should do this for the first 24 to 48 hours. You can make an ice pack by filling a plastic bag that seals at the top with ice cubes and then wrapping it with a thin towel. Continue the use of ice packs for relief of pain and swelling as needed. As the ice melts, be careful to avoid getting any wrap or splint wet. After 48 hours, apply heat (warm shower or warm bath) for 15 to 20 minutes several times a day, or alternate ice and heat.    If buddy tape was applied and it becomes wet or dirty, change it. You may replace it with paper, plastic or cloth tape. Cloth tape and paper tapes must be kept dry. Apply gauze or cotton padding between the fingers, especially at the webbed space. This will help prevent the skin from getting moist and breaking down. Keep the buddy tape in place for at least 4 weeks, or as instructed by your healthcare provider.    If a splint was applied, wear it for the time advised.    You may use over-the-counter pain medicine to control pain, unless another pain medicine was  prescribed. If you have chronic liver or kidney disease or ever had a stomach ulcer or GI bleeding, talk with your healthcare provider before using these medicines.  Follow-up care  Follow up with your healthcare provider as directed. Finger joints will become stiff if immobile for too long. If a splint was applied, ask your healthcare provider when it is safe to begin range-of-motion exercises.  Sometimes fractures don t show up on the first X-ray. Bruises and sprains can sometimes hurt as much as a fracture. These injuries can take time to heal completely. If your symptoms don t improve or they get worse, talk with your healthcare provider. You may need a repeat X-ray. If X-rays were taken, you will be told of any new findings that may affect your care.  When to seek medical advice  Call your healthcare provider right away if any of these occur:    Pain or swelling increases    Fingers or hand becomes cold, blue, numb, or tingly  Date Last Reviewed: 11/20/2015 2000-2017 [employer]. [address] [place] [address]. All rights reserved. This information is not intended as a substitute for professional medical care. Always follow your healthcare professional's instructions.                Follow-ups after your visit        Who to contact     If you have questions or need follow up information about today's clinic visit or your schedule please contact [facility] directly at [phone].  Normal or non-critical lab and imaging results will be communicated to you by MyChart, letter or phone within 4 business days after the clinic has received the results. If you do not hear from us within 7 days, please contact the clinic through MyChart or phone. If you have a critical or abnormal lab result, we will notify you by phone as soon as possible.  Submit refill requests through [employer] or call your pharmacy and they will forward the refill request to us. Please allow 3 business  "days for your refill to be completed.          Additional Information About Your Visit        MyChart Information     [employer] gives you secure access to your electronic health record. If you see a primary care provider, you can also send messages to your care team and make appointments. If you have questions, please call your primary care clinic.  If you do not have a primary care provider, please call [phone] and they will assist you.        Care EveryWhere ID     This is your Care EveryWhere ID. This could be used by other organizations to access your [facility] medical records  [id number]        Your Vitals Were     Pulse Temperature Respirations Height Last Period BMI (Body Mass Index)    72 97.1  F (36.2  C) (Tympanic) 16 5' 1.25\" (1.556 m) 02/24/1993 32.05 kg/m2       Blood Pressure from Last 3 Encounters:   08/28/18 120/68   07/20/18 122/64   06/20/18 132/80    Weight from Last 3 Encounters:   08/28/18 171 lb (77.6 kg)   07/20/18 172 lb 9.6 oz (78.3 kg)   06/20/18 170 lb (77.1 kg)               Primary Care Provider Office Phone # Fax #    Tresa Best PA-C [phone] [address] [place] [address]        Equal Access to Services     KATTY HINTON : Hadii karly ku hadasho Soomaali, waaxda luqadaha, qaybta kaalmada adeegyada, gay luna. So [facility] [phone].    ATENCIÓN: Si habla español, tiene a dillon disposición servicios gratuitos de asistencia lingüística. Llame al [phone].    We comply with applicable federal civil rights laws and [place] laws. We do not discriminate on the basis of race, color, national origin, age, disability, sex, sexual orientation, or gender identity.            Thank you!     Thank you for choosing [facility]  for your care. Our goal is always to provide you with excellent care. Hearing back from our patients is one way we can continue to improve our services. Please take a few minutes to " complete the written survey that you may receive in the mail after your visit with us. Thank you!             Your Updated Medication List - Protect others around you: Learn how to safely use, store and throw away your medicines at [email or web address].          This list is accurate as of 8/28/18  3:37 PM.  Always use your most recent med list.                   Brand Name Dispense Instructions for use Diagnosis    albuterol 108 (90 Base) MCG/ACT inhaler    PROAIR HFA/PROVENTIL HFA/VENTOLIN HFA    1 Inhaler    Inhale 2 puffs into the lungs every 6 hours    Mild persistent asthma without complication       baclofen 20 MG tablet    LIORESAL    60 tablet    Take 1 tablet (20 mg) by mouth 2 times daily    Chronic low back pain, unspecified back pain laterality, with sciatica presence unspecified, Muscle twitching       cyanocobalamin 1000 MCG tablet    vitamin  B-12     Take 1,000 mcg by mouth daily        DULoxetine 30 MG EC capsule    CYMBALTA    30 capsule    1 tab cymbalta (duloxetine) daily plus 1 tab zoloft (sertaline) 50 mg.    Major depressive disorder, recurrent episode, mild (H), Anxiety, Chronic low back pain, unspecified back pain laterality, with sciatica presence unspecified       fexofenadine-pseudoePHEDrine  MG per 12 hr tablet    ALLEGRA-D    60 tablet    Take 1 tablet by mouth 2 times daily    Allergic rhinitis due to pollen, unspecified chronicity, unspecified seasonality       fluticasone-salmeterol 45-21 MCG/ACT inhaler    ADVAIR-HFA    1 Inhaler    Inhale 2 puffs into the lungs 2 times daily as needed    Mild persistent asthma without complication       furosemide 40 MG tablet    LASIX    90 tablet    TAKE ONE TABLET BY MOUTH EVERY DAY    Peripheral edema       gabapentin 300 MG capsule    NEURONTIN    270 capsule    3 caps morning and 4 caps evening.    Chronic low back pain, unspecified back pain laterality, with sciatica presence unspecified, Muscle twitching       levothyroxine 25 MCG  tablet    SYNTHROID/LEVOTHROID    90 tablet    Take 1 tablet (25 mcg) by mouth daily    Hypothyroidism, unspecified type       meloxicam 15 MG tablet    MOBIC    30 tablet    Take 1 tablet (15 mg) by mouth daily    Chronic low back pain, unspecified back pain laterality, with sciatica presence unspecified       MULTI FOR HER 50+ Caps           nystatin [id number] UNIT/GM Powd    MYCOSTATIN    30 g    Apply topically 3 times daily as needed    Yeast infection of the skin       omeprazole 20 MG CR capsule    priLOSEC    90 capsule    Take 1 capsule (20 mg) by mouth daily    Gastroesophageal reflux disease, esophagitis presence not specified       rOPINIRole 0.25 MG tablet    REQUIP    60 tablet    Take 1-2 tablets (0.25-0.5 mg) by mouth At Bedtime    Restless legs syndrome (RLS)       sertraline 100 MG tablet    ZOLOFT    30 tablet    1 tab tab zoloft (sertaline) 50 mg plus 1 tab cymbalta (duloxetine) daily.    Anxiety, Major depressive disorder, recurrent episode, mild (H)

## 2018-10-05 ENCOUNTER — MYC REFILL (OUTPATIENT)
Dept: FAMILY MEDICINE | Facility: CLINIC | Age: 58
End: 2018-10-05

## 2018-10-05 DIAGNOSIS — R60.0 PERIPHERAL EDEMA: ICD-10-CM

## 2018-10-05 DIAGNOSIS — R25.3 MUSCLE TWITCHING: ICD-10-CM

## 2018-10-05 DIAGNOSIS — J45.30 MILD PERSISTENT ASTHMA WITHOUT COMPLICATION: ICD-10-CM

## 2018-10-05 DIAGNOSIS — J30.1 ALLERGIC RHINITIS DUE TO POLLEN, UNSPECIFIED SEASONALITY: ICD-10-CM

## 2018-10-05 DIAGNOSIS — M54.5 CHRONIC LOW BACK PAIN, UNSPECIFIED BACK PAIN LATERALITY, WITH SCIATICA PRESENCE UNSPECIFIED: ICD-10-CM

## 2018-10-05 DIAGNOSIS — F41.9 ANXIETY: ICD-10-CM

## 2018-10-05 DIAGNOSIS — E03.9 HYPOTHYROIDISM, UNSPECIFIED TYPE: Chronic | ICD-10-CM

## 2018-10-05 DIAGNOSIS — G89.29 CHRONIC LOW BACK PAIN, UNSPECIFIED BACK PAIN LATERALITY, WITH SCIATICA PRESENCE UNSPECIFIED: ICD-10-CM

## 2018-10-05 DIAGNOSIS — K21.9 GASTROESOPHAGEAL REFLUX DISEASE, ESOPHAGITIS PRESENCE NOT SPECIFIED: ICD-10-CM

## 2018-10-05 DIAGNOSIS — G25.81 RESTLESS LEGS SYNDROME (RLS): ICD-10-CM

## 2018-10-05 DIAGNOSIS — F33.0 MAJOR DEPRESSIVE DISORDER, RECURRENT EPISODE, MILD (H): ICD-10-CM

## 2018-10-08 NOTE — TELEPHONE ENCOUNTER
[facility]  Original authorizing provider: MD Patricia Islas would like a refill of the following medications:  fexofenadine-pseudoePHEDrine (ALLEGRA-D)  MG per 12 hr tablet [Coy Conley MD]    Preferred pharmacy: Other - [facility], [place] [phone]    Comment:      Medication renewals requested in this message routed to other providers:  omeprazole (PRILOSEC) 20 MG CR capsule [Tresa Best PA-C]  rOPINIRole (REQUIP) 0.25 MG tablet [Tresa Best PA-C]  baclofen (LIORESAL) 20 MG tablet [Tresa Best PA-C]  fluticasone-salmeterol (ADVAIR-HFA) 45-21 MCG/ACT inhaler [Tresa Best PA-C]  albuterol (PROAIR HFA/PROVENTIL HFA/VENTOLIN HFA) 108 (90 BASE) MCG/ACT Inhaler [Tresa Best PA-C]  furosemide (LASIX) 40 MG tablet [Tresa Best PA-C]  meloxicam (MOBIC) 15 MG tablet [Tresa Best PA-C]  gabapentin (NEURONTIN) 300 MG capsule [Tresa Best PA-C]  sertraline (ZOLOFT) 100 MG tablet [Tresa Best PA-C]  levothyroxine (SYNTHROID/LEVOTHROID) 25 MCG tablet [Tresa Best PA-C]

## 2018-10-08 NOTE — TELEPHONE ENCOUNTER
[facility]  Original authorizing provider: RANDELL Corona Luis would like a refill of the following medications:  omeprazole (PRILOSEC) 20 MG CR capsule [Tresa Best PA-C]  rOPINIRole (REQUIP) 0.25 MG tablet [Tresa Best PA-C]  baclofen (LIORESAL) 20 MG tablet [Tresa Best PA-C]  fluticasone-salmeterol (ADVAIR-HFA) 45-21 MCG/ACT inhaler [Tresa Best PA-C]  albuterol (PROAIR HFA/PROVENTIL HFA/VENTOLIN HFA) 108 (90 BASE) MCG/ACT Inhaler [Tresa Best PA-C]  furosemide (LASIX) 40 MG tablet [Tresa Best PA-C]  meloxicam (MOBIC) 15 MG tablet [Tresa Best PA-C]  gabapentin (NEURONTIN) 300 MG capsule [Tresa Best PA-C]  sertraline (ZOLOFT) 100 MG tablet [Tresa Best PA-C]  levothyroxine (SYNTHROID/LEVOTHROID) 25 MCG tablet [Tresa Best PA-C]    Preferred pharmacy: Other - [facility], [place] [phone]    Comment:      Medication renewals requested in this message routed to other providers:  fexofenadine-pseudoePHEDrine (ALLEGRA-D)  MG per 12 hr tablet [Coy Conley MD]

## 2018-10-10 ENCOUNTER — MYC MEDICAL ADVICE (OUTPATIENT)
Dept: FAMILY MEDICINE | Facility: CLINIC | Age: 58
End: 2018-10-10

## 2018-10-10 RX ORDER — FEXOFENADINE HCL AND PSEUDOEPHEDRINE HCI 60; 120 MG/1; MG/1
1 TABLET, EXTENDED RELEASE ORAL 2 TIMES DAILY
Qty: 60 TABLET | Refills: 2 | Status: SHIPPED | OUTPATIENT
Start: 2018-10-10 | End: 2019-01-21

## 2018-10-11 RX ORDER — MELOXICAM 15 MG/1
15 TABLET ORAL DAILY
Qty: 90 TABLET | Refills: 0 | Status: SHIPPED | OUTPATIENT
Start: 2018-10-11 | End: 2019-01-21

## 2018-10-11 RX ORDER — ALBUTEROL SULFATE 90 UG/1
2 AEROSOL, METERED RESPIRATORY (INHALATION) EVERY 6 HOURS
Qty: 3 INHALER | Refills: 0 | Status: SHIPPED | OUTPATIENT
Start: 2018-10-11 | End: 2020-03-16

## 2018-10-11 RX ORDER — GABAPENTIN 300 MG/1
CAPSULE ORAL
Qty: 630 CAPSULE | Refills: 0 | Status: SHIPPED | OUTPATIENT
Start: 2018-10-11 | End: 2019-01-12

## 2018-10-11 RX ORDER — SERTRALINE HYDROCHLORIDE 100 MG/1
TABLET, FILM COATED ORAL
Qty: 90 TABLET | Refills: 0 | Status: SHIPPED | OUTPATIENT
Start: 2018-10-11 | End: 2019-01-21

## 2018-10-11 RX ORDER — ROPINIROLE 0.25 MG/1
.25-.5 TABLET, FILM COATED ORAL AT BEDTIME
Qty: 180 TABLET | Refills: 0 | Status: SHIPPED | OUTPATIENT
Start: 2018-10-11 | End: 2019-04-08

## 2018-10-11 RX ORDER — FLUTICASONE PROPIONATE AND SALMETEROL XINAFOATE 45; 21 UG/1; UG/1
2 AEROSOL, METERED RESPIRATORY (INHALATION) 2 TIMES DAILY PRN
Qty: 3 INHALER | Refills: 0 | Status: SHIPPED | OUTPATIENT
Start: 2018-10-11 | End: 2023-02-20

## 2018-10-11 RX ORDER — BACLOFEN 20 MG/1
20 TABLET ORAL 2 TIMES DAILY
Qty: 180 TABLET | Refills: 0 | Status: SHIPPED | OUTPATIENT
Start: 2018-10-11 | End: 2019-01-12

## 2018-10-11 RX ORDER — LEVOTHYROXINE SODIUM 25 UG/1
25 TABLET ORAL DAILY
Qty: 90 TABLET | Refills: 0 | Status: SHIPPED | OUTPATIENT
Start: 2018-10-11 | End: 2019-01-21

## 2018-10-11 RX ORDER — FUROSEMIDE 40 MG
TABLET ORAL
Qty: 90 TABLET | Refills: 0 | Status: SHIPPED | OUTPATIENT
Start: 2018-10-11 | End: 2019-01-21

## 2018-10-31 ENCOUNTER — MYC MEDICAL ADVICE (OUTPATIENT)
Dept: OTHER | Age: 58
End: 2018-10-31

## 2019-01-12 DIAGNOSIS — M54.5 CHRONIC LOW BACK PAIN, UNSPECIFIED BACK PAIN LATERALITY, WITH SCIATICA PRESENCE UNSPECIFIED: ICD-10-CM

## 2019-01-12 DIAGNOSIS — G89.29 CHRONIC LOW BACK PAIN, UNSPECIFIED BACK PAIN LATERALITY, WITH SCIATICA PRESENCE UNSPECIFIED: ICD-10-CM

## 2019-01-12 DIAGNOSIS — R25.3 MUSCLE TWITCHING: ICD-10-CM

## 2019-01-13 NOTE — TELEPHONE ENCOUNTER
baclofen (LIORESAL) 20 MG tablet   Last Written Prescription Date:  10/11/18  Last Fill Quantity: 180,   # refills: 0  Last Office Visit: 7/20/18  Future Office visit:       Routing refill request to provider for review/approval because:  Drug not on the [facility], [facility] or [facility] refill protocol or controlled substance    gabapentin (NEURONTIN) 300 MG capsule    Last Written Prescription Date:  10/11/18  Last Fill Quantity: 630,   # refills: 0  Last Office Visit: 7/20/18  Future Office visit:       Routing refill request to provider for review/approval because:  Drug not on the [facility], [facility] or [facility] refill protocol or controlled substance

## 2019-01-14 RX ORDER — GABAPENTIN 300 MG/1
CAPSULE ORAL
Qty: 630 CAPSULE | Refills: 0 | Status: SHIPPED | OUTPATIENT
Start: 2019-01-14

## 2019-01-14 RX ORDER — BACLOFEN 20 MG/1
TABLET ORAL
Qty: 180 TABLET | Refills: 0 | Status: SHIPPED | OUTPATIENT
Start: 2019-01-14

## 2019-01-21 DIAGNOSIS — E03.9 HYPOTHYROIDISM, UNSPECIFIED TYPE: Chronic | ICD-10-CM

## 2019-01-21 DIAGNOSIS — G89.29 CHRONIC LOW BACK PAIN, UNSPECIFIED BACK PAIN LATERALITY, WITH SCIATICA PRESENCE UNSPECIFIED: ICD-10-CM

## 2019-01-21 DIAGNOSIS — R60.0 PERIPHERAL EDEMA: ICD-10-CM

## 2019-01-21 DIAGNOSIS — M54.5 CHRONIC LOW BACK PAIN, UNSPECIFIED BACK PAIN LATERALITY, WITH SCIATICA PRESENCE UNSPECIFIED: ICD-10-CM

## 2019-01-21 DIAGNOSIS — F41.9 ANXIETY: ICD-10-CM

## 2019-01-21 DIAGNOSIS — F33.0 MAJOR DEPRESSIVE DISORDER, RECURRENT EPISODE, MILD (H): ICD-10-CM

## 2019-01-21 DIAGNOSIS — J30.1 ALLERGIC RHINITIS DUE TO POLLEN, UNSPECIFIED SEASONALITY: ICD-10-CM

## 2019-01-21 RX ORDER — FUROSEMIDE 40 MG
TABLET ORAL
Qty: 90 TABLET | Refills: 0 | Status: SHIPPED | OUTPATIENT
Start: 2019-01-21

## 2019-01-21 RX ORDER — SERTRALINE HYDROCHLORIDE 100 MG/1
TABLET, FILM COATED ORAL
Qty: 90 TABLET | Refills: 0 | Status: SHIPPED | OUTPATIENT
Start: 2019-01-21 | End: 2019-04-21

## 2019-01-21 RX ORDER — LEVOTHYROXINE SODIUM 25 UG/1
TABLET ORAL
Qty: 90 TABLET | Refills: 0 | Status: SHIPPED | OUTPATIENT
Start: 2019-01-21 | End: 2019-04-21

## 2019-01-21 RX ORDER — MELOXICAM 15 MG/1
TABLET ORAL
Qty: 90 TABLET | Refills: 0 | Status: SHIPPED | OUTPATIENT
Start: 2019-01-21

## 2019-01-21 RX ORDER — FEXOFENADINE HYDROCHLORIDE AND PSEUDOEPHEDRINE HYDROCHLORIDE 60; 120 MG/1; MG/1
TABLET, FILM COATED, EXTENDED RELEASE ORAL
Qty: 60 TABLET | Refills: 2 | Status: SHIPPED | OUTPATIENT
Start: 2019-01-21

## 2019-04-08 DIAGNOSIS — G25.81 RESTLESS LEGS SYNDROME (RLS): ICD-10-CM

## 2019-04-08 RX ORDER — ROPINIROLE 0.25 MG/1
TABLET, FILM COATED ORAL
Qty: 180 TABLET | Refills: 0 | Status: SHIPPED | OUTPATIENT
Start: 2019-04-08 | End: 2019-11-19

## 2019-04-08 NOTE — TELEPHONE ENCOUNTER
Routing refill request to provider for review/approval because: Failed - Recent (6 mo) or future (30 days) visit within the authorizing provider's specialty    Halina SAMUEL RN

## 2019-04-08 NOTE — TELEPHONE ENCOUNTER
"Requested Prescriptions   Pending Prescriptions Disp Refills     rOPINIRole (REQUIP) 0.25 MG tablet [Pharmacy Med Name: ROPINIROLE 0.25MG   TAB] 180 tablet 0     Sig: TAKE 1 TO 2 TABLETS BY MOUTH AT BEDTIME       Antiparkinson's Agents Protocol Failed - 4/8/2019  4:30 AM        Failed - Recent (6 mo) or future (30 days) visit within the authorizing provider's specialty     Patient had office visit in the last 6 months or has a visit in the next 30 days with authorizing provider or within the authorizing provider's specialty.  See \"Patient Info\" tab in inbasket, or \"Choose Columns\" in Meds & Orders section of the refill encounter.            Passed - Blood pressure under 140/90 in past 12 months     BP Readings from Last 3 Encounters:   08/28/18 120/68   07/20/18 122/64   06/20/18 132/80                 Passed - CBC on record in past 12 months     Recent Labs   Lab Test 07/20/18  1102   WBC 5.6   RBC 4.57   HGB 13.3   HCT 40.5                    Passed - ALT on record in past 12 months         Recent Labs   Lab Test 07/20/18  1102   ALT 32             Passed - Serum Creatinine on file in past 12 months     Recent Labs   Lab Test 07/20/18  1102  07/11/17  0958   CR 0.88   < >  --    CREAT  --   --  1.1*    < > = values in this interval not displayed.             Passed - Medication is active on med list        Passed - Patient is age 18 or older        Passed - No active pregnancy on record        Passed - No positive pregnancy test in the past 12 months        Last Written Prescription Date:  10/11/18  Last Fill Quantity: 180,  # refills: 0   Last office visit: 8/28/2018 with prescribing provider:     Future Office Visit:      "

## 2019-04-21 DIAGNOSIS — F41.9 ANXIETY: ICD-10-CM

## 2019-04-21 DIAGNOSIS — F33.0 MAJOR DEPRESSIVE DISORDER, RECURRENT EPISODE, MILD (H): ICD-10-CM

## 2019-04-21 DIAGNOSIS — E03.9 HYPOTHYROIDISM, UNSPECIFIED TYPE: Chronic | ICD-10-CM

## 2019-04-22 RX ORDER — LEVOTHYROXINE SODIUM 25 UG/1
TABLET ORAL
Qty: 90 TABLET | Refills: 0 | Status: SHIPPED | OUTPATIENT
Start: 2019-04-22 | End: 2019-07-23

## 2019-04-22 RX ORDER — SERTRALINE HYDROCHLORIDE 100 MG/1
TABLET, FILM COATED ORAL
Qty: 90 TABLET | Refills: 0 | Status: SHIPPED | OUTPATIENT
Start: 2019-04-22 | End: 2019-05-08

## 2019-04-22 NOTE — TELEPHONE ENCOUNTER
"Requested Prescriptions   Pending Prescriptions Disp Refills     sertraline (ZOLOFT) 100 MG tablet [Pharmacy Med Name: SERTRALINE 100MG TAB]  Last Written Prescription Date:  1/21/19  Last Fill Quantity: 90,  # refills: 0   Last office visit: 8/28/2018 with prescribing provider:  Cristian   Future Office Visit:     90 tablet 0     Sig: TAKE 1 TABLET BY MOUTH ONCE DAILY PLUS  1  DULOXETINE  ONCE  DAILY       SSRIs Protocol Failed - 4/21/2019  3:02 PM        Failed - PHQ-9 score less than 5 in past 6 months     Please review last PHQ-9 score.           Failed - Recent (6 mo) or future (30 days) visit within the authorizing provider's specialty     Patient had office visit in the last 6 months or has a visit in the next 30 days with authorizing provider or within the authorizing provider's specialty.  See \"Patient Info\" tab in inbasket, or \"Choose Columns\" in Meds & Orders section of the refill encounter.            Passed - Medication is active on med list        Passed - Patient is age 18 or older        Passed - No active pregnancy on record        Passed - No positive pregnancy test in last 12 months        levothyroxine (SYNTHROID/LEVOTHROID) 25 MCG tablet [Pharmacy Med Name: LEVOTHYROXIN 25MCG TAB]  Last Written Prescription Date:  1/21/19  Last Fill Quantity: 90,  # refills: 0   Last office visit: 8/28/2018 with prescribing provider:  Cristian   Future Office Visit:     90 tablet 0     Sig: TAKE 1 TABLET BY MOUTH ONCE DAILY       Thyroid Protocol Passed - 4/21/2019  3:02 PM        Passed - Patient is 12 years or older        Passed - Recent (12 mo) or future (30 days) visit within the authorizing provider's specialty     Patient had office visit in the last 12 months or has a visit in the next 30 days with authorizing provider or within the authorizing provider's specialty.  See \"Patient Info\" tab in inbasket, or \"Choose Columns\" in Meds & Orders section of the refill encounter.              Passed - Medication is " active on med list        Passed - Normal TSH on file in past 12 months     Recent Labs   Lab Test 07/20/18  1102   TSH 1.56              Passed - No active pregnancy on record     If patient is pregnant or has had a positive pregnancy test, please check TSH.          Passed - No positive pregnancy test in past 12 months     If patient is pregnant or has had a positive pregnancy test, please check TSH.

## 2019-05-08 DIAGNOSIS — F33.0 MAJOR DEPRESSIVE DISORDER, RECURRENT EPISODE, MILD (H): ICD-10-CM

## 2019-05-08 DIAGNOSIS — F41.9 ANXIETY: ICD-10-CM

## 2019-05-08 NOTE — TELEPHONE ENCOUNTER
"Requested Prescriptions   Pending Prescriptions Disp Refills     sertraline (ZOLOFT) 100 MG tablet [Pharmacy Med Name: SERTRALINE 100MG TAB] 90 tablet 0     Sig: TAKE 1 TABLET BY MOUTH ONCE DAILY PLUS  1  DULOXETINE  ONCE  DAILY       SSRIs Protocol Failed - 5/8/2019 12:58 PM        Failed - PHQ-9 score less than 5 in past 6 months     Please review last PHQ-9 score.           Failed - Recent (6 mo) or future (30 days) visit within the authorizing provider's specialty     Patient had office visit in the last 6 months or has a visit in the next 30 days with authorizing provider or within the authorizing provider's specialty.  See \"Patient Info\" tab in inbasket, or \"Choose Columns\" in Meds & Orders section of the refill encounter.            Passed - Medication is active on med list        Passed - Patient is age 18 or older        Passed - No active pregnancy on record        Passed - No positive pregnancy test in last 12 months        Last Written Prescription Date:  4/22/19  Last Fill Quantity: 90,  # refills: 0   Last office visit: 8/28/2018 with prescribing provider:     Future Office Visit:      "

## 2019-05-09 RX ORDER — SERTRALINE HYDROCHLORIDE 100 MG/1
TABLET, FILM COATED ORAL
Qty: 30 TABLET | Refills: 0 | Status: SHIPPED | OUTPATIENT
Start: 2019-05-09

## 2019-07-23 DIAGNOSIS — E03.9 HYPOTHYROIDISM, UNSPECIFIED TYPE: Chronic | ICD-10-CM

## 2019-07-23 NOTE — TELEPHONE ENCOUNTER
Routing refill request to provider for review/approval because:  Labs not current:  TSH    Halina SAMUEL RN

## 2019-07-23 NOTE — TELEPHONE ENCOUNTER
"Requested Prescriptions   Pending Prescriptions Disp Refills     levothyroxine (SYNTHROID/LEVOTHROID) 25 MCG tablet [Pharmacy Med Name: LEVOTHYROXIN 25MCG TAB] 90 tablet 0     Sig: TAKE 1 TABLET BY MOUTH ONCE DAILY       Thyroid Protocol Failed - 7/23/2019 12:57 PM        Failed - Normal TSH on file in past 12 months     Recent Labs   Lab Test 07/20/18  1102   TSH 1.56              Passed - Patient is 12 years or older        Passed - Recent (12 mo) or future (30 days) visit within the authorizing provider's specialty     Patient had office visit in the last 12 months or has a visit in the next 30 days with authorizing provider or within the authorizing provider's specialty.  See \"Patient Info\" tab in inbasket, or \"Choose Columns\" in Meds & Orders section of the refill encounter.              Passed - Medication is active on med list        Passed - No active pregnancy on record     If patient is pregnant or has had a positive pregnancy test, please check TSH.          Passed - No positive pregnancy test in past 12 months     If patient is pregnant or has had a positive pregnancy test, please check TSH.          Last Written Prescription Date:  4/22/19  Last Fill Quantity: 30,  # refills: 0   Last office visit: 8/28/2018 with prescribing provider:     Future Office Visit:      "

## 2019-07-23 NOTE — LETTER
[facility]  [address] [place] [address]  Phone: [phone]  Fax: [phone]        July 26, 2019      Patricia Smith                                                                                                                                [address] [facility] [address]            Dear Ms. Luis,    We are concerned about your health care.  We recently provided you with a medication refill.  Many medications require routine follow-up with your Doctor.      At this time we ask that: You schedule a routine office visit with your physician to follow your medication Refills for your Thyroid Medication     Your prescription: Has been refilled for 1 month so you may have time for the above noted follow-up.  Unable to reach by phone. Home phone number not in service. [employer] not available at this time.       Thank you,      Tresa Best PA-C/ avelina

## 2019-07-25 RX ORDER — LEVOTHYROXINE SODIUM 25 UG/1
TABLET ORAL
Qty: 30 TABLET | Refills: 0 | Status: SHIPPED | OUTPATIENT
Start: 2019-07-25

## 2019-07-26 NOTE — TELEPHONE ENCOUNTER
Letter Mailed. Attempted to call home phone not in service. [employer] not available.  [facility]

## 2019-11-19 DIAGNOSIS — G25.81 RESTLESS LEGS SYNDROME (RLS): ICD-10-CM

## 2019-11-19 RX ORDER — ROPINIROLE 0.25 MG/1
TABLET, FILM COATED ORAL
Qty: 30 TABLET | Refills: 0 | Status: SHIPPED | OUTPATIENT
Start: 2019-11-19

## 2019-11-19 NOTE — TELEPHONE ENCOUNTER
"Requested Prescriptions   Pending Prescriptions Disp Refills     rOPINIRole (REQUIP) 0.25 MG tablet [Pharmacy Med Name: ROPINIROLE 0.25MG   TAB] 180 tablet 0     Sig: TAKE 1 TO 2 TABLETS BY MOUTH AT BEDTIME       Antiparkinson's Agents Protocol Failed - 11/19/2019  4:30 AM        Failed - Blood pressure under 140/90 in past 12 months     BP Readings from Last 3 Encounters:   08/28/18 120/68   07/20/18 122/64   06/20/18 132/80                 Failed - CBC on record in past 12 months     Recent Labs   Lab Test 07/20/18  1102   WBC 5.6   RBC 4.57   HGB 13.3   HCT 40.5                    Failed - ALT on record in past 12 months         Recent Labs   Lab Test 07/20/18  1102   ALT 32             Failed - Serum Creatinine on file in past 12 months     Recent Labs   Lab Test 07/20/18  1102  07/11/17  0958   CR 0.88   < >  --    CREAT  --   --  1.1*    < > = values in this interval not displayed.             Failed - Recent (6 mo) or future (30 days) visit within the authorizing provider's specialty     Patient had office visit in the last 6 months or has a visit in the next 30 days with authorizing provider or within the authorizing provider's specialty.  See \"Patient Info\" tab in inbasket, or \"Choose Columns\" in Meds & Orders section of the refill encounter.            Passed - Medication is active on med list        Passed - Patient is age 18 or older        Passed - No active pregnancy on record        Passed - No positive pregnancy test in the past 12 months      rOPINIRole (REQUIP) 0.25 MG tablet  Last Written Prescription Date:  04/08/2019  Last Fill Quantity: 180 tablet,  # refills: 0   Last office visit: 8/28/2018 with prescribing provider:  CESARIO Foster   Future Office Visit:      Sunitha CARNES (R) (M)      "

## 2020-03-10 ENCOUNTER — HEALTH MAINTENANCE LETTER (OUTPATIENT)
Age: 60
End: 2020-03-10

## 2020-03-12 DIAGNOSIS — J45.30 MILD PERSISTENT ASTHMA WITHOUT COMPLICATION: ICD-10-CM

## 2020-03-12 NOTE — TELEPHONE ENCOUNTER
"Requested Prescriptions   Pending Prescriptions Disp Refills     albuterol (PROAIR HFA/PROVENTIL HFA/VENTOLIN HFA) 108 (90 Base) MCG/ACT inhaler [Pharmacy Med Name: Albuterol Sulfate  (90 Base) MCG/ACT Inhalation Aerosol Solution] 9 g 0     Sig: INHALE 2 PUFFS INTO LUNGS EVERY 6 HOURS       Asthma Maintenance Inhalers - Anticholinergics Failed - 3/12/2020 10:28 AM        Failed - Asthma control assessment score within normal limits in last 6 months     Please review ACT score.           Failed - Recent (6 mo) or future (30 days) visit within the authorizing provider's specialty     Patient had office visit in the last 6 months or has a visit in the next 30 days with authorizing provider or within the authorizing provider's specialty.  See \"Patient Info\" tab in inbasket, or \"Choose Columns\" in Meds & Orders section of the refill encounter.            Passed - Patient is age 12 years or older        Passed - Medication is active on med list       Short-Acting Beta Agonist Inhalers Protocol  Failed - 3/12/2020 10:28 AM        Failed - Asthma control assessment score within normal limits in last 6 months     Please review ACT score.           Failed - Recent (6 mo) or future (30 days) visit within the authorizing provider's specialty     Patient had office visit in the last 6 months or has a visit in the next 30 days with authorizing provider or within the authorizing provider's specialty.  See \"Patient Info\" tab in inbasket, or \"Choose Columns\" in Meds & Orders section of the refill encounter.            Passed - Patient is age 12 or older        Passed - Medication is active on med list           Last Written Prescription Date:  10/11/18  Last Fill Quantity: 3,  # refills: 0   Last office visit: 8/28/2018 with prescribing provider:     Future Office Visit:      ACT Total Scores 9/14/2017 1/10/2018 6/20/2018   ACT TOTAL SCORE (Goal Greater than or Equal to 20) 24 25 22   In the past 12 months, how many times did " you visit the emergency room for your asthma without being admitted to the hospital? 0 0 0   In the past 12 months, how many times were you hospitalized overnight because of your asthma? 0 0 0

## 2020-03-15 NOTE — TELEPHONE ENCOUNTER
This refill requires provider completion per RN guidelines.  Last seen 4/2219  Leonora Monteiro RN

## 2020-03-16 RX ORDER — ALBUTEROL SULFATE 90 UG/1
2 AEROSOL, METERED RESPIRATORY (INHALATION) EVERY 4 HOURS PRN
Qty: 18 G | Refills: 0 | Status: SHIPPED | OUTPATIENT
Start: 2020-03-16

## 2020-09-15 NOTE — PROGRESS NOTES
" 03/02/18 1300   General Information   Type of Visit Initial OP Ortho PT Evaluation   Start of Care Date 03/02/18   Referring Physician Linda   Patient/Family Goals Statement standing, walking, down the stairs   Orders Evaluate and Treat   Date of Order 02/21/18   Insurance Type Medicare   Medical Diagnosis Post Op L Ankle Surgery   Surgical/Medical history reviewed Yes   Precautions/Limitations no known precautions/limitations   Body Part(s)   Body Part(s) Ankle/Foot   Presentation and Etiology   Pertinent history of current problem (include personal factors and/or comorbidities that impact the POC) \"Neet-zee\" nickname- pt received surgery for multiple ligament tears in 9/28/2017 and has since had some pain with walking and weakness. pt states her toes are red all the time, pt states she is unsure of when it started. pt reports numbness into all of the toes and senstivity. 2 lumbar surgeries, 1 cervical fusion surgery. pt is unsure why her toes are numb, red or hot. pt says MD is thinking PT will help with this.   Impairments C. Swelling;E. Decreased flexibility;G. Impaired balance;H. Impaired gait;J. Burning;K. Numbness;L. Tingling   Functional Limitations perform activities of daily living;perform desired leisure / sports activities   Symptom Location L Ankle   How/Where did it occur Other  (post op)   Onset date of current episode/exacerbation 09/28/17   Chronicity Recurrent   Pain rating (0-10 point scale) Best (/10);Worst (/10)   Best (/10) 1   Worst (/10) 7   Pain quality A. Sharp;D. Burning   Frequency of pain/symptoms A. Constant   Pain/symptoms exacerbated by A. Sitting;B. Walking;M. Other  (standing)   Pain/symptoms eased by E. Changing positions   Prior Level of Function   Prior Level of Function-Mobility pt was able to ambulate > 1 mile without increased prashant pain or weakness prior to injury   Current Level of Function   Patient role/employment history A. Employed   Fall Risk Screen   Fall screen " completed by PT   Have you fallen 2 or more times in the past year? No   Have you fallen and had an injury in the past year? No   Timed Up and Go score (seconds) 9.5   Is patient a fall risk? No   Ankle/Foot Objective Findings   Side (if bilateral, select both right and left) Left   Ankle/Foot Special Tests Comments special tests deferred due to post operative status   Palpation increased   Left DF (Knee Ext) AROM lacking 26* dorsiflexion L. 8* dorsiflexion   Left PF AROM 48* plantarflexion L. 70* R plantarflexion.   Left DF/Inversion Strength 2/5 L   Left DF/Eversion Strength 2/5 L   Left Gastroc (in WB) Flexibility +tightness   Left Soleus (in WB) Flexibility +tightness   Planned Therapy Interventions   Planned Therapy Interventions IADL retraining;balance training;joint mobilization;manual therapy;motor coordination training;neuromuscular re-education;ROM;strengthening;stretching;bed mobility training   Planned Modality Interventions   Planned Modality Interventions Electrical stimulation   Clinical Impression   Criteria for Skilled Therapeutic Interventions Met yes, treatment indicated   PT Diagnosis Decreased ROM and strength secondary to post op L ankle surgery   Influenced by the following impairments decreased ROM and strength   Functional limitations due to impairments decreased participation with walking, stairs   Clinical Presentation Stable/Uncomplicated   Clinical Presentation Rationale pt presents with stable comorbidities,is motivated to participate   Clinical Decision Making (Complexity) Low complexity   Therapy Frequency 2 times/Week   Predicted Duration of Therapy Intervention (days/wks) 10 weeks   Risk & Benefits of therapy have been explained Yes   Patient, Family & other staff in agreement with plan of care Yes   Education Assessment   Preferred Learning Style Pictures/video   Barriers to Learning No barriers   ORTHO GOALS   PT Ortho Eval Goals 1;2;3   Ortho Goal 1   Goal Identifier ROM   Goal  Description Pt will demonstrate atleast 0* ankle dorsiflexion AROM in order to participate with standing > 1 hour without increased ankle pain.   Target Date 04/27/18   Ortho Goal 2   Goal Identifier Walking   Goal Description pt will demonstrate increased dorsiflexion strength of atleast 4-/5 in order to participate with walking >1 mile.   Target Date 05/11/18   Ortho Goal 3   Goal Identifier Stairs   Goal Description pt will demonstrate increased gastroc flexibility in order to participate with descending the stairs without increased ankle pain.   Target Date 04/27/18   Total Evaluation Time   Total Evaluation Time 20   Therapy Certification   Certification date from 03/02/18   Certification date to 05/11/18   Medical Diagnosis Post op L Ankle Surgery      Banner Transposition Flap Text: The defect edges were debeveled with a #15 scalpel blade.  Given the location of the defect and the proximity to free margins a Banner transposition flap was deemed most appropriate.  Using a sterile surgical marker, an appropriate flap drawn around the defect. The area thus outlined was incised deep to adipose tissue with a #15 scalpel blade.  The skin margins were undermined to an appropriate distance in all directions utilizing iris scissors.

## 2021-08-06 NOTE — NURSING NOTE
GALLO faxed to [facility].  [phone].  Transmission confirmed via Right Fax.     Tranexamic Acid Counseling:  Patient advised of the small risk of bleeding problems with tranexamic acid. They were also instructed to call if they developed any nausea, vomiting or diarrhea. All of the patient's questions and concerns were addressed.

## 2023-02-16 DIAGNOSIS — J45.30 MILD PERSISTENT ASTHMA WITHOUT COMPLICATION: ICD-10-CM

## 2023-02-20 RX ORDER — FLUTICASONE PROPIONATE AND SALMETEROL XINAFOATE 45; 21 UG/1; UG/1
AEROSOL, METERED RESPIRATORY (INHALATION)
Qty: 0.01 G | Refills: 0 | Status: SHIPPED | OUTPATIENT
Start: 2023-02-20

## 2024-05-16 NOTE — PROGRESS NOTES
Genevieve CRNA auscultated lings as pt. Still coughing off & on - crackles heard thru out - chest xray ordered.   Population Health Chart Review & Patient Outreach Details      Additional [facility] Health Notes:               Updates Requested / Reviewed:      Updated Care Coordination Note, Care Everywhere, and Immunizations Reconciliation Completed or Queried: [facility] Topics Overdue:      [facility] [place] Score: 1     Colon Cancer Screening    Shingles/Zoster Vaccine  RSV Vaccine                  Health Maintenance Topic(s) Outreach Outcomes & Actions Taken:    Colorectal Cancer Screening - Outreach Outcomes & Actions Taken  : Colonoscopy Case Request / Referral / Home Test Order Placed

## (undated) DEVICE — DISTRACTOR STRAP ANKLE ARTHRO AR-1712

## (undated) DEVICE — PREP CHLORAPREP 26ML TINTED ORANGE  260815

## (undated) DEVICE — ESU PENCIL W/COATED BLADE E2450H

## (undated) DEVICE — BNDG ESMARK 4" STERILE 836-3412

## (undated) DEVICE — SU ETHILON 3-0 FS-1 18" 669H

## (undated) DEVICE — NDL 22GA 1.5"

## (undated) DEVICE — GLOVE PROTEXIS POWDER FREE 8.0 ORTHOPEDIC 2D73ET80

## (undated) DEVICE — Device

## (undated) DEVICE — BLADE SHAVER ARTHRO 4.2MM FULL RADIUS 9247A

## (undated) DEVICE — SPLINT FIBERGLASS 4X30" PRE-CUT RESIN 76430

## (undated) DEVICE — SOL NACL 0.9% IRRIG 3000ML BAG 07972-08

## (undated) DEVICE — GLOVE PROTEXIS W/NEU-THERA 8.5  2D73TE85

## (undated) DEVICE — GOWN LG DISP 9515

## (undated) DEVICE — SU VICRYL 2-0 SH 27" UND J417H

## (undated) DEVICE — BLADE KNIFE SURG 15 371115

## (undated) DEVICE — SOL NACL 0.9% IRRIG 1000ML BOTTLE 07138-09

## (undated) DEVICE — NDL 18GA 1.5" 305196

## (undated) DEVICE — PACK ARTHROSCOPY KNEE LATEX FREE SOP32CAFCN

## (undated) DEVICE — TUBING PUMP LINVATEC 10K150

## (undated) DEVICE — DECANTER BAG 2002S

## (undated) DEVICE — DECANTER VIAL 2006S

## (undated) DEVICE — GLOVE PROTEXIS POWDER FREE 7.5 ORTHOPEDIC 2D73ET75

## (undated) RX ORDER — CLINDAMYCIN PHOSPHATE 900 MG/50ML
INJECTION, SOLUTION INTRAVENOUS
Status: DISPENSED
Start: 2017-09-28

## (undated) RX ORDER — LIDOCAINE HYDROCHLORIDE 10 MG/ML
INJECTION, SOLUTION EPIDURAL; INFILTRATION; INTRACAUDAL; PERINEURAL
Status: DISPENSED
Start: 2017-09-28

## (undated) RX ORDER — ROPIVACAINE HYDROCHLORIDE 5 MG/ML
INJECTION, SOLUTION EPIDURAL; INFILTRATION; PERINEURAL
Status: DISPENSED
Start: 2017-09-28

## (undated) RX ORDER — ONDANSETRON 2 MG/ML
INJECTION INTRAMUSCULAR; INTRAVENOUS
Status: DISPENSED
Start: 2017-09-28

## (undated) RX ORDER — PROPOFOL 10 MG/ML
INJECTION, EMULSION INTRAVENOUS
Status: DISPENSED
Start: 2017-09-28

## (undated) RX ORDER — FENTANYL CITRATE 50 UG/ML
INJECTION, SOLUTION INTRAMUSCULAR; INTRAVENOUS
Status: DISPENSED
Start: 2017-09-28

## (undated) RX ORDER — LIDOCAINE HCL/EPINEPHRINE/PF 2%-1:200K
VIAL (ML) INJECTION
Status: DISPENSED
Start: 2017-09-28

## (undated) RX ORDER — LIDOCAINE HYDROCHLORIDE 10 MG/ML
INJECTION, SOLUTION EPIDURAL; INFILTRATION; INTRACAUDAL; PERINEURAL
Status: DISPENSED
Start: 2017-11-22

## (undated) RX ORDER — LIDOCAINE HYDROCHLORIDE 20 MG/ML
INJECTION, SOLUTION INFILTRATION; PERINEURAL
Status: DISPENSED
Start: 2017-09-28

## (undated) RX ORDER — DEXAMETHASONE SODIUM PHOSPHATE 4 MG/ML
INJECTION, SOLUTION INTRA-ARTICULAR; INTRALESIONAL; INTRAMUSCULAR; INTRAVENOUS; SOFT TISSUE
Status: DISPENSED
Start: 2017-09-28

## (undated) RX ORDER — GLYCOPYRROLATE 0.2 MG/ML
INJECTION, SOLUTION INTRAMUSCULAR; INTRAVENOUS
Status: DISPENSED
Start: 2017-09-28

## (undated) RX ORDER — BUPIVACAINE HYDROCHLORIDE 5 MG/ML
INJECTION, SOLUTION PERINEURAL
Status: DISPENSED
Start: 2017-09-28